# Patient Record
Sex: FEMALE | URBAN - METROPOLITAN AREA
[De-identification: names, ages, dates, MRNs, and addresses within clinical notes are randomized per-mention and may not be internally consistent; named-entity substitution may affect disease eponyms.]

---

## 2022-04-08 ENCOUNTER — APPOINTMENT (OUTPATIENT)
Dept: URBAN - METROPOLITAN AREA SURGERY 9 | Age: 32
Setting detail: DERMATOLOGY
End: 2022-04-08

## 2022-04-08 DIAGNOSIS — D22 MELANOCYTIC NEVI: ICD-10-CM

## 2022-04-08 PROBLEM — D22.5 MELANOCYTIC NEVI OF TRUNK: Status: ACTIVE | Noted: 2022-04-08

## 2022-04-08 PROCEDURE — 99203 OFFICE O/P NEW LOW 30 MIN: CPT

## 2022-04-08 PROCEDURE — OTHER OTHER: OTHER

## 2022-04-08 PROCEDURE — OTHER COUNSELING: OTHER

## 2022-04-08 PROCEDURE — OTHER SUNSCREEN RECOMMENDATIONS: OTHER

## 2022-04-08 ASSESSMENT — LOCATION SIMPLE DESCRIPTION DERM
LOCATION SIMPLE: LOWER BACK
LOCATION SIMPLE: LEFT UPPER BACK
LOCATION SIMPLE: GROIN

## 2022-04-08 ASSESSMENT — LOCATION DETAILED DESCRIPTION DERM
LOCATION DETAILED: INFERIOR LUMBAR SPINE
LOCATION DETAILED: LEFT INFERIOR UPPER BACK
LOCATION DETAILED: LEFT SUPRAPUBIC SKIN

## 2022-04-08 ASSESSMENT — LOCATION ZONE DERM: LOCATION ZONE: TRUNK

## 2022-04-08 NOTE — PROCEDURE: OTHER
Render Risk Assessment In Note?: no
Note Text (......Xxx Chief Complaint.): This diagnosis correlates with the
Detail Level: Simple
Other (Free Text): Bx shows benign nevus extending to margins - path to be scanned into her chart.\\nExam shows an even brown reticulated macule recurring within the scar.  \\nIt sounds like this was removed more from a location and irritation standpoint.\\nCall if pigmentation grows outside of the scar.
Other (Free Text): Reviewed that this could be removed, but it would leave a scar and would alter her tattoo.

## 2022-04-08 NOTE — HPI: SKIN LESION
Is This A New Presentation, Or A Follow-Up?: Skin Lesion
Additional History: Biopsy done at PCP, results: compound melanocytic [place], extending to the biopsy margins. PCP wanted her to follow up here for this.
Is This A New Presentation, Or A Follow-Up?: Skin Lesions

## 2023-12-11 ENCOUNTER — TELEPHONE (OUTPATIENT)
Dept: PRIMARY CARE | Facility: CLINIC | Age: 33
End: 2023-12-11

## 2023-12-11 NOTE — TELEPHONE ENCOUNTER
PATIENT CALLED IS 4 WKS PREG.   WANTS TO KNOW IF SHE SHOULD STOP O,OMEPRAZOLE ?  OR CHANGE  MEDICINE ?

## 2024-01-31 NOTE — PROGRESS NOTES
"Subjective   Patient ID: Shaila Hurtado is a 34 y.o. female who presents for Annual Exam (Wellness ).    Shaila comes to the office for medication refill.  GERD - PPI daily;  Discussed decreasing fat intake, weight loss,  avoiding recumbency for 3 hours postprandially, elevation of HOB and avoidance of chocolate, tomato based foods, alcohol, peppermint, caffeinated products, citrus fruits/drinks and onions. Tried stopping PPI once discovered she was pregnant but GERD sx returned. Continue PPI therapy (she indicates she ck'd w/ her OB as well & they are in agreement to continue)  Depression - managed by Rosa Hendricks, stable on zoloft  Cervical CA Screening: PAP 1Y ago. UTD thru her OB GYN in [place]. 12 W pregnant  Labs 1Y ago- scheduled for labs thru ob/gyn  No surgeries or hospitalizations within the last year  Fell on ice and then two weeks later tripped in basement              Review of Systems   Constitutional:  Positive for fatigue. Negative for chills and fever.   Gastrointestinal:  Negative for abdominal pain.        + GERD       Objective   /76   Pulse 96   Ht 1.651 m (5' 5\")   Wt 100 kg (221 lb)   SpO2 97%   BMI 36.78 kg/m²     Physical Exam  Vitals and nursing note reviewed.   Constitutional:       Appearance: Normal appearance.   HENT:      Head: Normocephalic.   Cardiovascular:      Rate and Rhythm: Normal rate and regular rhythm.      Heart sounds: Normal heart sounds.   Pulmonary:      Effort: Pulmonary effort is normal.      Breath sounds: Normal breath sounds.   Skin:     General: Skin is warm and dry.   Neurological:      General: No focal deficit present.      Mental Status: She is alert and oriented to person, place, and time.   Psychiatric:         Mood and Affect: Mood normal.         Thought Content: Thought content normal.         Assessment/Plan   Problem List Items Addressed This Visit             ICD-10-CM    Depressive disorder - Primary F32.A    Gastroesophageal reflux disease K21.9 "    Relevant Medications    omeprazole (PriLOSEC) 40 mg DR capsule    Other Relevant Orders    Follow Up In Primary Care - Established     1. Depressive disorder      stable on zoloft; follows w/ [facility]      2. Gastroesophageal reflux disease, unspecified whether esophagitis present  omeprazole (PriLOSEC) 40 mg DR capsule    Follow Up In Primary Care - Established    cont. prilosec             Patient was identified as a fall risk. Risk prevention instructions provided.

## 2024-02-01 ENCOUNTER — OFFICE VISIT (OUTPATIENT)
Dept: PRIMARY CARE | Facility: CLINIC | Age: 34
End: 2024-02-01
Payer: COMMERCIAL

## 2024-02-01 VITALS
BODY MASS INDEX: 36.82 KG/M2 | DIASTOLIC BLOOD PRESSURE: 76 MMHG | OXYGEN SATURATION: 97 % | HEIGHT: 65 IN | SYSTOLIC BLOOD PRESSURE: 124 MMHG | HEART RATE: 96 BPM | WEIGHT: 221 LBS

## 2024-02-01 DIAGNOSIS — K21.9 GASTROESOPHAGEAL REFLUX DISEASE, UNSPECIFIED WHETHER ESOPHAGITIS PRESENT: ICD-10-CM

## 2024-02-01 DIAGNOSIS — F32.A DEPRESSIVE DISORDER: Primary | ICD-10-CM

## 2024-02-01 PROBLEM — L23.7 PLANT ALLERGIC CONTACT DERMATITIS: Status: ACTIVE | Noted: 2024-02-01

## 2024-02-01 PROCEDURE — 1036F TOBACCO NON-USER: CPT | Performed by: NURSE PRACTITIONER

## 2024-02-01 PROCEDURE — 99395 PREV VISIT EST AGE 18-39: CPT | Performed by: NURSE PRACTITIONER

## 2024-02-01 RX ORDER — OMEPRAZOLE 40 MG/1
40 CAPSULE, DELAYED RELEASE ORAL
COMMUNITY
End: 2024-02-01 | Stop reason: SDUPTHER

## 2024-02-01 RX ORDER — SERTRALINE HYDROCHLORIDE 50 MG/1
50 TABLET, FILM COATED ORAL DAILY
COMMUNITY
Start: 2023-12-11

## 2024-02-01 RX ORDER — OMEPRAZOLE 40 MG/1
40 CAPSULE, DELAYED RELEASE ORAL
Qty: 90 CAPSULE | Refills: 3 | Status: SHIPPED | OUTPATIENT
Start: 2024-02-01 | End: 2025-01-31

## 2024-02-01 ASSESSMENT — PATIENT HEALTH QUESTIONNAIRE - PHQ9
1. LITTLE INTEREST OR PLEASURE IN DOING THINGS: NOT AT ALL
2. FEELING DOWN, DEPRESSED OR HOPELESS: SEVERAL DAYS
SUM OF ALL RESPONSES TO PHQ9 QUESTIONS 1 AND 2: 1
10. IF YOU CHECKED OFF ANY PROBLEMS, HOW DIFFICULT HAVE THESE PROBLEMS MADE IT FOR YOU TO DO YOUR WORK, TAKE CARE OF THINGS AT HOME, OR GET ALONG WITH OTHER PEOPLE: NOT DIFFICULT AT ALL

## 2024-02-01 ASSESSMENT — ENCOUNTER SYMPTOMS
ABDOMINAL PAIN: 0
FATIGUE: 1
CHILLS: 0
FEVER: 0
ROS GI COMMENTS: + GERD

## 2024-02-01 NOTE — PATIENT INSTRUCTIONS
Refill [facility]  Office visit 1 year      Ways to Help Prevent Falls at Home    Quick Tips   ? Ask for help if you need it. Most people want to help!   ? Get up slowly after sitting or laying down   ? Wear a medical alert device or keep cell phone in your pocket   ? Use night lights, especially areas near a bathroom   ? Keep the items you use often within reach on a small stool or end table   ? Use an assistive device such as walker or cane, as directed by provider/physical therapy   ? Use a non-slip mat and grab bars in your bathroom. Look for home health sections for best options     Other Areas to Focus On   ? Exercise and nutrition: Regular exercise or taking a falls prevention class are great ways improve strength and balance. Don’t forget to stay hydrated and bring a snack!   ? Medicine side effects: Some medicines can make you sleepy or dizzy, which could cause a fall. Ask your healthcare provider about the side effects your medicines could cause. Be sure to let them know if you take any vitamins or supplements as well.   ? Tripping hazards: Remove items you could trip on, such as loose mats, rugs, cords, and clutter. Wear closed toe shoes with rubber soles.   ? Health and wellness: Get regular checkups with your healthcare provider, plus routine vision and hearing screenings. Talk with your healthcare provider about:   o Your medicines and the possible side effects - bring them in a bag if that is easier!   o Problems with balance or feeling dizzy   o Ways to promote bone health, such as Vitamin D and calcium supplements   o Questions or concerns about falling     *Ask your healthcare team if you have questions     [facility], 2022

## 2024-07-29 ENCOUNTER — HOSPITAL ENCOUNTER (INPATIENT)
Age: 34
LOS: 3 days | Discharge: HOME | End: 2024-08-01
Payer: COMMERCIAL

## 2024-07-29 VITALS — TEMPERATURE: 97.7 F | RESPIRATION RATE: 16 BRPM | OXYGEN SATURATION: 98 % | HEART RATE: 79 BPM

## 2024-07-29 VITALS — SYSTOLIC BLOOD PRESSURE: 151 MMHG | DIASTOLIC BLOOD PRESSURE: 80 MMHG | RESPIRATION RATE: 16 BRPM | HEART RATE: 78 BPM

## 2024-07-29 VITALS — DIASTOLIC BLOOD PRESSURE: 63 MMHG | RESPIRATION RATE: 16 BRPM | HEART RATE: 78 BPM | SYSTOLIC BLOOD PRESSURE: 131 MMHG

## 2024-07-29 VITALS — HEART RATE: 85 BPM | SYSTOLIC BLOOD PRESSURE: 162 MMHG | DIASTOLIC BLOOD PRESSURE: 91 MMHG

## 2024-07-29 VITALS — RESPIRATION RATE: 16 BRPM | DIASTOLIC BLOOD PRESSURE: 77 MMHG | SYSTOLIC BLOOD PRESSURE: 128 MMHG | HEART RATE: 83 BPM

## 2024-07-29 VITALS
HEART RATE: 82 BPM | RESPIRATION RATE: 16 BRPM | TEMPERATURE: 98.42 F | SYSTOLIC BLOOD PRESSURE: 127 MMHG | DIASTOLIC BLOOD PRESSURE: 73 MMHG

## 2024-07-29 VITALS — SYSTOLIC BLOOD PRESSURE: 123 MMHG | HEART RATE: 71 BPM | DIASTOLIC BLOOD PRESSURE: 85 MMHG

## 2024-07-29 VITALS — HEART RATE: 80 BPM | OXYGEN SATURATION: 97 %

## 2024-07-29 VITALS — BODY MASS INDEX: 40.4 KG/M2

## 2024-07-29 DIAGNOSIS — F32.A: ICD-10-CM

## 2024-07-29 DIAGNOSIS — O40.3XX0: ICD-10-CM

## 2024-07-29 DIAGNOSIS — O26.03: ICD-10-CM

## 2024-07-29 DIAGNOSIS — Z3A.37: ICD-10-CM

## 2024-07-29 DIAGNOSIS — Z79.899: ICD-10-CM

## 2024-07-29 LAB
DEPRECATED RDW RBC: 53.2 FL (ref 35.1–43.9)
ERYTHROCYTE [DISTWIDTH] IN BLOOD: 15.8 % (ref 11.6–14.6)
HCT VFR BLD AUTO: 31.6 % (ref 37–47)
HEMOGLOBIN: 10.6 G/DL (ref 12–15)
HGB BLD-MCNC: 10.6 G/DL (ref 12–15)
IMMATURE GRANULOCYTES COUNT: 0.09 X10^3/UL (ref 0–0)
MCV RBC: 92.4 FL (ref 81–99)
MEAN CORP HGB CONC: 33.5 G/DL (ref 32–36)
MEAN PLATELET VOL.: 10.6 FL (ref 6.2–12)
NRBC FLAGGED BY ANALYZER: 0 % (ref 0–5)
PLATELET # BLD: 278 K/MM3 (ref 150–450)
PLATELET COUNT: 278 K/MM3 (ref 150–450)
RBC # BLD AUTO: 3.42 M/MM3 (ref 4.2–5.4)
RBC DISTRIBUTION WIDTH CV: 15.8 % (ref 11.6–14.6)
RBC DISTRIBUTION WIDTH SD: 53.2 FL (ref 35.1–43.9)
WBC # BLD AUTO: 11.7 K/MM3 (ref 4.4–11)
WHITE BLOOD COUNT: 11.7 K/MM3 (ref 4.4–11)

## 2024-07-29 PROCEDURE — 86850 RBC ANTIBODY SCREEN: CPT

## 2024-07-29 PROCEDURE — 82962 GLUCOSE BLOOD TEST: CPT

## 2024-07-29 PROCEDURE — 99221 1ST HOSP IP/OBS SF/LOW 40: CPT

## 2024-07-29 PROCEDURE — 86780 TREPONEMA PALLIDUM: CPT

## 2024-07-29 PROCEDURE — 59025 FETAL NON-STRESS TEST: CPT

## 2024-07-29 PROCEDURE — G0378 HOSPITAL OBSERVATION PER HR: HCPCS

## 2024-07-29 PROCEDURE — 86900 BLOOD TYPING SEROLOGIC ABO: CPT

## 2024-07-29 PROCEDURE — 85025 COMPLETE CBC W/AUTO DIFF WBC: CPT

## 2024-07-29 PROCEDURE — 86901 BLOOD TYPING SEROLOGIC RH(D): CPT

## 2024-07-29 PROCEDURE — 59050 FETAL MONITOR W/REPORT: CPT

## 2024-07-29 NOTE — HP.PCM.OB_ITS
HPI - General    
General    
Date of Admission: 07/29/24    
Date of Service: 07/29/24    
Chief Complaint: induction of labor    
HPI Narrative    
rickie GELLER a 34 YOF G 1 P0 W 37 2/7 weeks gestation w/ EDC 8/17/24 presents   
for induction of labor at [facility] recommendation due to GDMA2 w/ polyhydramnios.    
    
Pregnancy complicated to date by maternal obesity, GDMA2, anemia, depression,   
h/o abnormal paps    
NKDA    
medications- prilosec, NPH insulin, abilify and zooft    
Social history- she denies any tob/drug/etoh use during the pregnancy    
surgery history - tonsillectomy    
    
    
Maternal Data    
Information    
Final LAURYN: 08/17/24    
Final LAURYN Source: US <20 weeks    
Gestational age: 37 2/7 weeks    
    
[facility]    
                                Home Medications    
    
    
    
?Medication ?Instructions ?Recorded ?Last Taken ?Type    
     
fexofenadine-pseudoephedrine ER 1 tab.sr PO DAILY 11/05/13 Unknown History    
    
180 mg-240 mg tablet,ext.release        
    
24 hr (Allegra-D 24 Hour)        
     
norethindrone 1 mg-e. estradiol 20 1 ea PO DAILY 01/18/16 Unknown History    
    
mcg (24)-iron 75 mg (4) chew        
    
tablet (Minastrin 24 Fe)        
    
    
    
                                            
    
    
    
Allergy/AdvReac Type Severity Reaction Status Date / Time    
     
No Known Allergies Allergy   Verified 11/05/13 08:50    
    
    
    
Social History    
Smoking Status:  Never smoker     
    
    
    
ROS    
Constitutional    
Constitutional: Denies fatigue, fever(s) or malaise    
Eyes    
Eyes: Denies change in vision    
ENT    
HEENT: Denies dizziness or headache(s)    
Cardiovascular    
Cardiovascular: Denies chest pain, dyspnea or lightheadedness    
Respiratory/Chest    
Respiratory/Chest: Denies cough or dyspnea    
Gastrointestinal    
Gastrointestinal: Denies change in bowel habits    
Genitourinary    
Genitourinary: Denies burning urination or genital lesions    
Integumentary    
Integumentary: Denies rash    
Neurologic    
Neurologic: Denies confusion, dizziness, headache(s), numbness or weakness    
    
Vital Signs    
Vital Signs    
Vital Signs:     
                                            
    
    
    
 07/29/24    
19:39 07/29/24    
19:39 07/29/24    
19:40    
     
Temperature       
     
Temperature Source       
     
Pulse Rate  71 79    
     
Respiratory Rate       
     
Blood Pressure 123/85 H      
     
BP Systolic 123      
     
BP Diastolic 85      
     
Pulse Ox       
    
    
    
    
 07/29/24    
19:40 07/29/24    
19:40 07/29/24    
19:40    
     
Temperature       
     
Temperature Source  Temporal     
     
Pulse Rate       
     
Respiratory Rate   16    
     
Blood Pressure       
     
BP Systolic       
     
BP Diastolic       
     
Pulse Ox 98      
    
    
    
    
 07/29/24    
19:40    
     
Temperature 97.7 F L    
     
Temperature Source     
     
Pulse Rate     
     
Respiratory Rate     
     
Blood Pressure     
     
BP Systolic     
     
BP Diastolic     
     
Pulse Ox     
    
    
                                     Weight    
    
    
    
Weight:                        113.8 kg                                           
    
     
Body Mass Index (BMI)          40.4                                               
    
    
    
    
    
    
Physical Exam    
Const    
alert and no apparent distress    
General Appearance: cooperative    
HEENT    
normocephalic    
Resp    
normal respiratory effort    
Cardio    
regular rate    
GI    
soft to palpation    
GI Narrative:     
gravid, nontender, appropriate for gestational age    
Extremity    
no calf tenderness    
General Extremity: edema    
Skin    
no wounds    
Rashes: No rashes noted    
Psych    
activity/motor behavior normal    
    
Prenatal Labs    
Prenatal Labs    
Prenatal Labs:     
    
    
                          Antibody Screen           Pending     
     
                          Hct                       31.6 % (37-47)  L    
     
                          Hgb                       10.6 g/dL (12.0-15.0)  L    
     
                          Syphilis Total Ab         Pending     
     
                          VZV IgG Antibody          < 0.91 index (.-) L    
    
    
    
Assessment & Plan    
(1) 37 weeks gestation of pregnancy:     
PLAN: Risk benefits and alternatives to induction of labor, discussed with   
patient, her questions were answered to her satisfaction she desires to proceed.  
 Will proceed with Woods and nasal Prestel induction of labor followed by   
Pitocin artificial rupture membranes if needed.  Gestational diabetes class A2   
will give 8 units of insulin tonight and monitor blood sugars appropriately.    
Estimated fetal weight is approximately 4000 g and pelvis clinically adequate to  
expect vaginal delivery.    
(2) High-risk pregnancy in third trimester:     
(3) Gestational diabetes mellitus, class A2:     
(4) Maternal obesity syndrome in third trimester:

## 2024-07-29 NOTE — PCM.HP.OB
HPI - General
General
Date of Admission: 07/29/24
Date of Service: 07/29/24
Chief Complaint: induction of labor
HPI Narrative
rickie GELLER a 34 YOF G 1 P0 W 37 2/7 weeks gestation w/ EDC 8/17/24 presents for induction of labor at [facility] recommendation due to GDMA2 w/ polyhydramnios.

Pregnancy complicated to date by maternal obesity, GDMA2, anemia, depression, h/o abnormal paps
NKDA
medications- prilosec, NPH insulin, abilify and zooft
Social history- she denies any tob/drug/etoh use during the pregnancy
surgery history - tonsillectomy


Maternal Data
Information
Final LAURYN: 08/17/24
Final LAURYN Source: US <20 weeks
Gestational age: 37 2/7 weeks

[facility]
Home Medications

?Medication ?Instructions ?Recorded ?Last Taken ?Type
fexofenadine-pseudoephedrine ER 1 tab.sr PO DAILY 11/05/13 Unknown History
180 mg-240 mg tablet,ext.release    
24 hr (Allegra-D 24 Hour)    
norethindrone 1 mg-e. estradiol 20 1 ea PO DAILY 01/18/16 Unknown History
mcg (24)-iron 75 mg (4) chew    
tablet (Minastrin 24 Fe)    




Allergy/AdvReac Type Severity Reaction Status Date / Time
No Known Allergies Allergy   Verified 11/05/13 08:50


Social History
Smoking Status:  Never smoker 



ROS
Constitutional
Constitutional: Denies fatigue, fever(s) or malaise
Eyes
Eyes: Denies change in vision
ENT
HEENT: Denies dizziness or headache(s)
Cardiovascular
Cardiovascular: Denies chest pain, dyspnea or lightheadedness
Respiratory/Chest
Respiratory/Chest: Denies cough or dyspnea
Gastrointestinal
Gastrointestinal: Denies change in bowel habits
Genitourinary
Genitourinary: Denies burning urination or genital lesions
Integumentary
Integumentary: Denies rash
Neurologic
Neurologic: Denies confusion, dizziness, headache(s), numbness or weakness

Vital Signs
Vital Signs
Vital Signs: 


 07/29/24
19:39 07/29/24
19:39 07/29/24
19:40
Temperature   
Temperature Source   
Pulse Rate  71 79
Respiratory Rate   
Blood Pressure 123/85 H  
BP Systolic 123  
BP Diastolic 85  
Pulse Ox   

 07/29/24
19:40 07/29/24
19:40 07/29/24
19:40
Temperature   
Temperature Source  Temporal 
Pulse Rate   
Respiratory Rate   16
Blood Pressure   
BP Systolic   
BP Diastolic   
Pulse Ox 98  

 07/29/24
19:40
Temperature 97.7 F L
Temperature Source 
Pulse Rate 
Respiratory Rate 
Blood Pressure 
BP Systolic 
BP Diastolic 
Pulse Ox 

Weight

Weight:                        113.8 kg                                          
Body Mass Index (BMI)          40.4                                              




Physical Exam
Const
alert and no apparent distress
General Appearance: cooperative
HEENT
normocephalic
Resp
normal respiratory effort
Cardio
regular rate
GI
soft to palpation
GI Narrative: 
gravid, nontender, appropriate for gestational age
Extremity
no calf tenderness
General Extremity: edema
Skin
no wounds
Rashes: No rashes noted
Psych
activity/motor behavior normal

Prenatal Labs
Prenatal Labs
Prenatal Labs: 
      Antibody Screen Pending 
      Hct 31.6 % (37-47)  L
      Hgb 10.6 g/dL (12.0-15.0)  L
      Syphilis Total Ab Pending 
      VZV IgG Antibody < 0.91 index (.-) L


Assessment & Plan
(1) 37 weeks gestation of pregnancy: 
PLAN: Risk benefits and alternatives to induction of labor, discussed with patient, her questions were answered to her satisfaction she desires to proceed.  Will proceed with Woods and nasal Prestel induction of labor followed by Pitocin artificial 
rupture membranes if needed.  Gestational diabetes class A2 will give 8 units of insulin tonight and monitor blood sugars appropriately.  Estimated fetal weight is approximately 4000 g and pelvis clinically adequate to expect vaginal delivery.
(2) High-risk pregnancy in third trimester: 
(3) Gestational diabetes mellitus, class A2: 
(4) Maternal obesity syndrome in third trimester:

## 2024-07-30 VITALS — DIASTOLIC BLOOD PRESSURE: 74 MMHG | SYSTOLIC BLOOD PRESSURE: 135 MMHG | HEART RATE: 88 BPM

## 2024-07-30 VITALS — HEART RATE: 82 BPM | OXYGEN SATURATION: 94 %

## 2024-07-30 VITALS — RESPIRATION RATE: 16 BRPM | SYSTOLIC BLOOD PRESSURE: 125 MMHG | HEART RATE: 64 BPM | DIASTOLIC BLOOD PRESSURE: 74 MMHG

## 2024-07-30 VITALS — DIASTOLIC BLOOD PRESSURE: 71 MMHG | SYSTOLIC BLOOD PRESSURE: 130 MMHG | HEART RATE: 70 BPM

## 2024-07-30 VITALS — DIASTOLIC BLOOD PRESSURE: 75 MMHG | RESPIRATION RATE: 16 BRPM | HEART RATE: 72 BPM | SYSTOLIC BLOOD PRESSURE: 131 MMHG

## 2024-07-30 VITALS
DIASTOLIC BLOOD PRESSURE: 84 MMHG | HEART RATE: 75 BPM | SYSTOLIC BLOOD PRESSURE: 140 MMHG | RESPIRATION RATE: 16 BRPM | TEMPERATURE: 97.52 F | OXYGEN SATURATION: 96 %

## 2024-07-30 VITALS — HEART RATE: 95 BPM | DIASTOLIC BLOOD PRESSURE: 61 MMHG | SYSTOLIC BLOOD PRESSURE: 118 MMHG

## 2024-07-30 VITALS — SYSTOLIC BLOOD PRESSURE: 135 MMHG | DIASTOLIC BLOOD PRESSURE: 75 MMHG | HEART RATE: 79 BPM

## 2024-07-30 VITALS — HEART RATE: 91 BPM | OXYGEN SATURATION: 96 %

## 2024-07-30 VITALS
SYSTOLIC BLOOD PRESSURE: 136 MMHG | HEART RATE: 88 BPM | RESPIRATION RATE: 16 BRPM | TEMPERATURE: 97.16 F | DIASTOLIC BLOOD PRESSURE: 84 MMHG

## 2024-07-30 VITALS — HEART RATE: 82 BPM | OXYGEN SATURATION: 97 %

## 2024-07-30 VITALS — DIASTOLIC BLOOD PRESSURE: 75 MMHG | SYSTOLIC BLOOD PRESSURE: 118 MMHG | HEART RATE: 72 BPM

## 2024-07-30 VITALS — OXYGEN SATURATION: 94 % | HEART RATE: 96 BPM | SYSTOLIC BLOOD PRESSURE: 108 MMHG | DIASTOLIC BLOOD PRESSURE: 56 MMHG

## 2024-07-30 VITALS — HEART RATE: 83 BPM | DIASTOLIC BLOOD PRESSURE: 54 MMHG | SYSTOLIC BLOOD PRESSURE: 112 MMHG

## 2024-07-30 VITALS — OXYGEN SATURATION: 96 % | HEART RATE: 93 BPM

## 2024-07-30 VITALS — OXYGEN SATURATION: 96 % | HEART RATE: 98 BPM

## 2024-07-30 VITALS — RESPIRATION RATE: 16 BRPM | TEMPERATURE: 97.88 F

## 2024-07-30 VITALS — RESPIRATION RATE: 16 BRPM

## 2024-07-30 VITALS — OXYGEN SATURATION: 96 % | HEART RATE: 89 BPM

## 2024-07-30 VITALS — OXYGEN SATURATION: 94 % | HEART RATE: 77 BPM

## 2024-07-30 VITALS — OXYGEN SATURATION: 94 % | HEART RATE: 87 BPM

## 2024-07-30 VITALS — RESPIRATION RATE: 16 BRPM | TEMPERATURE: 98.2 F

## 2024-07-30 VITALS
HEART RATE: 75 BPM | DIASTOLIC BLOOD PRESSURE: 62 MMHG | SYSTOLIC BLOOD PRESSURE: 121 MMHG | TEMPERATURE: 98.42 F | RESPIRATION RATE: 16 BRPM

## 2024-07-30 VITALS — RESPIRATION RATE: 16 BRPM | HEART RATE: 75 BPM | DIASTOLIC BLOOD PRESSURE: 76 MMHG | SYSTOLIC BLOOD PRESSURE: 134 MMHG

## 2024-07-30 VITALS — HEART RATE: 108 BPM | OXYGEN SATURATION: 97 %

## 2024-07-30 VITALS
TEMPERATURE: 98 F | DIASTOLIC BLOOD PRESSURE: 82 MMHG | RESPIRATION RATE: 16 BRPM | SYSTOLIC BLOOD PRESSURE: 138 MMHG | HEART RATE: 79 BPM

## 2024-07-30 VITALS — OXYGEN SATURATION: 97 % | HEART RATE: 113 BPM

## 2024-07-30 VITALS — HEART RATE: 116 BPM | OXYGEN SATURATION: 97 %

## 2024-07-30 VITALS — DIASTOLIC BLOOD PRESSURE: 58 MMHG | HEART RATE: 87 BPM | SYSTOLIC BLOOD PRESSURE: 117 MMHG

## 2024-07-30 VITALS — HEART RATE: 97 BPM | DIASTOLIC BLOOD PRESSURE: 59 MMHG | SYSTOLIC BLOOD PRESSURE: 118 MMHG

## 2024-07-30 VITALS — TEMPERATURE: 97.4 F | RESPIRATION RATE: 16 BRPM

## 2024-07-30 VITALS — DIASTOLIC BLOOD PRESSURE: 50 MMHG | SYSTOLIC BLOOD PRESSURE: 106 MMHG | HEART RATE: 87 BPM

## 2024-07-30 VITALS — OXYGEN SATURATION: 96 % | HEART RATE: 88 BPM

## 2024-07-30 VITALS
HEART RATE: 89 BPM | TEMPERATURE: 97.4 F | SYSTOLIC BLOOD PRESSURE: 108 MMHG | DIASTOLIC BLOOD PRESSURE: 59 MMHG | OXYGEN SATURATION: 96 % | RESPIRATION RATE: 16 BRPM

## 2024-07-30 VITALS — HEART RATE: 110 BPM | DIASTOLIC BLOOD PRESSURE: 69 MMHG | SYSTOLIC BLOOD PRESSURE: 119 MMHG

## 2024-07-30 VITALS
HEART RATE: 89 BPM | SYSTOLIC BLOOD PRESSURE: 145 MMHG | RESPIRATION RATE: 16 BRPM | OXYGEN SATURATION: 99 % | DIASTOLIC BLOOD PRESSURE: 88 MMHG

## 2024-07-30 VITALS — SYSTOLIC BLOOD PRESSURE: 126 MMHG | HEART RATE: 80 BPM | DIASTOLIC BLOOD PRESSURE: 76 MMHG

## 2024-07-30 VITALS — OXYGEN SATURATION: 96 % | HEART RATE: 109 BPM

## 2024-07-30 VITALS — DIASTOLIC BLOOD PRESSURE: 77 MMHG | RESPIRATION RATE: 16 BRPM | HEART RATE: 81 BPM | SYSTOLIC BLOOD PRESSURE: 139 MMHG

## 2024-07-30 VITALS — HEART RATE: 84 BPM | OXYGEN SATURATION: 91 %

## 2024-07-30 VITALS — RESPIRATION RATE: 16 BRPM | HEART RATE: 122 BPM | OXYGEN SATURATION: 89 %

## 2024-07-30 VITALS — SYSTOLIC BLOOD PRESSURE: 147 MMHG | HEART RATE: 93 BPM | DIASTOLIC BLOOD PRESSURE: 93 MMHG

## 2024-07-30 VITALS — HEART RATE: 88 BPM | DIASTOLIC BLOOD PRESSURE: 63 MMHG | SYSTOLIC BLOOD PRESSURE: 126 MMHG

## 2024-07-30 VITALS — SYSTOLIC BLOOD PRESSURE: 119 MMHG | HEART RATE: 100 BPM | DIASTOLIC BLOOD PRESSURE: 67 MMHG

## 2024-07-30 VITALS — OXYGEN SATURATION: 97 % | HEART RATE: 86 BPM

## 2024-07-30 VITALS — OXYGEN SATURATION: 96 % | HEART RATE: 90 BPM

## 2024-07-30 VITALS — HEART RATE: 111 BPM | OXYGEN SATURATION: 97 %

## 2024-07-30 VITALS — OXYGEN SATURATION: 97 % | HEART RATE: 87 BPM

## 2024-07-30 VITALS
SYSTOLIC BLOOD PRESSURE: 118 MMHG | TEMPERATURE: 97.6 F | RESPIRATION RATE: 16 BRPM | HEART RATE: 82 BPM | DIASTOLIC BLOOD PRESSURE: 66 MMHG

## 2024-07-30 VITALS — DIASTOLIC BLOOD PRESSURE: 59 MMHG | HEART RATE: 76 BPM | SYSTOLIC BLOOD PRESSURE: 114 MMHG

## 2024-07-30 VITALS — HEART RATE: 77 BPM | DIASTOLIC BLOOD PRESSURE: 71 MMHG | SYSTOLIC BLOOD PRESSURE: 129 MMHG

## 2024-07-30 VITALS — HEART RATE: 102 BPM | OXYGEN SATURATION: 96 %

## 2024-07-30 VITALS — HEART RATE: 89 BPM | OXYGEN SATURATION: 97 %

## 2024-07-30 VITALS — OXYGEN SATURATION: 97 % | HEART RATE: 89 BPM

## 2024-07-30 VITALS — HEART RATE: 90 BPM | OXYGEN SATURATION: 97 %

## 2024-07-30 VITALS — HEART RATE: 101 BPM | OXYGEN SATURATION: 97 %

## 2024-07-30 VITALS — OXYGEN SATURATION: 96 % | HEART RATE: 85 BPM

## 2024-07-30 VITALS — HEART RATE: 99 BPM | OXYGEN SATURATION: 97 %

## 2024-07-30 VITALS — DIASTOLIC BLOOD PRESSURE: 92 MMHG | SYSTOLIC BLOOD PRESSURE: 138 MMHG | HEART RATE: 82 BPM | OXYGEN SATURATION: 98 %

## 2024-07-30 VITALS — OXYGEN SATURATION: 93 % | HEART RATE: 88 BPM

## 2024-07-30 VITALS — RESPIRATION RATE: 16 BRPM | TEMPERATURE: 98 F

## 2024-07-30 VITALS — OXYGEN SATURATION: 95 % | HEART RATE: 88 BPM

## 2024-07-30 VITALS — HEART RATE: 81 BPM | OXYGEN SATURATION: 98 %

## 2024-07-30 VITALS — HEART RATE: 93 BPM | OXYGEN SATURATION: 96 %

## 2024-07-30 VITALS — HEART RATE: 86 BPM | SYSTOLIC BLOOD PRESSURE: 147 MMHG | DIASTOLIC BLOOD PRESSURE: 88 MMHG

## 2024-07-30 VITALS — OXYGEN SATURATION: 99 % | HEART RATE: 67 BPM

## 2024-07-30 VITALS — OXYGEN SATURATION: 97 % | HEART RATE: 91 BPM

## 2024-07-30 VITALS — RESPIRATION RATE: 16 BRPM | DIASTOLIC BLOOD PRESSURE: 76 MMHG | SYSTOLIC BLOOD PRESSURE: 139 MMHG | HEART RATE: 72 BPM

## 2024-07-30 VITALS — HEART RATE: 97 BPM | OXYGEN SATURATION: 97 %

## 2024-07-30 VITALS — SYSTOLIC BLOOD PRESSURE: 123 MMHG | DIASTOLIC BLOOD PRESSURE: 73 MMHG | HEART RATE: 74 BPM

## 2024-07-30 VITALS — OXYGEN SATURATION: 99 % | HEART RATE: 76 BPM

## 2024-07-30 VITALS — HEART RATE: 81 BPM | DIASTOLIC BLOOD PRESSURE: 79 MMHG | SYSTOLIC BLOOD PRESSURE: 126 MMHG

## 2024-07-30 VITALS — OXYGEN SATURATION: 85 % | HEART RATE: 72 BPM

## 2024-07-30 VITALS — RESPIRATION RATE: 16 BRPM | TEMPERATURE: 98.06 F

## 2024-07-30 VITALS — HEART RATE: 99 BPM | DIASTOLIC BLOOD PRESSURE: 70 MMHG | SYSTOLIC BLOOD PRESSURE: 121 MMHG

## 2024-07-30 VITALS — HEART RATE: 91 BPM | OXYGEN SATURATION: 93 %

## 2024-07-30 NOTE — OP.PCM_ITS
Vaginal Delivery    
Maternal Presentation    
Maternal Presentation: Medically Indicated Induction    
Type of Induction: Pitocin, Woods Bulb and Amniotomy    
Operative Information    
Date of Procedure: 24    
Pre-Operative Diagnosis: GDMA2, polyhydramnios, Obesity in pregnancy , meconium    
Post-Operative Diagnosis: Same, live female infant    
Surgery / Procedure Performed: Spontaneous Vaginal Delivery    
Type of Anesthesia: Epidural    
Drain: Woods to straight drain    
Estimated Blood Loss: 150    
Time of Delivery: 19:23    
Findings    
Description of Procedure:     
Patient progressed to fully dilated.  Good maternal pushing efforts delivered   
the infant's head.  Loose nuchal cord was appreciated this was reduced prior to   
delivery.  Anterior shoulder delivered without complication followed by the   
posterior shoulder and the rest the infant's body.  The infant was placed on the  
mother's chest and the cord was clamped and cut immediately.  The infant was   
handed to the waiting nursery team and brought to the [facility] for resuscitation  
measures and further evaluation.  At this time IV Pitocin was started.  Cord   
gases and cord blood were obtained.  First-degree vaginal laceration was   
appreciated with some brisk bleeding.  3-0 Rapide suture in a figure-of-eight   
fashion were placed in multiple areas.  Laceration was minimal however the   
bleeding was brisk for how small the laceration was.  Pressure was held for 2   
minutes.  Good hemostasis was then appreciated.    
Fetal Presentation: Vertex    
Amniotic Membrane Rupture Type: Artificial    
Amniotic Fluid Description: Moderate meconium    
Placental Delivery Description: Expressed    
Placenta Disposition: [facility]    
Specimen(s) Removed: Placenta    
Fetal Cord Vessel Description: 3 Vessels    
Cord Entanglement: Around neck x 1, loose    
Nuchal Cord Compression: Without compression    
Cord Gases: ABG and VBG    
Infant A Gender: Female    
Apgar (1 minute): 3    
Apgar (5 minute): 9    
Delayed Cord Clamping: No    
Post Vaginal Delivery    
Medications Given After Delivery: IV Pitocin    
Episiotomy Description: None    
Laceration: Vaginal Extension/lac and 1st degree    
Complication    
Complications: None

## 2024-07-30 NOTE — PN_ITS
Progress Note    
pt seen at bedside, AROM performed- Meconium noted. Continue Pitocin. FHR   
reviewed, cat 1.

## 2024-07-30 NOTE — XMS RPT_ITS
Comprehensive CCD (C-CDA v2.1)  
  
                            Created on: 2024  
  
  
May, Ms. Collins  
External Reference #: CDR,PersonID:[id number]  
: 1990  
Sex: Female  
  
Demographics  
  
  
                                        Address             [address]  
[place]  [address]  
   
                                        Home Phone          [phone]  
   
                                        Mobile Phone        [phone]  
   
                                        Preferred Language  en  
   
                                        Marital Status      Single  
   
                                        Jew Affiliation Unknown  
   
                                        Race                White  
   
                                        Ethnic Group        Not  or Lati  
no  
  
  
Author  
  
  
                                        Organization        [facility]  
  
  
Care Team Providers  
  
  
                                Care Team Member Name Role            Phone  
   
                                Furness, Tim T Unavailable     Unavailable  
   
                                Furness, Tim T Unavailable     Unavailable  
   
                                Furness, Tim T Unavailable     Unavailable  
   
                                Furness, Tim T Unavailable     Unavailable  
   
                                Furness, Tim T Unavailable     Unavailable  
   
                                Furness, Tim T Unavailable     Unavailable  
   
                                Furness, Tim T Unavailable     Unavailable  
   
                                Furness, Tim Primary Care Provider [phone]  
   
                                ARCADIO HEARD Attending       Unavailable  
   
                                TIM CARMICHAEL Primary Care    Unavai  
lable  
   
                                FurnTim beauchamp Primary Care Provider   
[phone]  
   
                                Tim Carmichael MD Primary Care Provider [phone]  
   
                                Furness, Itm T Unavailable     [phone]  
   
                                Unavailable     Unavailable     [phone]  
   
                                FurnDebbie beauchamprick SHRUTI Primary Care Provider Unavail  
able  
   
                                Furness, Tim Parrish Primary Care    Linda Angel, MsNandini Nguyen Referring       Unav  
ailable  
   
                                Wood, MsNandini Nguyen Attending       Unav  
ailable  
   
                                Wood, MsNandini Nguyen Attending       Unav  
ailable  
   
                                FurnTim beauchamp Primary Care    Unaskip Angel, MsNandini Farhad Nguyen Referring       Unav  
ailable  
   
                                FurnTim beauchamp MD Primary Care Provider [phone]  
[phone]  
   
                                Tim Carmichael MD Primary Care Provider [phone]  
[phone]  
   
                                Tim Carmichael MD Primary Care Provider [phone]  
[phone]  
   
                                Keely Tony RD Unavailable     [phone]  
   
                                FARHAD ANGEL Attending       Unavailable  
   
                                FURNESSTIM Primary Care    Unavailable  
   
                                FURNESSTIM Primary Care    ERIC Ferrari Attending       Unavail  
able  
   
                                FURNESS, TIM BAHENA Primary Care    Unavailable  
   
                                DILCIA WHITE  Referring       Unavailable  
   
                                FURNESS, TIM T Primary Care    Unavailable  
   
                                WISWELL, SHAYNA   Attending       Unavailable  
   
                                FURNESS, TIM T Primary Care    Unavailable  
   
                                LETTY BEAVER  Attending       Unavailable  
   
                                FURNESS, TIM T Primary Care    Unavailable  
   
                                FURNESS, TIM T Primary Care    Unavailable  
   
                                PLOTJANET TEJEDA Attending       Unavailable  
   
                                FURNESS, TIM T Primary Care    Unavailable  
   
                                WISWELL, SHAYNA   Referring       Unavailable  
   
                                FURNESS, TIM T Primary Care    Unavailable  
   
                                WISWELL, SHAYNA   Referring       Unavailable  
   
                                FURNESS, TIM T Primary Care    Unavailable  
   
                                WISWELL, SHAYNA   Referring       Unavailable  
   
                                FURNESS, TIM T Primary Care    Unavailable  
   
                                FURNESS, TIM T Primary Care    Unavailable  
   
                                NARDA PETERS Attending       Unavailable  
   
                                WISWELLSHAYNA   Referring       Unavailable  
   
                                FURNESS, TIM T Primary Care    Unavailable  
   
                                CINDY, DILCIA  Attending       Unavailable  
   
                                FURNESS, TIM T Primary Care    Unavailable  
   
                                JANET LILLY Attending       Unavailable  
   
                                FURNESS, TIM T Primary Care    Unavailable  
   
                                LETTY BEAVER  Attending       Unavailable  
   
                                FURNESS, TIM T Primary Care    Unavailable  
   
                                JANET LILLY Referring       Unavailable  
   
                                FURNESS, TIM T Primary Care    Unavailable  
   
                                FURNESS, TIM T Primary Care    Unavailable  
   
                                HABOBBI OROURKE    Referring       Unavailable  
   
                                FURNESS, TIM T Primary Care    Unavailable  
   
                                WISWELL, SHAYNA   Referring       Unavailable  
   
                                FURNESS, TIM T Primary Care    Unavailable  
   
                                FURNESS, TIM T Primary Care    Unavailable  
   
                                WISSHAYNA HYATT   Attending       Unavailable  
   
                                FURNESS, TIM T Primary Care    Unavailable  
   
                                BOBBI JALLOH    Attending       Unavailable  
   
                                FURNESS, TIM T Primary Care    Unavailable  
   
                                CINDYDILCIA  Referring       Unavailable  
   
                                CINDY, DILCIA  Attending       Unavailable  
   
                                FURNESS, TIM T Primary Care    Unavailable  
   
                                CINDYDILCIA SEE  Referring       Unavailable  
   
                                FURNESS, TIM T Primary Care    Unavailable  
   
                                WISSHAYNA HYATT   Attending       Unavailable  
   
                                FURNESS, TIM T Primary Care    Unavailable  
   
                                FURNESS, TIM T Primary Care    Unavailable  
   
                                HABOBBI OROURKE    Referring       Unavailable  
   
                                KEELY TONY Attending       Unavailable  
   
                                FURNESS, TIM T Primary Care    Unavailable  
   
                                LETTY BEAVER  Referring       Unavailable  
   
                                FURNESS, TIM T Primary Care    Unavailable  
   
                                FURNESS, TIM T Primary Care    Unavailable  
   
                                JANET LILLY Attending       Unavailable  
   
                                LETTY BEAVER  Attending       Unavailable  
   
                                FURNESS, TIM T Primary Care    Unavailable  
   
                                FURNESS, TIM T Primary Care    Unavailable  
   
                                JANET LILLY Attending       Unavailable  
   
                                FURNESS, TIM T Primary Care    Unavailable  
   
                                NARDA PETERS Attending       Unavailable  
   
                                LETTY BEAVER  Attending       Unavailable  
   
                                WISWELL, SHAYNA   Referring       Unavailable  
   
                                FURNESS, TIM T Primary Care    Unavailable  
   
                                FURNESS, TIM T Primary Care    Unavailable  
   
                                LETTY BEAVER  Referring       Unavailable  
   
                                HABOBBI OROURKE    Referring       Unavailable  
   
                                FURNESS, TIM T Primary Care    Unavailable  
   
                                SHAYNA ESCALERA   Referring       Unavailable  
   
                                FURNESS, TIM BAHENA Primary Care    Unavailable  
   
                                FURNESS, TIM T Primary Care    Unavailable  
   
                                JANET LILLY Attending       Unavailable  
   
                                FURNESS, TIM T Primary Care    Unavailable  
  
  
  
Allergies  
  
  
                                                    Allergy   
Classification                          Reported   
Allergen(s)               Allergy Type              Date of   
Onset                     Reaction(s)               Facility  
   
                                                      
(1 source)                              No Known   
Medication   
Allergies;   
Translations:   
[No Known   
Medication   
Allergies]                              Propensity to   
adverse   
reactions to   
drug (disorder)                                             [facility]   
Repository  
  
  
  
Medications  
Current Medications  
  
  
  
                      Medication Drug Class(es) Dates      Sig (Normalized) Sig   
(Original)  
   
                                                    ARIPiprazole 2 mg   
oral tablet  
(20 sources)                            Atypical   
Antipsychotic                           Start:   
2024                                          ARIPiprazole   
(ABILIFY) 2 mg   
tablet Take 2 mg   
by mouth once   
daily. 1mg for 7   
days then 2mg 0   
2024 Active  
   
                                        Comment on above:   Take 2 mg by mouth o  
nce daily. 1mg for 7 days then 2mg   
   
                                                    aspirin 81 mg oral   
tablet  
(20 sources)                            Platelet Aggregation   
Inhibitor,   
Nonsteroidal   
Anti-inflammatory   
Drug                                                take 81 mg by   
mouth once daily                        BABY ASPIRIN ORAL   
Take 81 mg by   
mouth once daily.   
0 Active  
   
                                        Comment on above:   Take 81 mg by mouth   
once daily.   
   
                                                    Blood-Glucose Meter   
(FREESTYLE FREEDOM)   
monitoring kit  
(20 sources)                                        Start:   
2024  
End:   
2024                                          Blood-Glucose   
Meter (FREESTYLE   
FREEDOM)   
monitoring kit 1   
Each as needed. 1   
Kit 0 2024 Active  
   
                                                    fexofenadine   
hydrochloride 60 mg   
oral tablet  
(9 sources)                             Histamine-1 Receptor   
Antagonist                                                  fexofenadine   
(ALLEGRA ALLERGY)   
60 MG Tab Take by   
mouth. 0 Active  
   
                                                    fluticasone   
propionate 0.05   
mg/actuat metered   
dose nasal spray  
(10 sources)              Corticosteroid            Start:   
2019                                          fluticasone 50   
MCG/ACT   
Suspension nasal   
spray INSTILL 2   
SPRAYS EACH NARES   
QD PRN FOR RELIEF   
OF ALLERGY   
SYMPTOMS . 0   
2019 Active  
  
  
  
                                Start: 2019                 fluticasone pr  
opionate (FLONASE) 50 mcg/actuation nasal spray  
  
Indications: ETD (Eustachian tube dysfunction), bilateral INSTILL   
2 SPRAYS EACH NARES QD PRN FOR RELIEF OF ALLERGY SYMPTOMS . 16 g   
0 2019 Active  
  
  
  
                                                    3 ml insulin isophane, human  
   
100 unt/ml pen injector  
(20 sources)                    Start: 2024                 insulin NPH (N  
OVOLIN N FLEXPEN)   
100 unit/mL (3 mL) injection pen   
Indications: Encounter for   
supervision of high risk pregnancy   
in third trimester, antepartum   
Inject 12 Units subcutaneously   
daily at bedtime. 3 mL 3   
2024 Active  
  
  
  
                                                    Start: 06-  
End: 2024                         inject 8 [IU] by subcutaneous   
injection once daily at bedtime         insulin NPH (NOVOLIN N FLEXPEN)   
100 unit/mL (3 mL) injection pen   
Indications: 30 weeks gestation of   
pregnancy , Diet controlled   
gestational diabetes mellitus   
(GDM) in third trimester ,   
Encounter for supervision of   
normal first pregnancy in third   
trimester Inject 8 Units   
subcutaneously daily at bedtime. 3   
mL 3 06/10/2024 2024   
Discontinued  
  
  
  
                                                    isopropyl alcohol   
0.7 ml/ml   
medicated pad  
(20 sources)                    Start: 2024                 alcohol swabs   
(ALCOHOL PREP PADS)   
Indications:   
Gestational   
diabetes mellitus   
(GDM) in third   
trimester,   
gestational   
diabetes method of   
control unspecified   
Use as directed to   
check glucose   
levels up to seven   
times daily. 200   
Each 8 2024   
Active  
   
                                                    omeprazole 20 mg   
delayed release   
oral capsule  
(20 sources)                            Proton Pump   
Inhibitor                               Start: 2024  
End: 2024                         take 1 capsule   
by mouth twice   
daily                                   omeprazole   
(PRILOSEC) 20 mg   
capsule Take 1   
capsule by mouth   
two times a day. 60   
capsule 2   
2024 Active  
  
  
  
                                        Start: 2023   take 20 mg by mouth   
once   
daily                                   Omeprazole 20 mg TbEC Take 20 mg by   
mouth once daily. 0 2023 Active  
   
                                                    Start: 2022  
End: 2025                         take 1 capsule by mouth once   
daily                                   omeprazole (PriLOSEC) 40 mg DR capsule   
Indications: Gastroesophageal reflux   
disease, unspecified whether   
esophagitis present Take 1 capsule (40   
mg) by mouth once daily. 90 capsule 3   
2024 Active  
   
                                        Start: 2022   take 1 capsule by mo  
uth once   
daily                                   Omeprazole 20 MG Oral Capsule Delayed   
Release TAKE 1 CAPSULE Daily Quantity:   
30 Refills: 11 Ordered: 29-Dec-2022   
Farhad Gusman Start :   
29-Dec-2022 Active  
  
  
  
                                        Comment on above:   Take 20 mg by mouth   
once daily.   
   
                                                            Take 40 mg by mouth   
once daily.   
   
                                                            Take 1 capsule by mo  
uth two times a day.   
   
                                                    prenatal 21/iron   
fu/folic acid   
(PRENATAL COMPLETE   
ORAL)  
(20 sources)                                                    prenatal 21/iron  
   
fu/folic acid   
(PRENATAL COMPLETE   
ORAL) Take by mouth.   
0 Active  
   
                                        Comment on above:   Take by mouth.   
   
                                                    sertraline 100 mg   
oral tablet  
(20 sources)                            Serotonin Reuptake   
Inhibitor                               Start:   
2024                              take 1 tablet by   
mouth once daily                        sertraline (ZOLOFT)   
100 mg tablet Take 1   
tablet by mouth once   
daily. 90 tablet 3   
2024 Active  
  
  
  
                                                    Start: 2023  
End: 2024                                     sertraline (ZOLOFT) 50 mg ta  
blet 75   
mg once daily. 0 2023 Discontinued  
   
                                        Start: 2023   take 1 tablet by ha  
th once   
daily                                   sertraline (Zoloft) 50 mg tablet   
Take 1 tablet (50 mg) by mouth once   
daily. 0 2023 Active  
   
                                                      
End: 2024           take 75 mg by mouth once daily sertraline HCl (ZOLOFT   
ORAL) Take  
 75   
mg by mouth once daily. 0 2024   
Discontinued (Clinical Decision)  
   
                                                take 75 mg by mouth once daily s  
ertraline HCl (ZOLOFT ORAL) Take 75   
mg by mouth once daily. 0 Active  
  
  
  
                                        Comment on above:   Take 75 mg by mouth   
once daily.   
   
                                                            75 mg once daily.   
   
                                                            Take 1 tablet by ha  
th once daily.   
  
  
  
Completed/Discontinued Medications  
  
  
  
                      Medication Drug Class(es) Dates      Sig (Normalized) Sig   
(Original)  
   
                                                    acetaminophen 325 mg /   
oxyCODONE   
hydrochloride 5 mg   
oral tablet  
(8 sources)               Opioid Agonist            Start:   
2020  
End:   
2020                                          oxyCODONE-acetamin  
ophen 5-325 MG Tab   
per tablet  
   
                                                    docosahexaenoic acid   
1000 mg / omega-3 acid   
ethyl esters (usp) 300   
mg delayed release   
oral capsule  
(8 sources)                                           
End:   
2020                                          Omega-3 Fatty   
Acids (FISH OIL)   
1000 MG Cap DR   
Take by mouth. 0   
2020   
Discontinued   
(Therapy   
completed)  
   
                                                    escitalopram 20 mg   
oral tablet  
(7 sources)                             Serotonin   
Reuptake   
Inhibitor                               Start:   
2022                              take 1 tablet by   
mouth once daily                        Escitalopram   
Oxalate 20 MG Oral   
Tablet Take 1   
tablet daily   
Quantity: 30   
Refills: 11   
Ordered:   
29-Dec-2022 Farhad Gusman   
Start :   
29-Dec-2022 Active  
  
  
  
                                        Start: 2022   take 1 tablet by ha  
 once   
daily                                   Escitalopram Oxalate 10 MG Oral   
Tablet Take 1 tablet daily Quantity:   
90 Refills: 3 Ordered: 29-Dec-2022   
Farhad Gusman Start :   
2022 Active  
   
                                                            take 2 tablets by mo  
uth once   
daily                                   escitalopram oxalate (LEXAPRO) 10 mg   
tablet Take 20 mg by mouth once   
daily. 0 Active  
   
                                                            take 1 tablet by ha  
 once   
daily                                   escitalopram 10 MG tablet Take 10 mg   
by mouth daily. 0 Active  
  
  
  
                                        Comment on above:   Take 20 mg by mouth   
once daily.   
   
                                                    famotidine 20 mg oral   
tablet  
(1 source)                              Histamine-2 Receptor   
Antagonist                              Start:   
  
End:   
                                     take 1 tablet   
by mouth   
twice daily                             famotidine (PEPCID)   
20 mg tablet Take 1   
tablet by mouth two   
times a day. 90   
tablet 2 2024   
Discontinued (Course   
of therapy completed)  
   
                                        Comment on above:   Take 1 tablet by [facility] two times a day.   
   
                                                    fexofenadine /   
Pseudoephedrine  
(1 source)                              alpha-Adrenergic   
Agonist, Histamine-1   
Receptor Antagonist                                         FEXOFENADINE/PSEUDOE  
P  
HEDRINE (ALLEGRA-D 24   
HOUR ORAL) Take by   
mouth as needed. 0   
Active  
   
                                        Comment on above:   Take by mouth as nee  
ded.   
   
                                                    folic acid 1 mg oral   
tablet  
(1 source)                                          Start:   
                                     take 1 tablet   
by mouth once   
daily                                   folic acid 1 mg   
tablet Take 1 tablet   
by mouth once daily.   
0 2022 Active  
   
                                        Comment on above:   Take 1 tablet by ha  
 once daily.   
   
                                                    hydrocortisone 0.025   
mg/mg topical ointment  
(1 source)                Corticosteroid            Start:   
                                                 hydrocortisone 2.5 %   
ointment Apply 1   
application to   
affected area twice   
daily. 30 g 3   
2017 Active  
   
                                        Comment on above:   Apply 1 application   
to affected area twice daily.   
   
                                                    iron sucrose iv   
piggyback 200 mg in   
NaCl 0.9% 100 mL   
(VENOFER)  
(3 sources)                                         Start:   
  
End:   
                                                 iron sucrose iv   
piggyback 200 mg in   
NaCl 0.9% 100 mL   
(VENOFER)  
  
  
  
                                                    Start: 2024  
End: 2024                                     iron sucrose iv piggyback 20  
0 mg in NaCl 0.9% 100 mL (VENOFER)  
   
                                                    Start: 2024  
End: 2024                                     iron sucrose iv piggyback 20  
0 mg in NaCl 0.9% 100 mL (VENOFER)  
  
  
  
Problems  
Active Problems  
  
  
                      Problem Classification Problem    Date       Documented Da  
te Episodic/Chronic  
   
                                                    Abdominal pain  
(3 sources)                             Indigestion;   
Translations:   
[Dyspepsia and other   
specified disorders of   
function of stomach]                                         Episodic  
   
                                                    Allergic reactions  
(4 sources)                             Allergic contact   
dermatitis caused by   
plant material;   
Translations: [Contact   
dermatitis and other   
eczema due to plants   
[except food]]                          Onset:   
  
4                         2024                Episodic  
   
                                                    Deficiency and other   
anemia  
(1 source)                              Iron deficiency   
anemia, unspecified;   
Translations:   
[Maternal iron   
deficiency anemia   
complicating   
pregnancy, third   
trimester]                              Onset:   
  
4                                                   Episodic  
   
                                                    Diabetes mellitus   
without complication  
(3 sources)                             Abnormal glucose   
tolerance test;   
Translations: [Other   
abnormal glucose]                       Onset:   
  
4                         2024                Episodic  
   
                                                    Diabetes or abnormal   
glucose tolerance   
complicating   
pregnancy; childbirth;   
or the puerperium  
(20 sources)                            Gestational diabetes   
mellitus;   
Translations:   
[Gestational diabetes   
mellitus in pregnancy,   
unspecified control]                    Onset:   
  
4                         2024                Episodic  
   
                                                    Esophageal disorders  
(4 sources)                             Gastroesophageal   
reflux disease;   
Translations:   
[Gastro-esophageal   
reflux disease without   
esophagitis]                            Onset:   
  
4                         2024                Chronic  
   
                                                    Fracture of lower limb  
(5 sources)                             Closed fracture of   
ankle; Translations:   
[Closed fracture of   
right ankle]                            Onset:   
  
0                         2020                Episodic  
   
                                                    Immunizations and   
screening for   
infectious disease  
(4 sources)                             Patient encounter   
status; Translations:   
[Encounter for   
screening for human   
papillomavirus (HPV)]                                         Episodic  
   
                                                    Inflammatory diseases   
of female pelvic   
organs  
(1 source)                              Cyst of Bartholin's   
gland duct;   
Translations: [Cyst of   
Bartholin's gland]                                          Episodic  
   
                                                    Malaise and fatigue  
(3 sources)                             Fatigue; Translations:   
[Other malaise and   
fatigue]                                                    Episodic  
   
                                                    Mood disorders  
(20 sources)                            Depressive disorder;   
Translations:   
[Depressive disorder,   
not elsewhere   
classified]                             Onset:   
  
4                         2024                Chronic  
   
                                                    Mood disorders  
(2 sources)                             Mood disorders;   
Translations:   
[Depression,   
unspecified]                            Onset:   
  
4                                                     
   
                                                    Other circulatory   
disease  
(2 sources)                             Elevated   
blood-pressure reading   
without diagnosis of   
hypertension;   
Translations:   
[Elevated   
blood-pressure   
reading, without   
diagnosis of   
hypertension]                           2024          Episodic  
   
                                                    Other circulatory   
disease  
(1 source)                              Elevated   
blood-pressure   
reading, without   
diagnosis of   
hypertension;   
Translations:   
[Elevated blood   
pressure reading   
without diagnosis of   
hypertension]                           Onset:   
  
4                                                   Episodic  
   
                                                    Other complications of   
pregnancy  
(20 sources)                            Maternal obesity   
complicating   
pregnancy, childbirth   
and the puerperium,   
antepartum;   
Translations: [Obesity   
complicating   
pregnancy, second   
trimester]                              Onset:   
  
4                         2024                Chronic  
   
                                                    Other complications of   
pregnancy  
(20 sources)                            Anemia of pregnancy;   
Translations: [Anemia   
complicating   
pregnancy, third   
trimester]                              Onset:   
  
4                         2024                Chronic  
   
                                                    Other complications of   
pregnancy  
(20 sources)                            Anemia in mother   
complicating   
pregnancy, childbirth   
AND/OR puerperium;   
Translations: [Anemia   
complicating   
pregnancy, third   
trimester]                              Onset:   
  
4                         2024                Chronic  
   
                                                    Other complications of   
pregnancy  
(2 sources)                             Anemia complicating   
pregnancy, third   
trimester;   
Translations:   
[Maternal iron   
deficiency anemia   
complicating   
pregnancy, third   
trimester]                              Onset:   
  
4                                                   Chronic  
   
                                                    Other complications of   
pregnancy  
(1 source)                              Obesity complicating   
pregnancy, second   
trimester;   
Translations: [Obesity   
affecting pregnancy in   
second trimester,   
unspecified obesity   
type]                                   Onset:   
  
4                                                   Chronic  
   
                                                    Other complications of   
pregnancy  
(2 sources)                             Depressive disorder;   
Translations: [Other   
mental disorders   
complicating   
pregnancy, second   
trimester]                              2024          Episodic  
   
                                                    Other complications of   
pregnancy  
(20 sources)                            High risk pregnancy;   
Translations:   
[Supervision of high   
risk pregnancy,   
unspecified, third   
trimester]                              Onset:   
  
4                         2024                Episodic  
   
                                                    Other complications of   
pregnancy  
(3 sources)                             Excessive fetal growth   
affecting management   
of mother;   
Translations:   
[Maternal care for   
excessive fetal   
growth, unspecified   
trimester, not   
applicable or   
unspecified]                            Onset:   
  
4                         2024                Episodic  
   
                                                    Other connective   
tissue disease  
(3 sources)                             Pain in lower limb;   
Translations: [Pain in   
limb]                                                       Episodic  
   
                                                    Other gastrointestinal   
disorders  
(20 sources)                            Abnormal intestinal   
absorption;   
Translations:   
[Intestinal   
malabsorption,   
unspecified]                            Onset:   
  
4                         2024                Chronic  
   
                                                    Other gastrointestinal   
disorders  
(1 source)                              Intestinal   
malabsorption,   
unspecified;   
Translations:   
[Impaired intestinal   
absorption]                             Onset:   
  
4                                                   Chronic  
   
                                                    Other nutritional;   
endocrine; and   
metabolic disorders  
(4 sources)                             Obesity; Translations:   
[Obesity, unspecified]                                         Chronic  
   
                                                    Other nutritional;   
endocrine; and   
metabolic disorders  
(20 sources)                            Body mass index 30+ -   
obesity; Translations:   
[Body mass index (BMI)   
35.0-35.9, adult]                       Onset:   
  
4                         2024                Chronic  
   
                                                    Other nutritional;   
endocrine; and   
metabolic disorders  
(1 source)                              Body mass index (BMI)   
35.0-35.9, adult;   
Translations: [BMI   
35.0-35.9,adult]                        Onset:   
  
4                                                   Chronic  
   
                                                    Polyhydramnios and   
other problems of   
amniotic cavity  
(4 sources)                             Polyhydramnios;   
Translations:   
[Polyhydramnios, third   
trimester, not   
applicable or   
unspecified]                            Onset:   
  
4                         2024                Episodic  
   
                                                    Residual codes;   
unclassified  
(2 sources)                             Gestation period, 13   
weeks; Translations:   
[13 weeks gestation of   
pregnancy]                              2024          Episodic  
   
                                                    Residual codes;   
unclassified  
(1 source)                              Gestation period, 17   
weeks; Translations:   
[17 weeks gestation of   
pregnancy]                              2024          Episodic  
   
                                                    Residual codes;   
unclassified  
(2 sources)                             Gestation period, 21   
weeks; Translations:   
[21 weeks gestation of   
pregnancy]                              2024          Episodic  
   
                                                    Residual codes;   
unclassified  
(1 source)                              Gestation period, 25   
weeks; Translations:   
[25 weeks gestation of   
pregnancy]                              2024          Episodic  
   
                                                    Residual codes;   
unclassified  
(1 source)                              Gestation period, 28   
weeks; Translations:   
[28 weeks gestation of   
pregnancy]                              2024          Episodic  
   
                                                    Residual codes;   
unclassified  
(1 source)                              Gestation period, 30   
weeks; Translations:   
[30 weeks gestation of   
pregnancy]                              06-          Episodic  
   
                                                    Residual codes;   
unclassified  
(2 sources)                             Gestation period, 33   
weeks; Translations:   
[33 weeks gestation of   
pregnancy]                              2024          Episodic  
   
                                                    Residual codes;   
unclassified  
(2 sources)                             Gestation period, 34   
weeks; Translations:   
[34 weeks gestation of   
pregnancy]                              2024          Episodic  
   
                                                    Residual codes;   
unclassified  
(2 sources)                             Gestation period, 35   
weeks; Translations:   
[35 weeks gestation of   
pregnancy]                              07-          Episodic  
   
                                                    Residual codes;   
unclassified  
(3 sources)                             Gestation period, 36   
weeks; Translations:   
[36 weeks gestation of   
pregnancy]                              2024          Episodic  
   
                                                    Residual codes;   
unclassified  
(1 source)                              34 weeks gestation of   
pregnancy;   
Translations: [34   
weeks gestation of   
pregnancy]                              Onset:   
  
4                                                   Episodic  
   
                                                    Residual codes;   
unclassified  
(1 source)                              30 weeks gestation of   
pregnancy;   
Translations: [30   
weeks gestation of   
pregnancy]                              Onset:   
  
4                                                   Episodic  
   
                                                    Residual codes;   
unclassified  
(1 source)                              28 weeks gestation of   
pregnancy;   
Translations: [28   
weeks gestation of   
pregnancy]                              Onset:   
  
4                                                   Episodic  
   
                                                    Residual codes;   
unclassified  
(1 source)                              25 weeks gestation of   
pregnancy;   
Translations: [25   
weeks gestation of   
pregnancy]                              Onset:   
  
4                                                   Episodic  
   
                                                    Unclassified  
(8 sources)                             Closed fracture of   
right ankle;   
Translations: [Closed   
right ankle fracture,   
initial encounter]                      Onset:   
  
0                         2020                  
   
                                                    Unclassified  
(20 sources)                            [facility] Pregnancy   
Education - [facility]                      Onset:   
  
4                         2024                  
   
                                                    Unclassified  
(20 sources)                            Pregnancy Education -   
[place]                                    Onset:   
  
4                         2024                  
  
  
Past or Other Problems  
  
  
                      Problem Classification Problem    Date       Documented Da  
te Episodic/Chronic  
   
                                                    Acquired foot   
deformities  
(1 source)                              Talipes varus;   
Translations:   
[Acquired clubfoot,   
right foot]                                                 Episodic  
   
                                                    Other complications of   
pregnancy  
(20 sources)                            RhD negative;   
Translations:   
[Other specified   
pregnancy related   
conditions,   
unspecified   
trimester]                              Onset:   
02-                02-                Episodic  
   
                                                    Other complications of   
pregnancy  
(20 sources)                            Heartburn;   
Translations:   
[Other specified   
pregnancy related   
conditions, second   
trimester]                              Onset:   
2024                Episodic  
   
                                                    Other non-traumatic   
joint disorders  
(2 sources)                             Ankle pain;   
Translations: [Pain   
in right ankle and   
joints of right   
foot]                                                       Episodic  
   
                                                    Other non-traumatic   
joint disorders  
(1 source)                              Instability of   
joint of right   
ankle;   
Translations:   
[Other instability,   
right ankle]                                                Episodic  
   
                                                    Other pregnancy and   
delivery including   
normal  
(20 sources)                            Normal pregnancy;   
Translations:   
[Encounter for   
supervision of   
normal first   
pregnancy, second   
trimester]                              Onset:   
2024                Episodic  
   
                                                    Other screening for   
suspected conditions   
(not mental disorders   
or infectious disease)  
(3 sources)                             Cancer cervix   
screening status;   
Translations:   
[Encounter for   
screening for   
malignant neoplasm   
of cervix]                              Onset:   
2024                                          Episodic  
   
                                                    Residual codes;   
unclassified  
(20 sources)                            Gestation period,   
32 weeks;   
Translations: [32   
weeks gestation of   
pregnancy]                              Onset:   
2024  
Resolved:   
2024                Episodic  
   
                                                    Residual codes;   
unclassified  
(1 source)                              21 weeks gestation   
of pregnancy;   
Translations: [21   
weeks gestation of   
pregnancy]                              Onset:   
2024                                          Episodic  
   
                                                    Residual codes;   
unclassified  
(1 source)                              17 weeks gestation   
of pregnancy;   
Translations: [17   
weeks gestation of   
pregnancy]                              Onset:   
2024                                          Episodic  
   
                                                    Residual codes;   
unclassified  
(1 source)                              Less than 8 weeks   
gestation of   
pregnancy;   
Translations: [7   
weeks gestation of   
pregnancy]                              Onset:   
2024                                          Episodic  
   
                                                    Unclassified  
(1 source)                                          Onset:   
2024                  
  
  
  
Results  
  
  
                      Test Name  Value      Interpretation Reference Range Facil  
ity  
   
                                                    Examination level ultrasound  
on 2024   
   
                                                                  [facility]  
   
                                                    Radiology Study   
observation   
(narrative)                                                     [facility]  
   
                                                    URINE OB DIP B/Oon   
4   
   
                      Glucose Ql (U) Negative              Neg mg/dL  [facility]  
   
                                                    Interpretation and   
review of   
laboratory results Normal                                          [facility]  
   
                                                    Protein.monoclonal   
(U) [Mass/Vol]  Negative                        Neg mg/dL       [facility]  
   
                                                    URINE OB DIP B/Oon   
4   
   
                      Glucose Ql (U) Negative              Neg mg/dL  [facility]  
   
                                                    Interpretation and   
review of   
laboratory results Normal                                          [facility]  
   
                                                    Protein.monoclonal   
(U) [Mass/Vol]  Negative                        Neg mg/dL       [facility]  
   
                                                    CNOVSPon 2024   
   
                                        CNOVSP              Visit (SP) Office   
(HEMANN)  
-------------------------  
-----  
MAY,KARINA ([id number])   
1990 F  
Date Time Provider   
Department  
24 8:30 AM TREATMENT   
 [facility]  
During your visit today,   
we recorded the following   
information about you:  
Temperature Pulse Blood   
pressure  
98.4 degrees 88/minute   
119/80  
Referring Provider:   
SHAYNA ESCALERA [[id number]]  
Allergies As of Date:   
2024  
(No Known Allergies)  
Date Reviewed: 07/15/2024  
Reviewed by: Narda Peters MD - Fully   
Assessed  
Reason for Visit:  
Non-Chemotherapy   
Treatment [795]  
Primary Visit   
Diagnosis:Impaired   
intestinal absorption   
[K90.9]  
Other Visit   
Diagnosis:Maternal iron   
deficiency anemia   
complicating pregnancy,  
third trimester [O99.013,   
D50.9]  
Order(s):[facility] NURSING   
COMMUNICATION [[id number]]   
Order #: [id number]: 1   
STANDING  
[] iron sucrose iv   
piggyback 200 mg in NaCl   
0.9% 100 mL  
(VENOFER)Disp: Rfl:  
NaCl 0.9% iv   
infusionDisp: Rfl:  
diphenhydrAMINE 50 mg   
injection (BENADRYL)Disp:   
Rfl:  
hydrocortisone sodium   
succinate (PF) 100 mg   
injection  
(Solu-CORTEF)Disp: Rfl:  
EPINEPHrine HCl (PF) 1   
mg/mL (1 mL) 0.3 mg   
injectionDisp: Rfl:  
[facility] NURSING COMMUNICATION   
[[id number]] Order #:   
[id number]: 1 STANDING  
Prescriptions as of   
2024  
- insulin NPH (NOVOLIN N   
FLEXPEN) 100 unit/mL (3   
mL) injection pen  
Inject 12 Units   
subcutaneously daily at   
bedtime.  
- omeprazole (PRILOSEC)   
20 mg capsule  
Take 1 capsule by mouth   
two times a day.  
- insulin needles,   
DISPOSABLE, (PEN NEEDLE)   
31 gauge x 5/16   
1 Each once daily.  
- Lancets  
Use as directed to check   
glucose levels up to   
seven times daily.  
- alcohol swabs (ALCOHOL   
PREP PADS)  
Use as directed to check   
glucose levels up to   
seven times daily.  
- Blood-Glucose Meter   
(FREESTYLE FREEDOM)   
monitoring kit  
1 Each as needed.  
- blood sugar diagnostic   
(FREESTYLE TEST) test   
strip  
Use with blood glucose   
test four times a day.  
- ARIPiprazole (ABILIFY)   
2 mg tablet  
Take 2 mg by mouth once   
daily. 1mg for 7 days   
then 2mg  
- sertraline (ZOLOFT) 100   
mg tablet  
Take 1 tablet by mouth   
once daily.  
- BABY ASPIRIN ORAL  
Take 81 mg by mouth once   
daily.  
- prenatal 21/iron   
fu/folic acid (PRENATAL   
COMPLETE ORAL)  
Take by mouth.  
Facility-Administered   
Medications as of   
2024  
- NaCl 0.9% iv infusion  
- diphenhydrAMINE 50 mg   
injection (BENADRYL)  
- hydrocortisone sodium   
succinate (PF) 100 mg   
injection (Solu-CORTEF)  
- EPINEPHrine HCl (PF) 1   
mg/mL (1 mL) 0.3 mg   
injection  
Problem List As Of Date   
2024 Noted Resolved  
Depressive disorder   
[F32.A] 2024  
BMI 35.0-35.9,adult   
[Z68.35] 2024  
Rh negative state in   
antepartum period   
[O26.899*02/15/2024  
Obesity affecting   
pregnancy in second   
trimester*2024  
Encounter for supervision   
of normal first   
pregn*2024  
Heartburn during   
pregnancy in second   
trimester *2024  
GDM, class A2 [O24.419]   
2024  
Anemia during pregnancy   
in third trimester   
[O99*2024  
32 weeks gestation of   
pregnancy [Z3A.32]   
2024  
Encounter for supervision   
of high risk   
pregnanc*2024  
Maternal iron deficiency   
anemia complicating   
pr*2024  
Impaired intestinal   
absorption [K90.9]   
2024  
Encounter Status:Closed   
by RENETTA ESTRADA on   
24             Normal                                  [facility]  
   
                                                    URINE OB DIP B/Oon   
4   
   
                      Glucose Ql (U) Negative              Neg mg/dL  [facility]  
   
                                                    Interpretation and   
review of   
laboratory results Normal                                          [facility]  
   
                                                    Protein.monoclonal   
(U) [Mass/Vol]  trace                           Neg mg/dL       [facility]  
   
                                                    CNPNon 07-   
   
                                        CNPN                Telephone (BRAD)  
-------------------------  
-----  
KARINA LINDSAY ([id number])   
1990 F  
Date Time Provider   
Department  
7/15/24 TOAN DOSS  
During your visit today,   
we recorded the following   
information about you:  
Annette Francis   
7/15/2024 4:06 PM Signed  
Left message for patient   
to return call. When she   
calls, please ask if we   
can  
 her Friday Iron   
infusion () to   
Wednesday () at 9 AM.  
Annette MartinezAlondra Bob   
7/15/2024 4:26 PM Signed  
Patient called back and   
stated she is unable to   
come in 24, she   
works  
the next 3 days and that   
is why she schedule for   
24  
Yana Hendrix LPN   
7/15/2024 4:34 PM Signed  
Ok, that is fine she can   
stay on Friday . Pt. Made   
aware.  
Yana Strange LPN  
Allergies As of Date:   
07/15/2024  
(No Known Allergies)  
Date Reviewed: 07/15/2024  
Reviewed by: Narda Peters MD - Fully   
Assessed  
Reason for Visit:  
Future Appointment [[id number]]  
Prescriptions as of   
07/15/2024  
- insulin NPH (NOVOLIN N   
FLEXPEN) 100 unit/mL (3   
mL) injection pen  
Inject 12 Units   
subcutaneously daily at   
bedtime.  
- omeprazole (PRILOSEC)   
20 mg capsule  
Take 1 capsule by mouth   
two times a day.  
- insulin needles,   
DISPOSABLE, (PEN NEEDLE)   
31 gauge x 5/16   
1 Each once daily.  
- Lancets  
Use as directed to check   
glucose levels up to   
seven times daily.  
- alcohol swabs (ALCOHOL   
PREP PADS)  
Use as directed to check   
glucose levels up to   
seven times daily.  
- Blood-Glucose Meter   
(FREESTYLE FREEDOM)   
monitoring kit  
1 Each as needed.  
- blood sugar diagnostic   
(FREESTYLE TEST) test   
strip  
Use with blood glucose   
test four times a day.  
- ARIPiprazole (ABILIFY)   
2 mg tablet  
Take 2 mg by mouth once   
daily. 1mg for 7 days   
then 2mg  
- sertraline (ZOLOFT) 100   
mg tablet  
Take 1 tablet by mouth   
once daily.  
- BABY ASPIRIN ORAL  
Take 81 mg by mouth once   
daily.  
- prenatal 21/iron   
fu/folic acid (PRENATAL   
COMPLETE ORAL)  
Take by mouth.  
Problem List As Of Date   
07/15/2024 Noted Resolved  
Depressive disorder   
[F32.A] 2024  
BMI 35.0-35.9,adult   
[Z68.35] 2024  
Rh negative state in   
antepartum period   
[O26.899*02/15/2024  
Obesity affecting   
pregnancy in second   
trimester*2024  
Encounter for supervision   
of normal first   
pregn*2024  
Heartburn during   
pregnancy in second   
trimester *2024  
GDM, class A2 [O24.419]   
2024  
Anemia during pregnancy   
in third trimester   
[O99*2024  
32 weeks gestation of   
pregnancy [Z3A.32]   
2024  
Encounter for supervision   
of high risk   
pregnanc*2024  
Maternal iron deficiency   
anemia complicating   
pr*2024  
Impaired intestinal   
absorption [K90.9]   
2024  
Encounter Number:   
[id number]  
Encounter Status:Closed   
by YANA STRANGE on   
7/15/24             Normal                                  [facility]  
   
                                                    ROUTINE, PRENATAL GROUP B ST  
REP PCRon 07-   
   
                                                    ROUTINE, PRENATAL   
GROUP B STREP PCR                       GROUP B STREP PCR:  
Negative for Group B   
Streptococcus by PCR. Normal                                  [facility]  
   
                                        Comment on above:   Performed By: #### G  
BPCR ####[facility]   
LABCLIA   
[id number] [place] [address] [place]   
   
                                                    URINE OB DIP B/Oon 07-  
4   
   
                      Glucose Ql (U) Negative              Neg mg/dL  [facility]  
   
                                                    Interpretation and   
review of   
laboratory results Normal                                          [facility]  
   
                                                    Protein.monoclonal   
(U) [Mass/Vol]  Negative                        Neg mg/dL       [facility]  
   
                                                    URINE OB DIP B/Oon   
4   
   
                      Glucose Ql (U) Negative              Neg mg/dL  [facility]  
   
                                                    Interpretation and   
review of   
laboratory results Normal                                          [facility]  
   
                                                    Protein.monoclonal   
(U) [Mass/Vol]  Negative                        Neg mg/dL       [facility]  
   
                                                    CBC panel Auto (Bld)on    
   
                                                    Erythrocyte   
distribution width   
(RBC) [Ratio]   15.4 %          High            11.5 - 15.0 %   [facility]  
   
                                                    Hematocrit (Bld)   
[Volume fraction] 30.2 %          Low             36.0 - 46.0 %   [facility]  
   
                                                    Hemoglobin (Bld)   
[Mass/Vol]          10.0 g/dL           Low                 11.5 - 15.5   
g/dL                                    [facility]  
   
                                                    Interpretation and   
review of   
laboratory results Abnormal                                        [facility]  
   
                                                    MCH (RBC) [Entitic   
mass]           30.9 pg                         26.0 - 34.0 pg  [facility]  
   
                                                    MCHC (RBC)   
[Mass/Vol]          33.1 g/dL                               30.5 - 36.0   
g/dL                                    [facility]  
   
                                                    MCV (RBC) [Entitic   
vol]            93.2 fL                         80.0 - 100.0 fL [facility]  
   
                                                    Nucleated RBC   
(Bld) [#/Vol]                                   NINF            [facility]  
   
                                                    Platelet mean   
volume (Bld)   
[Entitic vol]   10.0 fL                         9.0 - 12.7 fL   [facility]  
   
                                                    Platelets (Bld)   
[#/Vol]         258 10*3/uL                                     [facility]  
   
                          RBC (Bld) [#/Vol] 3.24 10*6/uL Low          3.90 - 5.2  
0   
m/uL                                    [facility]  
   
                      WBC (Bld) [#/Vol] 11.49 10*3/uL High                  [facility]  
[facility]  
   
                                                    Erythrocyte   
distribution width   
(RBC) [Ratio]   15.4 %          High            11.5-15.0       [facility]  
   
                                        Comment on above:   Order Comment: Speci  
men Type: BLOOD SPECIMEN  
Ordering Facility: [facility]  
Address: [address] [place] [address]   
   
                                                            Performed By: #### 5  
8410-2 ####  
[id number]  
[address] [place] [address] [place]   
   
                                                    Hematocrit (Bld)   
[Volume fraction] 30.2 %          Low             36.0-46.0       [facility]  
   
                                        Comment on above:   Order Comment: Speci  
men Type: BLOOD SPECIMEN  
Ordering Facility: [facility]  
Address: [address] [place] [address]   
   
                                                            Performed By: #### 5  
8410-2 ####  
[facility] [id number]  
[address] [place] [address] [place]   
   
                                                    Hemoglobin (Bld)   
[Mass/Vol]      10.0 g/dL       Low             11.5-15.5       [facility]  
   
                                        Comment on above:   Order Comment: Speci  
men Type: BLOOD SPECIMEN  
Ordering Facility: [facility]  
Address: [address] [place] [address]   
   
                                                            Performed By: #### 5  
8410-2 ####  
[facility]  
CLIA [id number]  
[address] [place] [address] [place]   
   
                                                    MCH (RBC) [Entitic   
mass]           30.9 pg         Normal          26.0-34.0       [facility]  
   
                                        Comment on above:   Order Comment: Speci  
men Type: BLOOD SPECIMEN  
Ordering Facility: [facility]  
Address: [address] [place] [address]   
   
                                                            Performed By: #### 5  
8410-2 ####  
[facility]  
CLIA [id number]  
[address] [place] [address] [place]   
   
                                                    MCHC (RBC)   
[Mass/Vol]      33.1 g/dL       Normal          30.5-36.0       [facility]  
   
                                        Comment on above:   Order Comment: Speci  
men Type: BLOOD SPECIMEN  
Ordering Facility: [facility]  
Address: [address] [place] [address]   
   
                                                            Performed By: #### 5  
8410-2 ####  
[facility] [id number]  
[address] [place] [address] [place]   
   
                                                    MCV (RBC) [Entitic   
vol]            93.2 fL         Normal          80.0-100.0      [facility]  
   
                                        Comment on above:   Order Comment: Speci  
men Type: BLOOD SPECIMEN  
Ordering Facility: [facility]  
Address: [address] [place] [address]   
   
                                                            Performed By: #### 5  
8410-2 ####  
[facility] [id number]  
[address] [place] [address] [place]   
   
                                                    Nucleated RBC   
(Bld) [#/Vol]   10*3/uL         Normal          <0.01           [facility]  
   
                                        Comment on above:   Order Comment: Speci  
men Type: BLOOD SPECIMEN  
Ordering Facility: [facility]  
Address: [address] [place] [address]   
   
                                                            Performed By: #### 5  
8410-2 ####  
[facility]  
CLIA [id number]  
[address] [place] [address] [place]   
   
                                                    Platelet mean   
volume (Bld)   
[Entitic vol]   10.0 fL         Normal          9.0-12.7        [facility]  
   
                                        Comment on above:   Order Comment: Speci  
men Type: BLOOD SPECIMEN  
Ordering Facility: [facility]  
Address: [address] [place] [address]   
   
                                                            Performed By: #### 5  
8410-2 ####  
[facility]  
CLIA [id number]  
[address] [place] [address] [place]   
   
                                                    Platelets (Bld)   
[#/Vol]         258 10*3/uL     Normal          150-400         [facility]  
   
                                        Comment on above:   Order Comment: Speci  
men Type: BLOOD SPECIMEN  
Ordering Facility: [facility]  
Address: [address] [place] [address]   
   
                                                            Performed By: #### 5  
8410-2 ####  
[facility]  
CLIA [id number]  
[address] [place] [address] [place]   
   
                      RBC (Bld) [#/Vol] 3.24 10*6/uL Low        3.90-5.20  [facility]  
   
                                        Comment on above:   Order Comment: Speci  
men Type: BLOOD SPECIMEN  
Ordering Facility: [facility]  
Address: [address] [place] [address]   
   
                                                            Performed By: #### 5  
8410-2 ####  
[facility]  
CLIA [id number]  
[address] [place] [address] [place]   
   
                      WBC (Bld) [#/Vol] 11.49 10*3/uL High       3.70-11.00 [facility]  
   
                                        Comment on above:   Order Comment: Speci  
men Type: BLOOD SPECIMEN  
Ordering Facility: [facility]  
Address: [address] [place] [address]   
   
                                                            Performed By: #### 5  
8410-2 ####  
[facility]  
CLIA [id number]  
[address] [place] [address] [place]   
   
                                                    Sravan 2024   
   
                                        CNPN                Telephone (PFS)  
-------------------------  
-----  
KARINA LINDSAY ([id number])   
1990 F  
Date Time Provider   
Department  
24 FINANCIAL   
NAVIGATOR HEMA PFS  
During your visit today,   
we recorded the following   
information about you:  
Deyvi Renee 2024   
11:23 AM Signed  
I reviewed the patient on   
the 1st-time treatment   
report. The patient does   
not  
have a cancer diagnosis   
or a chemo/radiation   
regimen. No further   
Financial  
Navigator intervention is   
needed at this time.  
Allergies As of Date:   
2024  
(No Known Allergies)  
Date Reviewed: 2024  
Reviewed by: Kelly Cervantes MA - Fully   
Assessed  
Reason for Visit:  
Benefits Investigation   
[4098]  
Prescriptions as of   
2024  
- insulin NPH (NOVOLIN N   
FLEXPEN) 100 unit/mL (3   
mL) injection pen  
Inject 12 Units   
subcutaneously daily at   
bedtime.  
- omeprazole (PRILOSEC)   
20 mg capsule  
Take 1 capsule by mouth   
two times a day.  
- insulin needles,   
DISPOSABLE, (PEN NEEDLE)   
31 gauge x 5/16   
1 Each once daily.  
- Lancets  
Use as directed to check   
glucose levels up to   
seven times daily.  
- alcohol swabs (ALCOHOL   
PREP PADS)  
Use as directed to check   
glucose levels up to   
seven times daily.  
- Blood-Glucose Meter   
(FREESTYLE FREEDOM)   
monitoring kit  
1 Each as needed.  
- blood sugar diagnostic   
(FREESTYLE TEST) test   
strip  
Use with blood glucose   
test four times a day.  
- ARIPiprazole (ABILIFY)   
2 mg tablet  
Take 2 mg by mouth once   
daily. 1mg for 7 days   
then 2mg  
- sertraline (ZOLOFT) 100   
mg tablet  
Take 1 tablet by mouth   
once daily.  
- BABY ASPIRIN ORAL  
Take 81 mg by mouth once   
daily.  
- prenatal 21/iron   
fu/folic acid (PRENATAL   
COMPLETE ORAL)  
Take by mouth.  
Problem List As Of Date   
2024 Noted Resolved  
Depressive disorder   
[F32.A] 2024  
BMI 35.0-35.9,adult   
[Z68.35] 2024  
Rh negative state in   
antepartum period   
[O26.899*02/15/2024  
Obesity affecting   
pregnancy in second   
trimester*2024  
Encounter for supervision   
of normal first   
pregn*2024  
Heartburn during   
pregnancy in second   
trimester *2024  
GDM, class A2 [O24.419]   
2024  
Anemia during pregnancy   
in third trimester   
[O99*2024  
32 weeks gestation of   
pregnancy [Z3A.32]   
2024  
Encounter for supervision   
of high risk   
pregnanc*2024  
Maternal iron deficiency   
anemia complicating   
pr*2024  
Impaired intestinal   
absorption [K90.9]   
2024  
Encounter Number:   
[id number]  
Encounter Status:Closed   
by DEYVI RENEE on   
24              Normal                                  [facility]  
   
                                                    CREATININE BLDOrdered By: Bert Mckee on 2024   
   
                                                    Creatinine   
[Mass/Vol]          0.57 mg/dL          Low                 0.58 - 0.96   
mg/dL                                   [facility]  
   
                                                    GFR/1.73 sq   
M.predicted among   
non-blacks MDRD   
(S/P/Bld) [Vol   
rate/Area]      122 mL/min/{1.73_m2}                 - PINF          [facility]  
   
                                        Comment on above:   Estimated Glomerular  
 Filtration Rate (eGFR) is calculated   
using the   
 CKD-EPI creatinine equation. This equation utilizes serum   
creatinine, sex, and age as parameters. The creatinine assay has   
traceable calibration to isotope dilution-mass spectrometry. Refer   
to KDIGO guidelines for clinical interpretation. In patients with   
unstable renal function, e.g. those with acute kidney injury, the   
eGFR may not accurately reflect actual GFR.   
   
                                                    Interpretation and   
review of   
laboratory results Abnormal                                        [facility]  
   
                                                    CREATININE BLDon 2024   
   
                                                    Creatinine   
[Mass/Vol]      0.57 mg/dL      Low             0.58-0.96       [facility]  
   
                                        Comment on above:   Order Comment: Speci  
men Type: BLOOD SPECIMENOrdering Facility:  
   
[facility] Address: [address] [place] [address]   
   
                                                            Performed By: #### C  
RET1, 3084-1 ####[facility] PABLOMary [facility]algateNANCY [id number] [address] [place]#### [address] ####[facility]   
MILLTOWNCLIA [id number] [address] LABCLIA   
[id number] [place] [address] [place]   
   
                                                    Creatinine and   
Glomerular   
filtration   
rate.predicted   
panel (S/P/Bld) 122 mL/min/1.73m??? Normal          >=60            [facility]  
   
                                        Comment on above:   Order Comment: Speci  
men Type: BLOOD SPECIMENOrdering Facility:  
   
[facility] Address: [address] [place] [address]   
   
                                                            Result Comment: Harry  
mated Glomerular Filtration Rate (eGFR) is   
calculated using the  CKD-EPI creatinine equation. This   
equation utilizes serum creatinine, sex, and age as parameters. The   
creatinine assay has traceable calibration to isotope dilution-mass   
spectrometry. Refer to KDIGO guidelines for clinical   
interpretation. In patients with unstable renal function, e.g.   
those with acute kidney injury, the eGFR may not accurately reflect   
actual GFR.   
   
                                                            Performed By: #### C  
RET1, -1 ####[facility]   
MILLTOWNCLIA [id number] [place] [address]   
[place]#### [address]-3 ####[facility]   
MILLTOWNCLIA [id number] [address] STATES [facility] LABCLIA   
[id number] [place] [address] [place]   
   
                                                    Hepatic function [address]  
n 2024   
   
                      Albumin [Mass/Vol] 3.4 g/dL   Low        3.9-4.9    [facility]  
   
                                        Comment on above:   Order Comment: Speci  
men Type: BLOOD SPECIMENOrdering Facility:  
   
[facility] Address: [address] [place] [address]   
   
                                                            Performed By: #### C  
RET1, 3084-1 ####[facility]   
MILLTOWNCLIA [id number] [place] [address]   
[place]#### [address]-3 ####[facility]   
MILLTOWNCLIA [id number] [place] [address]   
[place] [facility] LABCLIA   
[id number] [place] [address] [place]   
   
                                                    ALP [Catalytic   
activity/Vol]   108 U/L         Normal                    [facility]  
   
                                        Comment on above:   Order Comment: Speci  
men Type: BLOOD SPECIMENOrdering Facility:  
  
[facility] Address: [address] [place] [address]   
   
                                                            Performed By: #### C  
RET1, 3084-1 ####[facility]   
MILLTOWNCLIA [id number] [place] [address]   
[place]#### [address]-3 ####[facility]   
MILLTOWNCLIA [id number] [place] [address]   
[place] [facility] LABCLIA   
[id number] [place] [address] [place]   
   
                                                    ALT [Catalytic   
activity/Vol]   15 U/L          Normal          7-38            [facility]  
   
                                        Comment on above:   Order Comment: Speci  
men Type: BLOOD SPECIMENOrdering Facility:  
  
[facility] Address: [address] [place] [address]   
   
                                                            Performed By: #### C  
RET1, 4-1 ####[facility]   
MILLTOWNCLIA [id number] [place] [address]   
[place]#### [address]-3 ####[facility]   
MILLTOWNCLIA [id number] [place] [address]   
[place] [facility] LABCLIA   
[id number] [place] [address] [place]   
   
                                                    AST [Catalytic   
activity/Vol]   17 U/L          Normal          13-35           [facility]  
   
                                        Comment on above:   Order Comment: Speci  
men Type: BLOOD SPECIMENOrdering Facility:  
  
[facility] Address: [address] [place] [address]   
   
                                                            Performed By: #### C  
RET1, -1 ####[facility] PABLO   
MILLTOWNCLIA [id number] [place] [address]   
[place]#### [address]-3 ####[facility]   
MILLTOWNCLIA [id number] [place] [address]   
[place] [facility] LABCLIA   
[id number] [place] [address] [place]   
   
                                                    Bilirubin   
[Mass/Vol]      mg/dL           Low             0.2-1.3         [facility]  
   
                                        Comment on above:   Order Comment: Speci  
men Type: BLOOD SPECIMENOrdering Facility:  
   
[facility] Address: [address] [place] [address]   
   
                                                            Performed By: #### C  
RET1, 3084 ####[facility]   
MILLTOWNCLIA [id number] [place] [address]   
[place]#### [address]-3 ####[facility]   
MILLTOWNCLIA [id number] [place] [address]   
[place] [facility] LABCLIA   
[id number] [place] [address] [place]   
   
                                                    Bilirubin.conjugat  
ed [Mass/Vol]   mg/dL           Normal          <0.2            [facility]  
   
                                        Comment on above:   Order Comment: Speci  
men Type: BLOOD SPECIMENOrdering Facility:  
   
[facility] Address: [address] [place] [address]   
   
                                                            Performed By: #### C  
RET1, -1 ####[facility]   
MILLTOWNCLIA [id number] [place] [address]   
[place]#### [address]-3 ####[facility]   
MILLTOWNCLIA [id number] [address] LABCLIA   
[id number] [address]   
STATES [facility]   
   
                      Protein [Mass/Vol] 5.9 g/dL   Low        6.3-8.0    [facility]  
   
                                        Comment on above:   Order Comment: Speci  
men Type: BLOOD SPECIMENOrdering Facility:  
   
[facility] Address: [address] [place] [address]   
   
                                                            Performed By: #### C  
RET1, 3084-1 ####[facility]aWNCLIA [id number] [address] OF [facility]#### [address]-3 ####[facility]A [id number] [address] LABIA   
[id number] [address]   
[place]   
   
                                                    Prot/Creat Uron 2024   
   
                                                    Protein/Creatinine   
(U) [Mass ratio] 0.14 mg/mg      Normal          <0.15           [facility]  
   
                                        Comment on above:   Order Comment: Speci  
men Type: URINE SPECIMENOrdering Facility:  
  
[facility] Address: [address] [place] [address]   
   
                                                            Result Comment: Adul  
t Proteinuria Categories:  
<0.15 mg/mg is considered normal to mildly increased  
0.15 - 0.50 mg/mg is considered moderately increased  
>0.50 mg/mg is considered severely increased  
KDIGO. (2013). KDIGO 2012 Clinical Practice Guideline for the   
Evaluation and Management of Chronic Kidney Disease. Official   
Journal of the International Society of Nephrology, 3(1), 1-150.   
   
                                                            Performed By: #### 2  
890-2 ####[facility] LABCLIA   
[id number] [address]   
[place]   
   
                                                    Protein/Creatinine (U) [Mass  
 ratio]on 2024   
   
                                                    Creatinine (U)   
[Mass/Vol]      71.7 mg/dL      Normal          20.0-300.0      [facility]  
   
                                        Comment on above:   Order Comment: Speci  
men Type: URINE SPECIMENOrdering Facility:  
   
[facility] Address: [address] [place] [address]   
   
                                                            Performed By: #### 2  
890-2 ####[facility] LABCLIA   
[id number] [place] [address] [place]   
[place]   
   
                                                    Protein (U)   
[Mass/Vol]      10 mg/dL        Normal          0-20            [facility]  
   
                                        Comment on above:   Order Comment: Speci  
men Type: URINE SPECIMENOrdering Facility:  
   
[facility] Address: [address] [place] [address]   
   
                                                            Performed By: #### 2  
890-2 ####[facility] LABCLIA   
[id number] [place] [address] [place]   
[place]   
   
                                                    URIC ACIDon 2024   
   
                      Urate [Mass/Vol] 4.6 mg/dL             2.5 - 6.6 mg/dL [facility]  
   
                                                    URINE OB DIP B/Oon   
4   
   
                      Glucose Ql (U) Negative              Neg mg/dL  [facility]  
   
                                                    Interpretation and   
review of   
laboratory results Normal                                          [facility]  
   
                                                    Protein.monoclonal   
(U) [Mass/Vol]  Negative                        Neg mg/dL       [facility]  
   
                                                    Urate SerPl-mCncon   
4   
   
                      Urate [Mass/Vol] 4.6 mg/dL  Normal     2.5-6.6    [facility]  
   
                                        Comment on above:   Order Comment: Speci  
men Type: BLOOD SPECIMENOrdering Facility:  
   
[facility] Address: [address] [place] [address]   
   
                                                            Performed By: #### C  
RET1, 3084-1 ####[facility]aWNCLIA [id number] [place] [address]   
[place]#### 24325-3 ####[facility]aWNCLIA [id number] [place] [address]   
[place] [facility] LABCLIA   
[id number] [facility] [id number] [place] [id number] [place]   
[place]   
   
                                                    Urate [Mass/Vol]on   
4   
   
                                                    Interpretation and   
review of   
laboratory results Normal                                          [facility]  
   
                                                    URINE OB DIP B/Oon   
4   
   
                      Glucose Ql (U) Negative              Neg mg/dL  [facility]  
   
                                                    Protein.monoclonal   
(U) [Mass/Vol]  trace                           Neg mg/dL       [facility]  
   
                                                    URINE OB DIP B/Oon   
4   
   
                      Glucose Ql (U) Negative              Neg mg/dL  [facility]  
   
                                                    Interpretation and   
review of   
laboratory results Normal                                          [facility]  
   
                                                    Protein.monoclonal   
(U) [Mass/Vol]  Negative                        Neg mg/dL       [facility]  
   
                                                    URINE OB DIP B/Oon   
4   
   
                      Glucose Ql (U) Negative              Neg mg/dL  [facility]  
   
                                                    Interpretation and   
review of   
laboratory results Normal                                          [facility]  
   
                                                    Protein.monoclonal   
(U) [Mass/Vol]  Negative                        Neg mg/dL       [facility]  
   
                                                    CBC panel Auto (Bld)on    
   
                                                    Erythrocyte   
distribution width   
(RBC) [Ratio]   15.6 %          High            11.5-15.0       [facility]  
   
                                        Comment on above:   Order Comment: Speci  
men Type: BLOOD SPECIMENOrdering Facility:  
   
[facility] Address: [address] [place] [address]   
   
                                                            Performed By: #### 5  
8410-2 ####[facility] [id number] [place] [address]   
[place]   
   
                                                    Hematocrit (Bld)   
[Volume fraction] 31.2 %          Low             36.0-46.0       [facility]  
   
                                        Comment on above:   Order Comment: Speci  
men Type: BLOOD SPECIMENOrdering Facility:  
  
[facility] Address: [address] [place] [address]   
   
                                                            Performed By: #### 5  
8410-2 ####[facility] [id number] [place] [address]   
[place]   
   
                                                    Hemoglobin (Bld)   
[Mass/Vol]      10.3 g/dL       Low             11.5-15.5       [facility]  
   
                                        Comment on above:   Order Comment: Speci  
men Type: BLOOD SPECIMENOrdering Facility:  
   
[facility] Address: [address] [place] [address]   
   
                                                            Performed By: #### 5  
8410-2 ####[facility]   
SPENSERGraceROSIE [id number] [address]   
   
                                                    MCH (RBC) [Entitic   
mass]           30.6 pg         Normal          26.0-34.0       [facility]  
   
                                        Comment on above:   Order Comment: Speci  
men Type: BLOOD SPECIMENOrdering Facility:  
  
[facility] Address: [address] [place] [address]   
   
                                                            Performed By: #### 5  
8410-2 ####[facility] [id number] [address] [place]   
   
                                                    MCHC (RBC)   
[Mass/Vol]      33.0 g/dL       Normal          30.5-36.0       [facility]  
   
                                        Comment on above:   Order Comment: Speci  
men Type: BLOOD SPECIMENOrdering Facility:  
   
[facility] Address: [address] [place] [address]   
   
                                                            Performed By: #### 5  
8410-2 ####[facility] [id number] [address] [place]   
   
                                                    MCV (RBC) [Entitic   
vol]            92.6 fL         Normal          80.0-100.0      [facility]  
   
                                        Comment on above:   Order Comment: Speci  
men Type: BLOOD SPECIMENOrdering Facility:  
  
[facility] Address: [address] [place] [address]   
   
                                                            Performed By: #### 5  
8410-2 ####[facility] HospitalNCLI [id number] [address] [place]   
   
                                                    Nucleated RBC   
(Bld) [#/Vol]   10*3/uL         Normal          <0.01           [facility]  
   
                                        Comment on above:   Order Comment: Speci  
men Type: BLOOD SPECIMENOrdering Facility:  
   
[facility] Address: [address] [place] [address]   
   
                                                            Performed By: #### 5  
8410-2 ####[facility]   
SPENSERDORANCLIA [id number] [place] [address]   
[place]   
   
                                                    Platelet mean   
volume (Bld)   
[Entitic vol]   9.8 fL          Normal          9.0-12.7        [facility]  
   
                                        Comment on above:   Order Comment: Speci  
men Type: BLOOD SPECIMENOrdering Facility:  
  
[facility] Address: [address] [place] [address]   
   
                                                            Performed By: #### 5  
8410-2 ####[facility] HospitalLIA [id number] [place] [address]   
[place]   
   
                                                    Platelets (Bld)   
[#/Vol]         284 10*3/uL     Normal          150-400         [facility]  
   
                                        Comment on above:   Order Comment: Speci  
men Type: BLOOD SPECIMENOrdering Facility:  
   
[facility] Address: [address] [place] [address]   
   
                                                            Performed By: #### 5  
8410-2 ####[facility] HospitalNCA [id number] [place] [address]   
[place]   
   
                      RBC (Bld) [#/Vol] 3.37 10*6/uL Low        3.90-5.20  [facility]  
   
                                        Comment on above:   Order Comment: Speci  
men Type: BLOOD SPECIMENOrdering Facility:  
   
[facility] Address: [address] [place] [address]   
   
                                                            Performed By: #### 5  
8410-2 ####[address] HospitalLIA [id number] [place] [address]   
[place]   
   
                      WBC (Bld) [#/Vol] 13.99 10*3/uL High       3.70-11.00 [facility]  
   
                                        Comment on above:   Order Comment: Speci  
men Type: BLOOD SPECIMENOrdering Facility:  
   
[facility] Address: [address] [place] [address]   
   
                                                            Performed By: #### 5  
8410-2 ####[address] HospitalNANCY [id number] [place] [id number]   
[facility]   
   
                                                    Sravan 2024   
   
                                        CNPN                Telephone (OBGYWM)  
-------------------------  
-----  
KARINA LINDSAY ([id number])   
1990 F  
Date Time Provider   
Department  
24 SHAYNA ESCALERA  
During your visit today,   
we recorded the following   
information about you:  
Narda Blanco RN   
2024 4:48 PM Signed  
----- Message from Shayna Escalera MD sent at   
2024 4:43 PM EDT   
-----  
Add to prenatal record  
No improvement in her   
anemia. Blood management   
referral placed  
Narda Blanco RN   
2024 4:49 PM Signed  
Left message for patient   
to call office.  
VALAREI Mota Kimberly, LPN   
2024 8:23 AM Signed  
Pt returned call and   
given below results and   
instructions. Pt voiced  
understanding. Chart   
forwarded to Blood   
management. Will monitor   
for pt to be  
scheduled. Kasey Osuna LPN'  
Allergies As of Date:   
2024  
(No Known Allergies)  
Date Reviewed: 2024  
Reviewed by: Janet Lilly APRN.ROXANNEM -   
Fully Assessed  
Reason for Visit:  
Results [95]  
Prescriptions as of   
2024  
- insulin NPH (NOVOLIN N   
FLEXPEN) 100 unit/mL (3   
mL) injection pen  
Inject 12 Units   
subcutaneously daily at   
bedtime.  
- omeprazole (PRILOSEC)   
20 mg capsule  
Take 1 capsule by mouth   
two times a day.  
- insulin needles,   
DISPOSABLE, (PEN NEEDLE)   
31 gauge x 5/16   
1 Each once daily.  
- Lancets  
Use as directed to check   
glucose levels up to   
seven times daily.  
- alcohol swabs (ALCOHOL   
PREP PADS)  
Use as directed to check   
glucose levels up to   
seven times daily.  
- Blood-Glucose Meter   
(FREESTYLE FREEDOM)   
monitoring kit  
1 Each as needed.  
- blood sugar diagnostic   
(FREESTYLE TEST) test   
strip  
Use with blood glucose   
test four times a day.  
- ARIPiprazole (ABILIFY)   
2 mg tablet  
Take 2 mg by mouth once   
daily. 1mg for 7 days   
then 2mg  
- sertraline (ZOLOFT) 100   
mg tablet  
Take 1 tablet by mouth   
once daily.  
- BABY ASPIRIN ORAL  
Take 81 mg by mouth once   
daily.  
- prenatal 21/iron   
fu/folic acid (PRENATAL   
COMPLETE ORAL)  
Take by mouth.  
Problem List As Of Date   
2024 Noted Resolved  
Depressive disorder   
[F32.A] 2024  
BMI 35.0-35.9,adult   
[Z68.35] 2024  
Rh negative state in   
antepartum period   
[O26.899*02/15/2024  
Obesity affecting   
pregnancy in second   
trimester*2024  
Encounter for supervision   
of normal first   
pregn*2024  
Heartburn during   
pregnancy in second   
trimester *2024  
GDM, class A2 [O24.419]   
2024  
Anemia during pregnancy   
in third trimester   
[O99*2024  
32 weeks gestation of   
pregnancy [Z3A.32]   
2024  
Encounter for supervision   
of high risk   
pregnanc*2024  
Encounter Number:   
[id number]  
Encounter Status:Closed   
by ANRDA BLANCO on   
24             [facility]                Telephone (OBGYWM)  
-------------------------  
-----  
KARINA LINDSAY ([id number])   
1990 F  
Date Time Provider   
Department  
24 SHAYNA ESCALERA   
OBGYWM  
During your visit today,   
we recorded the following   
information about you:  
Narda Blanco, VALARIE   
2024 12:55 PM Signed  
32w2d  
Received prescription   
request from [facility]   
Pharmacies for a Dexcom   
glucose  
monitoring system. RX to   
SW to review. Patient   
seen in office with CP   
today.  
VALARIE Mota Sara, MD   
2024 1:42 PM Signed  
signed  
Narda Blanco RN   
2024 2:06 PM Signed  
Faxed  
Allergies As of Date:   
2024  
(No Known Allergies)  
Date Reviewed: 2024  
Reviewed by: Janet Lilly APRN.CNM -   
Fully Assessed  
Reason for Visit:  
RX Request - Dexcom   
[Other]  
Prescriptions as of   
2024  
- insulin NPH (NOVOLIN N   
FLEXPEN) 100 unit/mL (3   
mL) injection pen  
Inject 12 Units   
subcutaneously daily at   
bedtime.  
- omeprazole (PRILOSEC)   
20 mg capsule  
Take 1 capsule by mouth   
two times a day.  
- insulin needles,   
DISPOSABLE, (PEN NEEDLE)   
31 gauge x 5/16   
1 Each once daily.  
- Lancets  
Use as directed to check   
glucose levels up to   
seven times daily.  
- alcohol swabs (ALCOHOL   
PREP PADS)  
Use as directed to check   
glucose levels up to   
seven times daily.  
- Blood-Glucose Meter   
(FREESTYLE FREEDOM)   
monitoring kit  
1 Each as needed.  
- blood sugar diagnostic   
(FREESTYLE TEST) test   
strip  
Use with blood glucose   
test four times a day.  
- ARIPiprazole (ABILIFY)   
2 mg tablet  
Take 2 mg by mouth once   
daily. 1mg for 7 days   
then 2mg  
- sertraline (ZOLOFT) 100   
mg tablet  
Take 1 tablet by mouth   
once daily.  
- BABY ASPIRIN ORAL  
Take 81 mg by mouth once   
daily.  
- prenatal 21/iron   
fu/folic acid (PRENATAL   
COMPLETE ORAL)  
Take by mouth.  
Problem List As Of Date   
2024 Noted Resolved  
Depressive disorder   
[F32.A] 2024  
BMI 35.0-35.9,adult   
[Z68.35] 2024  
Rh negative state in   
antepartum period   
[O26.899*02/15/2024  
Obesity affecting   
pregnancy in second   
trimester*2024  
Encounter for supervision   
of normal first   
pregn*2024  
Heartburn during   
pregnancy in second   
trimester *2024  
GDM, class A2 [O24.419]   
2024  
Anemia during pregnancy   
in third trimester   
[O99*2024  
32 weeks gestation of   
pregnancy [Z3A.32]   
2024  
Encounter for supervision   
of high risk   
pregnanc*2024  
Encounter Number:   
[id number]  
Encounter Status:Closed   
by NARDA BLANCO on   
24             [facility]                Telephone (OBGYWM)  
-------------------------  
-----  
KARINA LINDSAY ([id number])   
1990 F  
Date Time Provider   
Department  
24 SHAYNA ESCALERA   
OBGYWM  
During your visit today,   
we recorded the following   
information about you:  
Shayna Escalera MD   
2024 4:43 PM Signed  
See result note  
Blood management referral   
placed  
Allergies As of Date:   
2024  
(No Known Allergies)  
Date Reviewed: 2024  
Reviewed by: Janet Lilly APRN.ROXANNEM -   
Fully Assessed  
Reason for Visit:  
Orders [681]  
Primary Visit   
Diagnosis:Anemia during   
pregnancy in third   
trimester [O99.013]  
Order(s):BLOOD MANAGEMENT   
REFERRAL [[id number]] Order   
#: [id number]: 1  
Prescriptions as of   
2024  
- insulin NPH (NOVOLIN N   
FLEXPEN) 100 unit/mL (3   
mL) injection pen  
Inject 12 Units   
subcutaneously daily at   
bedtime.  
- omeprazole (PRILOSEC)   
20 mg capsule  
Take 1 capsule by mouth   
two times a day.  
- insulin needles,   
DISPOSABLE, (PEN NEEDLE)   
31 gauge x 5/16   
1 Each once daily.  
- Lancets  
Use as directed to check   
glucose levels up to   
seven times daily.  
- alcohol swabs (ALCOHOL   
PREP PADS)  
Use as directed to check   
glucose levels up to   
seven times daily.  
- Blood-Glucose Meter   
(FREESTYLE FREEDOM)   
monitoring kit  
1 Each as needed.  
- blood sugar diagnostic   
(FREESTYLE TEST) test   
strip  
Use with blood glucose   
test four times a day.  
- ARIPiprazole (ABILIFY)   
2 mg tablet  
Take 2 mg by mouth once   
daily. 1mg for 7 days   
then 2mg  
- sertraline (ZOLOFT) 100   
mg tablet  
Take 1 tablet by mouth   
once daily.  
- BABY ASPIRIN ORAL  
Take 81 mg by mouth once   
daily.  
- prenatal 21/iron   
fu/folic acid (PRENATAL   
COMPLETE ORAL)  
Take by mouth.  
Problem List As Of Date   
2024 Noted Resolved  
Depressive disorder   
[F32.A] 2024  
BMI 35.0-35.9,adult   
[Z68.35] 2024  
Rh negative state in   
antepartum period   
[O26.899*02/15/2024  
Obesity affecting   
pregnancy in second   
trimester*2024  
Encounter for supervision   
of normal first   
pregn*2024  
Heartburn during   
pregnancy in second   
trimester *2024  
GDM, class A2 [O24.419]   
2024  
Anemia during pregnancy   
in third trimester   
[O99*2024  
32 weeks gestation of   
pregnancy [Z3A.32]   
2024  
Encounter for supervision   
of high risk   
pregnanc*2024  
Encounter Number:   
[id number]  
Encounter Status:Closed   
by SHAYNA ESCALERA on   
24             Normal                                  [facility]  
   
                                                    Examination level ultrasound  
on 2024   
   
                                                                  [facility]  
   
                                                    Radiology Study   
observation   
(narrative)                                                     [facility]  
   
                                                    Ferritin SerPl-mCncon 2024   
   
                                                    Ferritin   
[Mass/Vol]      31.3 ng/mL      Normal          14.7-205.1      [facility]  
   
                                        Comment on above:   Order Comment: Speci  
men Type: BLOOD SPECIMEN  
Ordering Facility: [facility]  
Address: [address] [place] [address]   
   
                                                            Performed By: #### 5  
8410-2 ####  
[facility]  
CLIA [id number]  
[address] [place] [address] [place]   
   
                                                    Iron and Iron binding capaci  
ty panelon 2024   
   
                      Iron [Mass/Vol] 110 ug/dL  Normal          [facility]  
   
                                        Comment on above:   Order Comment: Speci  
men Type: BLOOD SPECIMEN  
Ordering Facility: [facility]  
Address: [address] [place] [address]   
   
                                                            Performed By: #### 5  
8410-2 ####  
[facility]  
CLIA [id number]  
[address]   
   
                                                    Iron binding   
capacity   
[Mass/Vol]      500 ug/dL       High            232-386         [facility]  
   
                                        Comment on above:   Order Comment: Speci  
men Type: BLOOD SPECIMEN  
Ordering Facility: [facility]  
Address: [address] [place] [address]   
   
                                                            Performed By: #### 5  
8410-2 ####  
[facility]  
CLIA [id number]  
[address] [place] [address] [place]   
   
                                                    Iron/TIBC [Molar   
ratio]          22.0 %          Normal          15.0-57.0       [facility]  
   
                                        Comment on above:   Order Comment: Speci  
men Type: BLOOD SPECIMEN  
Ordering Facility: [facility]  
Address: [address] [place] [address]   
   
                                                            Performed By: #### 5  
8410-2 ####  
[facility]  
CLIA [id number]  
[address] [place] [address]   
   
                                                    Sravan 06-   
   
                                        CNPN                Telephone (OBGYWM)  
-------------------------  
-----  
KARINA LINDSAY ([id number])   
1990 F  
Date Time Provider   
Department  
6/10/24 SHAYNA ESCALERA   
OBGYWM  
During your visit today,   
we recorded the following   
information about you:  
Kasey Osuna LPN   
6/10/2024 2:52 PM Signed  
FMLA paperwork completed,   
copy scanned into EMR and   
filed in nurses station.  
Original placed in nurses   
station to be given to pt   
at her next visit.   
Kasey Osuna LPN'  
Allergies As of Date:   
06/10/2024  
(No Known Allergies)  
Date Reviewed: 06/10/2024  
Reviewed by: Kelly Cervantes MA - Fully   
Assessed  
Reason for Visit:  
FMLA Paperwork [[id number]]  
Prescriptions as of   
06/10/2024  
- insulin NPH (NOVOLIN N   
FLEXPEN) 100 unit/mL (3   
mL) injection pen  
Inject 8 Units   
subcutaneously daily at   
bedtime.  
- Lancets  
Use as directed to check   
glucose levels up to   
seven times daily.  
- alcohol swabs (ALCOHOL   
PREP PADS)  
Use as directed to check   
glucose levels up to   
seven times daily.  
- Blood-Glucose Meter   
(FREESTYLE FREEDOM)   
monitoring kit  
1 Each as needed.  
- blood sugar diagnostic   
(FREESTYLE TEST) test   
strip  
Use with blood glucose   
test four times a day.  
- ARIPiprazole (ABILIFY)   
2 mg tablet  
Take 2 mg by mouth once   
daily. 1mg for 7 days   
then 2mg  
- sertraline (ZOLOFT) 100   
mg tablet  
Take 1 tablet by mouth   
once daily.  
- omeprazole (PRILOSEC)   
20 mg capsule  
Take 1 capsule by mouth   
two times a day.  
- BABY ASPIRIN ORAL  
Take 81 mg by mouth once   
daily.  
- prenatal 21/iron   
fu/folic acid (PRENATAL   
COMPLETE ORAL)  
Take by mouth.  
Problem List As Of Date   
06/10/2024 Noted Resolved  
Depressive disorder   
[F32.A] 2024  
BMI 35.0-35.9,adult   
[Z68.35] 2024  
Rh negative state in   
antepartum period   
[O26.899*02/15/2024  
Obesity affecting   
pregnancy in second   
trimester*2024  
Encounter for supervision   
of normal first   
pregn*2024  
Heartburn during   
pregnancy in second   
trimester *2024  
Diet controlled   
gestational diabetes   
mellitus (*2024  
Anemia during pregnancy   
in third trimester   
[O99*2024  
Encounter Number:   
[id number]  
Encounter Status:Closed   
by KASEY OSUNA on   
6/10/24             Normal                                  [facility]  
   
                                                    URINE OB DIP B/Oon 06-  
4   
   
                      Glucose Ql (U) Negative              Neg mg/dL  [facility]  
   
                                                    Interpretation and   
review of   
laboratory results Normal                                          [facility]  
   
                                                    Protein.monoclonal   
(U) [Mass/Vol]  Negative                        Neg mg/dL       [facility]  
   
                                                    CNNURSEon 2024   
   
                                        CNNURSE             Nurse Visit (EDEDSJ)  
-------------------------  
-----  
KARINA LINDSAY ([id number])   
1990 F  
Date Time Provider   
Department  
24 2:00 PM SARAH DE LA CRUZ  
During your visit today,   
we recorded the following   
information about you:  
Sarah De La Cruz, RN 2024   
4:01 PM Signed  
DIABETES SELF-MANAGEMENT   
EDUCATION AND SUPPORT  
Location: [facility]  
Type of visit: Virtual   
(with video) individual  
I have communicated my   
name and active   
licensure. The patient's   
identity and  
physical location were   
verified at the time of   
this visit. Either the   
patient  
or their legal   
representative has been   
informed of the risks and   
benefits of --  
and alternatives to --   
treatment through a   
remote evaluation and   
consents to  
proceed with the   
evaluation remotely.  
Types of DSMES:   
Initial/Comprehensive   
(add to or update ADA   
spreadsheet)  
PATIENT'S MAIN CONCERN   
TODAY: target goals for   
BG  
Support person present   
for education today: none  
Cognitive ability: Alert   
and oriented  
Motivation to learn:   
Interested  
Learning barriers   
identified by educator:   
none  
Method of instruction:   
written and verbal  
INTERVENTIONS/TOPICS   
COVERED:  
-Diabetes   
Pathophysiology:   
gestational diabetes   
basics  
-Monitoring: BG targets,   
using a home glucose   
monitor, and testing   
frequency  
-Healthy Eating: impact   
of carbs on BG, Plate   
Method, basic carb   
counting,  
foods with carbs, portion   
sizes, reading food   
labels, and   
recommendation for 30  
grams carb at breakfast,   
15-30 grams for daytime   
snacks, 30 grams for   
bedtime  
snack, 45 to 60 grams for   
lunch and dinner, include   
healthy source of protein  
with all meals and snacks  
-Physical Activity:   
benefits of exercise and   
types of exercise  
-Acute Complications:   
hypoglycemia s/sx/tx and   
hyperglycemia s/sx/tx  
DIABETES ASSESSMENT:  
Referring Physician:   
Bobbi Jalloh  
Previous Diabetes   
Education? No, did meet   
with dietitian, reviewed   
meal plan  
today  
What are you hoping to   
gain from this visit? Not   
aware of BG targets  
In your words, what is   
gestational diabetes? The   
body's resistance to   
insulin  
during pregnancy for many   
reasons  
What concerns you about   
having gestational   
diabetes? No specific   
concerns  
Diabetes History:  
Type of Diabetes:   
Gestational ( diabetes in   
pregnancy)  
How far along is your   
pregnancy? Weeks: 31  
Does anyone in your   
family have diabetes? yes   
maternal side,   
grandfather and  
aunts  
How do you learn   
best?listening and   
reading  
Demographics:  
Highest level of   
education: Post-graduate   
degree, Nurse Practioner   
at [employer]  
Race/Ethnic Origin:   
White/  
Does you culture or   
Latter day require any of   
the following: No  
cultural/Holiness   
practices affecting DM  
Do you have problems   
with: No difficulty   
seeing/hearing/reading/wr  
iting/speaking  
Occupation: nurse   
practitioner  
Work hours: asked/not   
answered  
Support System:  
How often does someone   
help you read hospital   
materials? never  
How often does someone   
help you read your pill   
bottles? never  
How often does someone   
have to help you take   
care of your diabetes?   
never  
Major stressors:  father   
of baby currently   
inpatient rehab,   
concerned when he  
is released will want to   
be involved in her life   
again but she has   
informed him  
it is over unless   
significant changes made   
by him . Questioned if   
she is  
concerned about her   
safety and she denied,  I   
don't think he would be   
reactive,  
but you never know.    
Recommended she inform   
her OB/GYN or PCP if she   
feels  
threatened, or this   
educator can assist with   
referral to social  
services/resources. She   
declined at this time.  
How do you manage stress?   
Mother is supportive,   
likes to work on crafts.  
Do any of the following   
things get in the way of   
managing your diabetes?  
Nothing gets in the way   
of DM management  
Health History:  
Do you use tobacco? No  
Do you use alcohol? No  
In the past 12 months   
have you had any:  
Hospital Admissions:   
Asked/not answered  
ER Visits: Asked/not   
answered  
Primary Care Visits:   
Asked/not answered  
What are your general   
feelings about you   
overall health? Asked/not   
answered  
Medical   
Issues/Complications:   
presently low iron   
levels, history of   
depression  
To whom are you reporting   
your blood sugar levels?   
High-Risk OB  
PAST MEDICAL HISTORY  
Diagnosis Date  
Abnormal Pap smear of   
cervix  
Cervical dysplasia, mild   
2016  
Most recent A1C  
Lab Results  
Component Value Date  
HBA1C 5.0 2024  
Physical Activity:  
Do you do a regular   
exercise? No, has a farm   
and very active in   
maintaining but  
feeling low energy in   
last trimester  
Sleep:  
Do you get at least 7 hrs   
of sleep most nights? yes  
Current Outpatient   
Medications  
Medication Sig  
Lancets Use as directed   
to check glucose levels   
up to seven times daily.  
alcohol swabs (ALCOHOL   
PREP PADS) Use as   
directed to check glucose   
levels up  
to seven times daily.  
Blood-Glucose Meter   
(FREESTYLE FREEDOM)   
monitoring kit 1 Each as   
needed.  
blood sugar diagnostic   
(FREESTYLE TEST) test   
strip Use with blood   
glucose fransisco (more content   
not included)...    Normal                                  [facility]  
   
                                                    CNPNon 2024   
   
                                        CNPN                Telephone (ENLKW)  
-------------------------  
-----  
KARINA LINDSAY ([id number])   
1990 F  
Date Time Provider   
Department  
24 KOLBY MONTESINOS  
During your visit today,   
we recorded the following   
information about you:  
Renteta Butler   
2024 7:46 AM Signed  
----- Message from Kolby Montesinos RD sent at   
2024 7:31 AM EDT   
-----  
Regarding: FW:   
Gestational DM  
Please call pt and   
schedule with me or   
Andree Morales RD for   
virtual GDM  
education appt.  
I have openings for GDM   
 at 10am or 11am  
Andree has an opening   
6/10 at 8am  
Thanks,  
Kolby Montesinos RD  
----- Message -----  
From: Rachel Herrera  
Sent: 2024 2:48 PM   
EDT  
To: Diabetic Education   
[facility]  
Subject: FW: Gestational   
DM  
----- Message -----  
From: Perla Thomas  
Sent: 2024 2:28 PM   
EDT  
To: Jeanna Stanley RN;   
Cass Galo LPN; #  
Subject: RE: Gestational   
DM  
We have a Diabetes   
Educator who is currently   
out of office. We do not   
have an  
Endo Registered Dietician   
in [place]. They have   
General Nutritionists.   
I'm not  
sure what you're looking   
for exactly but Jeanna is   
our Diabetes Educator who  
again is currently out on   
leave. This would need   
routed to [facility].  
Thank you in advance.  
----- Message -----  
From: Cass Galo LPN  
Sent: 2024 1:36 PM   
EDT  
To: Perla Thomas  
Subject: FW: Gestational   
DM  
----- Message -----  
From: Rachel Herrera  
Sent: 2024 12:57 PM   
EDT  
To: [facility] Endo Nurse Pool  
Subject: FW: Gestational   
DM  
Our RD at [facility] is   
requesting this patient   
be seen by an Endo RD due   
to GDM. I  
didn't want to   
incorrectly place them on   
your schedule. Can you   
please assist  
patient with a VV for   
GDM? It will take the   
place of the appointment   
tomorrow  
2024 with ANUJA Tony  
----- Message -----  
From: Keely Tony RD  
Sent: 2024 12:37 PM   
EDT  
To: Dilcia Neil   
Subject: Gestational DM  
Can you please reschedule   
this patient with Endo RD   
due to gestational DM?  
Looks like the [place]   
endo RD has an opening   
tomorrow morning. Renetta Orellana   
2024 7:46 AM Signed  
1st attempt  
Sent mc to call office.  
Tomy Butler PAC  
Allergies As of Date:   
2024  
(No Known Allergies)  
Date Reviewed: 2024  
Reviewed by: Keely Tony, RD - Fully   
Assessed  
Prescriptions as of   
2024  
- Lancets  
Use as directed to check   
glucose levels up to   
seven times daily.  
- alcohol swabs (ALCOHOL   
PREP PADS)  
Use as directed to check   
glucose levels up to   
seven times daily.  
- Blood-Glucose Meter   
(FREESTYLE FREEDOM)   
monitoring kit  
1 Each as needed.  
- blood sugar diagnostic   
(FREESTYLE TEST) test   
strip  
Use with blood glucose   
test four times a day.  
- ARIPiprazole (ABILIFY)   
2 mg tablet  
Take 2 mg by mouth once   
daily. 1mg for 7 days   
then 2mg  
- sertraline (ZOLOFT) 100   
mg tablet  
Take 1 tablet by mouth   
once daily.  
- omeprazole (PRILOSEC)   
20 mg capsule  
Take 1 capsule by mouth   
two times a day.  
- BABY ASPIRIN ORAL  
Take 81 mg by mouth once   
daily.  
- prenatal 21/iron   
fu/folic acid (PRENATAL   
COMPLETE ORAL)  
Take by mouth.  
Problem List As Of Date   
2024 Noted Resolved  
Depressive disorder   
[F32.A] 2024  
BMI 35.0-35.9,adult   
[Z68.35] 2024  
Rh negative state in   
antepartum period   
[O26.899*02/15/2024  
Obesity affecting   
pregnancy in second   
trimester*2024  
Encounter for supervision   
of normal first   
pregn*2024  
Heartburn during   
pregnancy in second   
trimester *2024  
Diet controlled   
gestational diabetes   
mellitus (*2024  
Anemia during pregnancy   
in third trimester   
[O99*2024  
Encounter Number:   
[id number]  
Encounter Status:Closed   
by RENETTA BUTLER on   
24              Normal                                  [facility]  
   
                                                    Sravan 2024   
   
                                        CNPN                Telephone (OBGYWM)  
-------------------------  
-----  
KARINA LINDSAY ([id number])   
1990 F  
Date Time Provider   
Department  
24 BOBBI JALLOH   
OBHUDSONWKISHORE  
During your visit today,   
we recorded the following   
information about you:  
Bobbi Jalloh APRN.CNP   
2024 7:12 AM Signed  
Please notify patient:  
Abnormal 3 hour,   
consistent with GDM.  
Supplies ordered.  
Nutrition consult and   
diabetes consult placed.  
Growth ultrasounds every   
4 weeks at diagnosis   
ordered. Please assist in  
scheduling.  
Patient to bring glucose   
logs to next appointment.   
Please review timing of  
sugars and parameters.  
FREDY Beebe.Maisha Adhikari RN   
2024 9:07 AM Signed  
Patient notified and   
voiced understanding of   
results. Nutrition and   
diabetic  
education appointments   
scheduled. Growth   
ultrasound at 32 weeks   
scheduled.  
Maisha Jarrett RN  
Allergies As of Date:   
2024  
(No Known Allergies)  
Date Reviewed: 2024  
Reviewed by: Tobin Florez MA - Fully   
Assessed  
Reason for Visit:  
Results [95]  
Primary Visit   
Diagnosis:Gestational   
diabetes mellitus (GDM)   
in third trimester,  
gestational diabetes   
method of control   
unspecified  
[O24.419]  
Other Visit   
Diagnosis:Diet controlled   
gestational diabetes   
mellitus (GDM) in  
third trimester [O24.410]  
Order(s):LancetsUse as   
directed to check glucose   
levels up to seven times  
daily.Disp: 200 EachRfl:   
8  
alcohol swabs (ALCOHOL   
PREP PADS)Use as directed   
to check glucose  
levels up to seven times   
daily.Disp: 200 EachRfl:   
8  
OBSTETRIC ULTRASOUND I   
[6023353] Order #:   
[id number]: 1 STANDING  
Blood-Glucose Meter   
(FREESTYLE FREEDOM)   
monitoring kit1 Each as  
needed.Disp: 1 KitRfl: 0  
CONSULT TO DIABETES   
EDUCATION DSME/MNT   
[2115891] Order #:  
[id number]: 4 FUTURE  
CONSULT TO NUTRITION   
THERAPY [9020] Order #:   
[id number]: 4 FUTURE  
blood sugar diagnostic   
(FREESTYLE TEST) test   
stripUse with blood  
glucose test four times a   
day.Disp: 300 StripRfl: 3  
Prescriptions as of   
2024  
- Lancets  
Use as directed to check   
glucose levels up to   
seven times daily.  
- alcohol swabs (ALCOHOL   
PREP PADS)  
Use as directed to check   
glucose levels up to   
seven times daily.  
- Blood-Glucose Meter   
(FREESTYLE FREEDOM)   
monitoring kit  
1 Each as needed.  
- blood sugar diagnostic   
(FREESTYLE TEST) test   
strip  
Use with blood glucose   
test four times a day.  
- ARIPiprazole (ABILIFY)   
2 mg tablet  
Take 2 mg by mouth once   
daily. 1mg for 7 days   
then 2mg  
- sertraline (ZOLOFT) 100   
mg tablet  
Take 1 tablet by mouth   
once daily.  
- omeprazole (PRILOSEC)   
20 mg capsule  
Take 1 capsule by mouth   
two times a day.  
- BABY ASPIRIN ORAL  
Take 81 mg by mouth once   
daily.  
- prenatal 21/iron   
fu/folic acid (PRENATAL   
COMPLETE ORAL)  
Take by mouth.  
Problem List As Of Date   
2024 Noted Resolved  
Depressive disorder   
[F32.A] 2024  
BMI 35.0-35.9,adult   
[Z68.35] 2024  
Rh negative state in   
antepartum period   
[O26.899*02/15/2024  
Obesity affecting   
pregnancy in second   
trimester*2024  
Encounter for supervision   
of normal first   
pregn*2024  
Heartburn during   
pregnancy in second   
trimester *2024  
Diet controlled   
gestational diabetes   
mellitus (*2024  
Anemia during pregnancy   
in third trimester   
[O99*2024  
Prescriptions ordered   
this encounter Disp   
Refills Start End  
BLOOD SUGAR DIAGNOSTIC   
STRIPS 200 * 8 2024   
2024  
Cmt: Per Insurance   
Coverage  
Sig: Use as directed to   
check glucose levels up   
to seven times daily.  
LANCETS 200 * 8   
2024  
Cmt: Per Insurance   
Coverage  
Sig: Use as directed to   
check glucose levels up   
to seven times daily.  
ALCOHOL SWABS 200 * 8   
2024  
Cmt: Per Insurance   
Coverage  
Sig: Use as directed to   
check glucose levels up   
to seven times daily.  
BLOOD-GLUCOSE METER KIT 1   
Kit 0 2024  
Route: [facility]  
Si Each as needed.  
BLOOD SUGAR DIAGNOSTIC   
STRIPS 300 * 3 2024  
Sig: Use with blood   
glucose test four times a   
day.  
Medications Discontinued   
During This Encounter  
Prescriptions  
- blood sugar diagnostic   
test strip (Discontinued)  
Use as directed to check   
glucose levels up to   
seven times daily.  
Encounter Number:   
[id number]  
Encounter Status:Closed   
by MAISHA JARRETT on   
24              Normal                                  [facility]  
   
                                                    GLUCOSE GESTATIONAL, 1 HOURo  
n 2024   
   
                                                    Glucose 1 Hr post   
Unsp challenge   
[Mass/Vol]      238 mg/dL       High                      [facility]  
   
                                        Comment on above:   Order Comment: Speci  
men Type: BLOOD SPECIMENOrdering Facility:  
   
[facility] Address: [address] [place] [address]   
   
                                                            Result Comment: Amer  
ican Congress of Obstetricians and   
Gynecologists (Fabiana/Nela) guidelines state gestational   
diabetes mellitus is present when 2 or more of the plasma glucose   
concentrations meet or exceed the following levels: fastin   
mg/dl, 1 hr: 180 mg/dl, 2 hr: 155 mg/dl, and 3 hr: 140 mg/dl.   
   
                                                            Performed By: #### G  
TGST1 ####[facility]   
[id number] [place] [address] [place]   
   
                                                    GLUCOSE GESTATIONAL, 2 HOURo  
n 2024   
   
                                                    Glucose 2 Hr post   
Unsp challenge   
[Mass/Vol]      170 mg/dL       High                      [facility]  
   
                                        Comment on above:   Order Comment: Speci  
men Type: BLOOD SPECIMENOrdering Facility:  
   
[facility] Address: [address] [place] [address]   
   
                                                            Result Comment: [facility] of Obstetricians and   
Gynecologists (Jung/Coustan) guidelines state gestational   
diabetes mellitus is present when 2 or more of the plasma glucose   
concentrations meet or exceed the following levels: fastin   
mg/dl, 1 hr: 180 mg/dl, 2 hr: 155 mg/dl, and 3 hr: 140 mg/dl.   
   
                                                            Performed By: #### G  
TGST2 ####[facility]   
[id number] [place] [address] [place]   
   
                                                    GLUCOSE GESTATIONAL, 3 HOURo  
n 2024   
   
                                                    Glucose 3 Hr post   
Unsp challenge   
[Mass/Vol]      82 mg/dL        Normal                    [facility]  
   
                                        Comment on above:   Order Comment: Speci  
men Type: BLOOD SPECIMENOrdering Facility:  
   
[facility] Address: [address] [place] [address]   
   
                                                            Result Comment: Amer  
ican Congress of Obstetricians and   
Gynecologists ([place]) guidelines state gestational   
diabetes mellitus is present when 2 or more of the plasma glucose   
concentrations meet or exceed the following levels: fastin   
mg/dl, 1 hr: 180 mg/dl, 2 hr: 155 mg/dl, and 3 hr: 140 mg/dl.   
   
                                                            Performed By: #### G  
TGST3 ####[facility]   
[id number] [address] [place]   
[place]   
   
                                                    GLUCOSE GESTATIONAL, FASTING  
on 2024   
   
                                                    Glucose post fast   
[Mass/Vol]      101 mg/dL       High            74-94           [facility]  
   
                                        Comment on above:   Order Comment: Speci  
men Type: BLOOD SPECIMENOrdering Facility:  
   
[facility] Address: [address] [place] [address]   
   
                                                            Result Comment: Amer  
ican Congress of Obstetricians and   
Gynecologists ([place]) guidelines state gestational   
diabetes mellitus is present when 2 or more of the plasma glucose   
concentrations meet or exceed the following levels: fastin   
mg/dl, 1 hr: 180 mg/dl, 2 hr: 155 mg/dl, and 3 hr: 140 mg/dl.   
   
                                                            Performed By: #### G  
TGSTF ####[facility]   
[id number] [place] [address] [facility]   
[facility]   
   
                                                    Sravan 2024   
   
                                        CNPN                Telephone (OBGYWM)  
-------------------------  
-----  
KARINA LINDSAY ([id number])   
1990 F  
Date Time Provider   
Department  
24 BOBBI JALLOH   
OBGYWM  
During your visit today,   
we recorded the following   
information about you:  
Allergies As of Date:   
2024  
(No Known Allergies)  
Date Reviewed: 2024  
Reviewed by: Tobin Florez MA - Fully   
Assessed  
Reason for Visit:  
Results [95]  
Primary Visit   
Diagnosis:Elevated   
glucose tolerance test   
[R73.09]  
Other Visit   
Diagnosis:Anemia during   
pregnancy in third   
trimester [O99.013]  
Order(s):GEST GLUC MAX,   
3-HR, 100 GM, FASTING   
[SQGTGST3] Order #:   
[id number]  
FUTURE  
Prescriptions as of   
2024  
- ARIPiprazole (ABILIFY)   
2 mg tablet  
Take 2 mg by mouth once   
daily. 1mg for 7 days   
then 2mg  
- sertraline (ZOLOFT) 100   
mg tablet  
Take 1 tablet by mouth   
once daily.  
- omeprazole (PRILOSEC)   
20 mg capsule  
Take 1 capsule by mouth   
two times a day.  
- BABY ASPIRIN ORAL  
Take 81 mg by mouth once   
daily.  
- prenatal 21/iron   
fu/folic acid (PRENATAL   
COMPLETE ORAL)  
Take by mouth.  
Problem List As Of Date   
2024 Noted Resolved  
Depressive disorder   
[F32.A] 2024  
BMI 35.0-35.9,adult   
[Z68.35] 2024  
Rh negative state in   
antepartum period   
[O26.899*02/15/2024  
Obesity affecting   
pregnancy in second   
trimester*2024  
Encounter for supervision   
of normal first   
pregn*2024  
Heartburn during   
pregnancy in second   
trimester *2024  
Elevated glucose   
tolerance test [R73.09]   
2024  
Anemia during pregnancy   
in third trimester   
[O99*2024  
Encounter Number:   
[id number]  
Encounter Status:Closed   
by NATASHA BURLESON on   
24             Normal                                  [facility]  
   
                                                    CBC W Auto Differential pane  
l (Bld)on 2024   
   
                                                    Basophils (Bld)   
[#/Vol]         0.05 10*3/uL    Normal          <0.11           [facility]  
   
                                        Comment on above:   Order Comment: Speci  
men Type: BLOOD SPECIMENOrdering Facility:  
   
[facility] Address: [address] [place] [address]   
   
                                                            Performed By: #### 5  
7021-8 ####[facility]   
MILLWNCLIA [id number] [place] [address]   
[place]   
   
                                                    Basophils/100 WBC   
(Bld)           0.4 %           Normal                          [facility]  
   
                                        Comment on above:   Order Comment: Speci  
men Type: BLOOD SPECIMENOrdering Facility:  
   
[facility] Address: [address] [place] [address]   
   
                                                            Performed By: #### 5  
7021-8 ####[facility] [id number] [place] [address]   
[place]   
   
                                                    Differential cell   
count method Nom   
(Bld)           Auto            Normal                          [facility]  
   
                                        Comment on above:   Order Comment: Speci  
men Type: BLOOD SPECIMENOrdering Facility:  
   
[facility] Address: [address] [place] [address]   
   
                                                            Performed By: #### 5  
7021-8 ####[facility]   
MILLWNCLIA [id number] [place] [address]   
[place]   
   
                                                    Eosinophils (Bld)   
[#/Vol]         0.16 10*3/uL    Normal          <0.46           [facility]  
   
                                        Comment on above:   Order Comment: Speci  
men Type: BLOOD SPECIMENOrdering Facility:  
   
[facility] Address: [address] [place] [address]   
   
                                                            Performed By: #### 5  
7021-8 ####[facility]   
MILLWNCLIA [id number] [place] [address]   
[place]   
   
                                                    Eosinophils/100   
WBC (Bld)       1.3 %           Normal                          [facility]  
   
                                        Comment on above:   Order Comment: Speci  
men Type: BLOOD SPECIMENOrdering Facility:  
   
[facility] Address: [address] [place] [address]   
   
                                                            Performed By: #### 5  
7021-8 ####[facility] HospitalNCA [id number] [place] [address]   
[place]   
   
                                                    Erythrocyte   
distribution width   
(RBC) [Ratio]   14.8 %          Normal          11.5-15.0       [facility]  
   
                                        Comment on above:   Order Comment: Speci  
men Type: BLOOD SPECIMENOrdering Facility:  
   
[facility] Address: [address] [place] [address]   
   
                                                            Performed By: #### 5  
7021-8 ####[facility] HospitalNC[facility] [id number] [place] [address]   
[place]   
   
                                                    Hematocrit (Bld)   
[Volume fraction] 31.0 %          Low             36.0-46.0       [facility]  
   
                                        Comment on above:   Order Comment: Speci  
men Type: BLOOD SPECIMENOrdering Facility:  
  
[facility] Address: [address] [place] [address]   
   
                                                            Performed By: #### 5  
7021-8 ####[facility] HospitalNCLIA [id number] [place] [address]   
[place]   
   
                                                    Hemoglobin (Bld)   
[Mass/Vol]      10.4 g/dL       Low             11.5-15.5       [facility]  
   
                                        Comment on above:   Order Comment: Speci  
men Type: BLOOD SPECIMENOrdering Facility:  
   
[facility] Address: [address] [place] [address]   
   
                                                            Performed By: #### 5  
7021-8 ####[facility] HospitalNCLIA [id number] [place] [address]   
[place]   
   
                                                    Immature   
granulocytes (Bld)   
[#/Vol]         0.20 10*3/uL    High            <0.10           [facility]  
   
                                        Comment on above:   Order Comment: Speci  
men Type: BLOOD SPECIMENOrdering Facility:  
   
[facility] Address: [address] [place] [address]   
   
                                                            Performed By: #### 5  
7021-8 ####[facility]   
SPENSERGraceJEN [id number] [place] [address]   
[place]   
   
                                                    Immature   
granulocytes/100   
WBC (Bld)       1.6 %           Normal                          [facility]  
   
                                        Comment on above:   Order Comment: Speci  
men Type: BLOOD SPECIMENOrdering Facility:  
   
[facility] Address: [address] [place] [address]   
   
                                                            Performed By: #### 5  
7021-8 ####[facility] [id number] [place] [address]   
[place]   
   
                                                    Lymphocytes (Bld)   
[#/Vol]         1.97 10*3/uL    Normal          1.00-4.00       [facility]  
   
                                        Comment on above:   Order Comment: Speci  
men Type: BLOOD SPECIMENOrdering Facility:  
   
[facility] Address: [address] [place] [address]   
   
                                                            Performed By: #### 5  
7021-8 ####[facility] [id number] [place] [address]   
[place]   
   
                                                    Lymphocytes/100   
WBC (Bld)       15.5 %          Normal                          [facility]  
   
                                        Comment on above:   Order Comment: Speci  
men Type: BLOOD SPECIMENOrdering Facility:  
   
[facility] Address: [address] [place] [address]   
   
                                                            Performed By: #### 5  
7021-8 ####[facility] [id number] [place] [address]   
[place]   
   
                                                    MCH (RBC) [Entitic   
mass]           31.0 pg         Normal          26.0-34.0       [facility]  
   
                                        Comment on above:   Order Comment: Speci  
men Type: BLOOD SPECIMENOrdering Facility:  
  
[facility] Address: [address] [place] [address]   
   
                                                            Performed By: #### 5  
7021-8 ####[facility] HospitalNCLIA [id number] [place] [address]   
[place]   
   
                                                    MCHC (RBC)   
[Mass/Vol]      33.5 g/dL       Normal          30.5-36.0       [facility]  
   
                                        Comment on above:   Order Comment: Speci  
men Type: BLOOD SPECIMENOrdering Facility:  
   
[facility] Address: [address] [place] [address]   
   
                                                            Performed By: #### 5  
7021-8 ####[facility] HospitalILEANAA [id number] [place] [address]   
[place]   
   
                                                    MCV (RBC) [Entitic   
vol]            92.5 fL         Normal          80.0-100.0      [facility]  
   
                                        Comment on above:   Order Comment: Speci  
men Type: BLOOD SPECIMENOrdering Facility:  
  
[facility] Address: [address] [place] [address]   
   
                                                            Performed By: #### 5  
7021-8 ####[facility] [id number] [place] [address]   
[place]   
   
                                                    Monocytes (Bld)   
[#/Vol]         0.71 10*3/uL    Normal          <0.87           [facility]  
   
                                        Comment on above:   Order Comment: Speci  
men Type: BLOOD SPECIMENOrdering Facility:  
   
[facility] Address: [address] [place] [address]   
   
                                                            Performed By: #### 5  
7021-8 ####[facility] HospitalLIA [id number] [address] [place] [facility]   
   
                                                    Monocytes/100 WBC   
(Bld)           5.6 %           Normal                          [facility]  
   
                                        Comment on above:   Order Comment: Speci  
men Type: BLOOD SPECIMENOrdering Facility:  
   
[facility] Address: [address] [place] [address]   
   
                                                            Performed By: #### 5  
7021-8 ####[facility] HospitalNCLIA [id number] [place] [address]   
[place]   
   
                                                    Neutrophils (Bld)   
[#/Vol]         9.59 10*3/uL    High            1.45-7.50       [facility]  
   
                                        Comment on above:   Order Comment: Speci  
men Type: BLOOD SPECIMENOrdering Facility:  
   
[facility] Address: [address] [place] [address]   
   
                                                            Performed By: #### 5  
7021-8 ####[facility] EastA [id number] [place] [address]   
[place]   
   
                                                    Neutrophils/100   
WBC (Bld)       75.6 %          Normal                          [facility]  
   
                                        Comment on above:   Order Comment: Speci  
men Type: BLOOD SPECIMENOrdering Facility:  
   
[facility] Address: [address] [place] [address]   
   
                                                            Performed By: #### 5  
7021-8 ####[facility] HospitalNC[facility] [id number] [place] [address]   
[place]   
   
                                                    Nucleated RBC   
(Bld) [#/Vol]   10*3/uL         Normal          <0.01           [facility]  
   
                                        Comment on above:   Order Comment: Speci  
men Type: BLOOD SPECIMENOrdering Facility:  
   
[facility] Address: [address] [place] [address]   
   
                                                            Performed By: #### 5  
7021-8 ####[facility] [id number] [place] [address]   
[place]   
   
                                                    Nucleated RBC/100   
WBC (Bld) [Ratio] 0.0 /100 WBC    Normal                          [facility]  
   
                                        Comment on above:   Order Comment: Speci  
men Type: BLOOD SPECIMENOrdering Facility:  
   
[facility] Address: [address] [place] [address]   
   
                                                            Performed By: #### 5  
7021-8 ####[facility] HospitalNCA [id number] [place] [address]   
[place]   
   
                                                    Platelet mean   
volume (Bld)   
[Entitic vol]   9.8 fL          Normal          9.0-12.7        [facility]  
   
                                        Comment on above:   Order Comment: Speci  
men Type: BLOOD SPECIMENOrdering Facility:  
  
[facility] Address: [address] [place] [address]   
   
                                                            Performed By: #### 5  
7021-8 ####[facility]   
CHLOENCLIA [id number] [place] [address]   
[place]   
   
                                                    Platelets (Bld)   
[#/Vol]         292 10*3/uL     Normal          150-400         [facility]  
   
                                        Comment on above:   Order Comment: Speci  
men Type: BLOOD SPECIMENOrdering Facility:  
   
[facility] Address: [address] [place] [address]   
   
                                                            Performed By: #### 5  
7021-8 ####[facility] HospitalNCLIA [id number] [place] [address]   
[place]   
   
                      RBC (Bld) [#/Vol] 3.35 10*6/uL Low        3.90-5.20  [facility]  
   
                                        Comment on above:   Order Comment: Speci  
men Type: BLOOD SPECIMENOrdering Facility:  
   
[facility] Address: [address] [place] [address]   
   
                                                            Performed By: #### 5  
7021-8 ####[facility] HospitalNCLIA [id number] [place] [address]   
[place]   
   
                      WBC (Bld) [#/Vol] 12.68 10*3/uL High       3.70-11.00 [facility]  
   
                                        Comment on above:   Order Comment: Speci  
men Type: BLOOD SPECIMENOrdering Facility:  
   
[facility] Address: [address] [place] [address]   
   
                                                            Performed By: #### 5  
7021-8 ####[facility] HospitalNCLIA [id number] [place] [address]   
[place]   
   
                                                    GESTATIONAL GLUCOSE SCREEN,   
1-HOUR, 50 GRAM, NON-FASTINGon 2024   
   
                      Glucose [Mass/Vol] 141 mg/dL  High            [facility]  
   
                                        Comment on above:   Order Comment: Speci  
men Type: BLOOD SPECIMENOrdering Facility:  
   
[facility] Address: [address] [place], [place] [address]   
   
                                                            Result Comment: Amer  
[facility] Congress of Obstetricians and   
Gynecologists (Fabiana/Nela) guidelines state a gestational   
diabetes mellitus positive screen is made, in women not previously   
diagnosed with overt diabetes, when the 1 hr plasma glucose level   
is equal to or above 140 mg/dL. The [facility]   
[facility] recommends a 135 mg/dL cutoff.   
   
                                                            Performed By: #### G  
LTGST ####[facility]   
[id number] [place] [address] [place]   
   
                                                    Reagin and Treponema pallidu  
m IgG and IgM [Interp]on 2024   
   
                                                    T. pallidum   
IgG+IgM IA Ql (S) Non-Reactive    Normal          Nonreactive     [facility]  
   
                                        Comment on above:   Order Comment: Speci  
men Type: BLOOD SPECIMENOrdering Facility:  
   
[facility] Address: [address] [place] [address]   
   
                                                            Performed By: #### 7  
3752-8 ####[facility] LABCLIA   
[id number] [place] [id number] [place]   
   
                                                    Reagin+T pallidum IgG+IgM Se  
rPl-Impon 2024   
   
                                                    Reagin and   
Treponema pallidum   
IgG and IgM   
[Interp]                                Cannot exclude recent   
Treponemal infection if   
specimen collected within   
7-10 days after   
appearance of suspect   
lesions or 2-3 weeks   
after an exposure.   
Clinical correlation is   
required.           Normal                                  [facility]  
   
                                        Comment on above:   Order Comment: Speci  
men Type: BLOOD SPECIMENOrdering Facility:  
   
[facility] Address: [address] [place] [address]   
   
                                                            Performed By: #### 7  
3752-8 ####[facility] LABIA   
[id number] [place] [id number] [place]   
   
                                                    TYPE + SCREEN PRENATALon    
   
                      ABO        O          Normal                [facility]  
   
                                        Comment on above:   Order Comment: Speci  
men Type: BLOOD SPECIMEN  
Ordering Facility: [facility]  
Address: [address] [id number]   
   
                                                            Performed By: #### 5  
8410-2 ####  
[facility]  
CLIA [id number]  
[address] [place] [address] [place]   
   
                                                    HISTORICAL AB SCR   
STATUS          Negative        Normal                          [facility]  
   
                                        Comment on above:   Order Comment: Speci  
men Type: BLOOD SPECIMEN  
Ordering Facility: [facility]  
Address: [address] [place] [address]   
   
                                                            Performed By: #### 5  
8410-2 ####  
[facility]  
CLIA [id number]  
[address] [place] [address] [place]   
   
                      Rh Nom (Bld) Negative   Normal                [facility]  
   
                                        Comment on above:   Order Comment: Speci  
men Type: BLOOD SPECIMEN  
Ordering Facility: [facility]  
Address: [address] [place] [address]   
   
                                                            Performed By: #### 5  
8410-2 ####  
[facility]  
CLIA [id number]  
[address] [place] [address] OF [facility]   
   
                                                    TYPE AND SCREEN   
EXPIRATION      2024 23:59 Normal                          [facility]  
   
                                        Comment on above:   Order Comment: Speci  
men Type: BLOOD SPECIMEN  
Ordering Facility: [facility]  
Address: [address] [place] [address]   
   
                                                            Performed By: #### 5  
8410-2 ####  
[facility]  
CLIA [id number]  
[address] [place] [address] [place]   
   
                                                    Examination level ultrasound  
on 2024   
   
                                                                  [facility]  
   
                                                    TYPE + SCREEN PRENATALon    
   
                      ABO        O          Normal                [facility]  
   
                                        Comment on above:   Order Comment: Speci  
men Type: BLOOD SPECIMEN  
Ordering Facility: [facility]  
Address: [address] [place] [address]   
   
                                                            Performed By: #### 5  
8410-2 ####  
[facility]  
CLIA [id number]  
[address] [place] [address] [place]   
   
                                                    HISTORICAL AB SCR   
STATUS          Negative        Normal                          [facility]  
   
                                        Comment on above:   Order Comment: Speci  
men Type: BLOOD SPECIMEN  
Ordering Facility: [facility]  
Address: [address] [place] [address]   
   
                                                            Performed By: #### 5  
8410-2 ####  
[facility]  
CLIA [id number]  
[address] [place] [address]   
   
                      Rh Nom (Bld) Negative   Normal                [facility]  
   
                                        Comment on above:   Order Comment: Speci  
men Type: BLOOD SPECIMEN  
Ordering Facility: [facility]  
Address: [address] [place] [address]   
   
                                                            Performed By: #### 5  
8410-2 ####  
[facility]  
CLIA [id number]  
[address] [place] [address] OF [facility]   
   
                                                    TYPE AND SCREEN   
EXPIRATION      2024 23:59 Normal                          [facility]  
   
                                        Comment on above:   Order Comment: Speci  
men Type: BLOOD SPECIMEN  
Ordering Facility: [facility]  
Address: [address] [place] [address]   
   
                                                            Performed By: #### 5  
8410-2 ####  
[facility]  
CLIA [id number]  
[address] [place] [address] [place]   
   
                                                    CBC panel Auto (Bld)on    
   
                                                    Erythrocyte   
distribution width   
(RBC) [Ratio]   13.4 %          Normal          11.5-15.0       [facility]  
   
                                        Comment on above:   Order Comment: Speci  
men Type: BLOOD SPECIMEN  
Ordering Facility: [facility]  
Address: [address] [place] [address]   
   
                                                            Performed By: #### 5  
8410-2 ####  
[facility]  
CLIA [id number]  
[address] [place] [address]   
   
                                                    Hematocrit (Bld)   
[Volume fraction] 33.2 %          Low             36.0-46.0       [facility]  
   
                                        Comment on above:   Order Comment: Speci  
men Type: BLOOD SPECIMEN  
Ordering Facility: [facility]  
Address: [address] [place] [address]   
   
                                                            Performed By: #### 5  
8410-2 ####  
[facility]  
CLIA [id number]  
[address] [place] [address] [place]   
   
                                                    Hemoglobin (Bld)   
[Mass/Vol]      11.3 g/dL       Low             11.5-15.5       [facility]  
   
                                        Comment on above:   Order Comment: Speci  
men Type: BLOOD SPECIMEN  
Ordering Facility: [facility]  
Address: [address] [place] [address]   
   
                                                            Performed By: #### 5  
8410-2 ####  
[facility]  
CLIA [id number]  
[address] [place] [address] [place]   
   
                                                    MCH (RBC) [Entitic   
mass]           30.1 pg         Normal          26.0-34.0       [facility]  
   
                                        Comment on above:   Order Comment: Speci  
men Type: BLOOD SPECIMEN  
Ordering Facility: [facility]  
Address: [address] [place] [address]   
   
                                                            Performed By: #### 5  
8410-2 ####  
[facility]  
CLIA [id number]  
[address] [place] [address] [place]   
   
                                                    MCHC (RBC)   
[Mass/Vol]      34.0 g/dL       Normal          30.5-36.0       [facility]  
   
                                        Comment on above:   Order Comment: Speci  
men Type: BLOOD SPECIMEN  
Ordering Facility: [facility]  
Address: [address] [place] [address]   
   
                                                            Performed By: #### 5  
8410-2 ####  
[id number]  
[address] [place] [address] [place]   
   
                                                    MCV (RBC) [Entitic   
vol]            88.3 fL         Normal          80.0-100.0      [facility]  
   
                                        Comment on above:   Order Comment: Speci  
men Type: BLOOD SPECIMEN  
Ordering Facility: [facility]  
Address: [address] [place] [address]   
   
                                                            Performed By: #### 5  
8410-2 ####  
[facility] [id number]  
[address] [place] [address] [place]   
   
                                                    Nucleated RBC   
(Bld) [#/Vol]   10*3/uL         Normal          <0.01           [facility]  
   
                                        Comment on above:   Order Comment: Speci  
men Type: BLOOD SPECIMEN  
Ordering Facility: [facility]  
Address: [address] [place] [address]   
   
                                                            Performed By: #### 5  
8410-2 ####  
[facility]  
CLIA [id number]  
[address] [place] [address] [place]   
   
                                                    Platelet mean   
volume (Bld)   
[Entitic vol]   9.5 fL          Normal          9.0-12.7        [facility]  
   
                                        Comment on above:   Order Comment: Speci  
men Type: BLOOD SPECIMEN  
Ordering Facility: [facility]  
Address: [address] [place] [address]   
   
                                                            Performed By: #### 5  
8410-2 ####  
[facility]  
CLIA [id number]  
[address] [place] [address] [place]   
   
                                                    Platelets (Bld)   
[#/Vol]         298 10*3/uL     Normal          150-400         [facility]  
   
                                        Comment on above:   Order Comment: Speci  
men Type: BLOOD SPECIMEN  
Ordering Facility: [facility]  
Address: [address] [place] [address]   
   
                                                            Performed By: #### 5  
8410-2 ####  
[facility]  
CLIA [id number]  
[address] [place] [address] [place]   
   
                      RBC (Bld) [#/Vol] 3.76 10*6/uL Low        3.90-5.20  [facility]  
   
                                        Comment on above:   Order Comment: Speci  
men Type: BLOOD SPECIMEN  
Ordering Facility: [facility]  
Address: [address] [place] [address]   
   
                                                            Performed By: #### 5  
8410-2 ####  
[facility]  
CLIA [id number]  
[address] [place] [address] [place]   
   
                      WBC (Bld) [#/Vol] 9.94 10*3/uL Normal     3.70-11.00 [facility]  
   
                                        Comment on above:   Order Comment: Speci  
men Type: BLOOD SPECIMEN  
Ordering Facility: [facility]  
Address: [address] [place] [address]   
   
                                                            Performed By: #### 5  
8410-2 ####  
[facility]  
CLIA [id number]  
[address] [place] [address] [place]   
   
                                                    Fetal nuchal translucency me  
asured by USon 2024   
   
                                                                  [facility]  
   
                                                    HBV surface Ag Ser Qlon    
   
                                                    HBV surface Ag Ql   
(S)             Negative        Normal          Negative        [facility]  
   
                                        Comment on above:   Order Comment: Speci  
men Type: BLOOD SPECIMENOrdering Facility:  
   
[facility] Address: [address] [place] [address]   
   
                                                            Performed By: #### 5  
195-3, 37387-2, 90018-8 ####[facility] LABCLIA [id number] [address] [place]   
   
                                                    HCV Ab Ser Qlon 2024   
   
                      HCV Ab Ql (S) Negative   Normal     Negative   [facility]  
   
                                        Comment on above:   Order Comment: Speci  
men Type: BLOOD SPECIMEN  
Ordering Facility: [facility]  
Address: [address] [place] [address]   
   
                                                            Result Comment: The   
result suggests no evidence of active infection   
with Hepatitis C virus. Should recent infection be suspected,   
repeat testing may be considered 4-6 weeks after this draw.   
   
                                                            Performed By: #### 1  
6128-1 ####  
[facility]  
CLIA [id number]  
[address] [place] [address] [place]   
   
                                                    HGB ELECTROPHORESIS (OUTSIDE  
 USE)on 2024   
   
                                                    Hemoglobin A (Bld)   
[Mass fraction] 97.5 %          Normal          96.2-98.0       [facility]  
   
                                        Comment on above:   Order Comment: Speci  
men Type: BLOOD SPECIMEN  
Ordering Facility: [facility]  
Address: [address] [place] [address]   
   
                                                            Performed By: #### 5  
8410-2 ####  
[facility]  
CLIA [id number]  
[address] [place] [address] [place]   
   
                                                    Hemoglobin A2   
(Bld) [Mass   
fraction]       2.5 %           Normal          2.0-3.1         [facility]  
   
                                        Comment on above:   Order Comment: Speci  
men Type: BLOOD SPECIMEN  
Ordering Facility: [facility]  
Address: [address] [place] [address]   
   
                                                            Performed By: #### 5  
8410-2 ####  
[facility]  
CLIA [id number]  
[address] [place] [address] [place]   
   
                                                    Hemoglobin Unsp   
Elph (Bld) [Mass   
fraction]                               No abnormal hemoglobin   
identified.               Normal                    No abnormal   
hemoglobin   
identified.                             [facility]  
   
                                        Comment on above:   Order Comment: Speci  
men Type: BLOOD SPECIMEN  
Ordering Facility: [facility]  
Address: [address] [place] [address]   
   
                                                            Performed By: #### 5  
8410-2 ####  
[facility]  
CLIA [id number]  
[address] [place] [address] [place]   
   
                                                    HIV 1+2 Ab IA Qlon   
4   
   
                                                    HIV 1 and 2 Ab   
IA.rapid Nom   
(S/P/Bld)                       Normal                          [facility]  
   
                                        Comment on above:   Order Comment: Speci  
men Type: BLOOD SPECIMENOrdering Facility:  
  
[facility] Address: [address] [place] [address]   
   
                                                            Result Comment: Test  
 not indicated.   
   
                                                            Performed By: #### 5  
195-3, 94631-2, 21373-7 ####[facility] LABCLIA [id number] [place] [address] [place]   
   
                                                    HIV 1+2 Ab+HIV1   
p24 Ag IA Ql    Non-Reactive    Normal          Nonreactive     [facility]  
   
                                        Comment on above:   Order Comment: Speci  
men Type: BLOOD SPECIMENOrdering Facility:  
  
[facility] Address: [address] [place] [address]   
   
                                                            Performed By: #### 5  
195-3, 63963-2, 42509-8 ####[facility] LABCLIA [id number] [place] [address] [place]   
   
                                                    HIV immunoassay   
testing algorithm   
interpretation   
(S/P/Bld) [Interp]                 Normal                          [facility]  
   
                                        Comment on above:   Order Comment: Speci  
men Type: BLOOD SPECIMENOrdering Facility:  
   
[facility] Address: [address] [place] [address]   
   
                                                            Result Comment: No e  
vidence of HIV-1 or HIV-2 infection. Should   
recent infection be suspected, repeat testing may be considered 2-3   
weeks after this draw.  
[place] Rev. Code 3701.243(E): This information has been disclosed to   
you from confidential records protected from disclosure by state   
law. ???You shall make no further disclosure of this information   
without the specific, written, and informed release of the   
individual to whom it pertains or as otherwise permitted by state   
law. A general authorization for the release of medical or other   
information is not sufficient for the purpose of the release of HIV   
test results or diagnoses.   
   
                                                            Performed By: #### 5  
195-3, 72366-8, 80847-0 ####[facility] LABCLIA [id number] [place] [address] [place]   
   
                                                    HbA1c (Bld)on 2024   
   
                                                    Average glucose   
Estimated from   
glycated   
hemoglobin (Bld)   
[Mass/Vol]      97 mg/dL        Normal                          [facility]  
   
                                        Comment on above:   Order Comment: Speci  
men Type: BLOOD SPECIMENOrdering Facility:  
   
[facility] Address: [address] [place] [address]   
   
                                                            Result Comment: eAG:  
 (Estimated average glucose) is a calculated   
value from HgbA1c and is representative of the average blood   
glucose level in the last 2-3 month period.   
   
                                                            Performed By: #### 5  
5454-3 ####[facility] LABCLIA   
[id number] [place] [address] [place]   
   
                                                    HbA1c (Bld) [Mass   
fraction]       5.0 %           Normal          4.3-5.6         [facility]  
   
                                        Comment on above:   Order Comment: Speci  
men Type: BLOOD SPECIMENOrdering Facility:  
  
[facility] Address: [address] [place] [address]   
   
                                                            Result Comment: Amer  
[place] Diabetes Association guidelines indicate   
that patients with HgbA1c in the range 5.7-6.4% are at increased   
risk for development of diabetes, and intervention by lifestyle   
modification may be beneficial. HgbA1c greater or equal to 6.5% is   
considered diagnostic of diabetes.   
   
                                                            Performed By: #### 5  
5454-3 ####[facility] LABCLIA   
[id number] [place] [address] [place]   
   
                                                    RUBELLA IGG ABon 2024   
   
                                                    RUBELLA IGG AB,   
QUAL            Positive        Normal          Positive        [facility]  
   
                                        Comment on above:   Order Comment: Speci  
men Type: BLOOD SPECIMEN  
Ordering Facility: [facility]  
Address: [address] [place] [address]   
   
                                                            Result Comment: The   
result suggests recent or past exposure to   
Rubella virus or history of Rubella vaccination. Positive result   
may also be seen due to presence of passively-transferred   
antibodies. Please correlate with patient's history.   
   
                                                            Performed By: #### 5  
8410-2 ####  
[facility]  
CLIA [id number]  
[address] [place] [address] [place]   
   
                                                    Reagin and Treponema pallidu  
m IgG and IgM [Interp]on 2024   
   
                                                    T. pallidum   
IgG+IgM IA Ql (S) Non-Reactive    Normal          Nonreactive     [facility]  
   
                                        Comment on above:   Order Comment: Speci  
men Type: BLOOD SPECIMENOrdering Facility:  
   
[facility] Address: [address] [place] [address]   
   
                                                            Performed By: #### 5  
195-3, 87710-1, 15914-3 ####[facility] LABIA [id number] [place] [address] [place]   
   
                                                    Reagin+T pallidum IgG+IgM Se  
rPl-Impon 2024   
   
                                                    Reagin and   
Treponema pallidum   
IgG and IgM   
[Interp]                                Cannot exclude recent   
Treponemal infection if   
specimen collected within   
7-10 days after   
appearance of suspect   
lesions or 2-3 weeks   
after an exposure.   
Clinical correlation is   
required.           Normal                                  [facility]  
   
                                        Comment on above:   Order Comment: Zuly  
men Type: BLOOD SPECIMENOrdering Facility:  
   
[facility] Address: [address], [place]   
[address]   
   
                                                            Performed By: #### 5  
195-3, 63929-3, 37585-3 ####[facility] LABCLIA [id number] JABARIANA [facility] [id number] [address] [place]   
   
                                                    TYPE + SCREEN PRENATALon    
   
                      ABO        O          Normal                [facility]  
   
                                        Comment on above:   Order Comment: Speci  
men Type: BLOOD SPECIMEN  
Ordering Facility: [facility]  
Address: [address] [place] [address]   
   
                                                            Performed By: #### 5  
8410-2 ####  
[facility]  
CLIA [id number]  
[address] [place] [address] [place]   
   
                                                    HISTORICAL AB SCR   
STATUS          Negative        Normal                          [facility]  
   
                                        Comment on above:   Order Comment: Speci  
men Type: BLOOD SPECIMEN  
Ordering Facility: [facility]  
Address: [address] [place] [address]   
   
                                                            Performed By: #### 5  
8410-2 ####  
[facility]  
CLIA [id number]  
[address] [place] [address] [place]   
   
                      Rh Nom (Bld) Negative   Normal                [facility]  
   
                                        Comment on above:   Order Comment: Speci  
men Type: BLOOD SPECIMEN  
Ordering Facility: [facility]  
Address: [address] [place] [address]   
   
                                                            Performed By: #### 5  
8410-2 ####  
[facility]  
CLIA [id number]  
[address] [place] [address] [place]   
   
                                                    TYPE AND SCREEN   
EXPIRATION      02/15/2024 23:59 Normal                          [facility]  
   
                                        Comment on above:   Order Comment: Speci  
men Type: BLOOD SPECIMEN  
Ordering Facility: [facility]  
Address: [address] [place] [address]   
   
                                                            Performed By: #### 5  
8410-2 ####  
[facility]  
CLIA [id number]  
[address] [place] [address] [place]   
   
                                                    URINE OB DIP B/Oon   
4   
   
                      Glucose Ql (U) Negative              Neg mg/dL  [facility]  
   
                                                    Protein.monoclonal   
(U) [Mass/Vol]  Negative                        Neg mg/dL       [facility]  
   
                                                    Bacteria Ur Culton   
4   
   
                                                    Bacteria   
identified Cx Nom   
(U)                                     ORGANISM ID: 1  
<10,000 CFU/ml Normal   
urogenital miriam    Normal                                  [facility]  
   
                                        Comment on above:   Performed By: #### 6  
30-4 ####[facility]   
LABCLIA   
[id number] [address] OF [facility]   
   
                                                    C. trachomatis+N. gonorrhoea  
e DNA LEIA+probe Ql (Unsp spec)on 2024   
   
                                                    C. trachomatis   
rRNA LEIA+probe Ql   
(Unsp spec)         Negative            Normal              Negative for   
Chlamydia   
trachomatis by   
amplificaton                            [facility]  
   
                                        Comment on above:   Order Comment: Speci  
men Type: SWABOrdering Facility: [facility] Address: [address] [place] [address]   
   
                                                            Performed By: #### 3  
6902-5 ####[facility] LABCLIA   
[id number] [address] OF [facility]   
   
                                                    N. gonorrhoeae   
rRNA LEIA+probe Ql   
(Unsp spec)         Negative            Normal              Negative for   
Neisseria   
gonorrhoeae by   
amplification                           [facility]  
   
                                        Comment on above:   Order Comment: Speci  
men Type: SWABOrdering Facility: [facility] Address: [address] [place] [address]   
   
                                                            Performed By: #### 3  
6902-5 ####[facility] LABCLIA   
[id number] [place] [address] [place]   
   
                                                    Office Visit (Primary Care T  
xt/Forms)on 2023   
   
                                        Follow-up visit     Diagnoses/Problems  
Assessed  
Dyspepsia (536.8)   
(R10.13)  
Depression (311) (F32.A)  
Class 1 obesity with body   
mass index (BMI) of 34.0   
to 34.9 in adult   
(278.00,V85.34)  
(E66.9,Z68.34)  
Orders  
Dyspepsia  
Renew: Omeprazole 40 MG   
Oral Capsule Delayed   
Release; TAKE 1 CAPSULE   
Daily  
Provider Impressions  
Provider Impressions Free   
Text Note Form:  
Discussed decreasing fat   
intake, weight loss,   
avoiding recumbency for 3   
h postprandially,   
elevation of HOB and   
avoidance of chocolate,   
tomato based foods,   
alcohol, peppermint,   
caffeinated products,   
citrus fruits/drinks and   
onions.  
refilled lexapro 20mg   
daily  
increased prilosec to   
40mg daily; add Pepcid @   
HS if needed; to notify   
office if sx don't melonie  
  
Chief Complaint  
1 MO F/U REV LABS  
  
History of Present   
Illness  
Karina comes to the office   
for complaints of   
dyspepsia. Acid reflux   
has been occurring since   
last year. She believes   
it worsened when she   
gained 60# after breaking   
her leg 2Y ago. No   
frequent use   
NSAID/alcohol use.   
Dyspepsia worse @ HS.   
Bowels as per usual.   
Caffeine consumption:   
1-2C QAM. Energy drink:   
one daily. GERD better   
continues w/ fullness AND   
burping; epigastric   
burning better. No pepcid   
use. Nonsmoker. No   
trouble or painful   
swallowing.  
Reviewed labs w/ pt today  
Anxiety- better; less   
anxiety on daily basis   
Lexapro initially helped   
w/ depression and with   
anxiety. No panic   
attacks. Sleep: well.   
Anxious about random   
things 3-4d/w.  
Cervical CA Screening:   
PAP (recently completed   
23) UTD thru her OB   
GYN in [place]. Lansed R   
[place] gland  
  
Review of Systems  
Constitutional: not   
feeling tired, no fever   
and not feeling poorly.  
Cardiovascular: no chest   
pain and no palpitations.  
Respiratory: no cough.  
Gastrointestinal:   
abdominal pain and   
heartburn, but no nausea,   
no blood in stools, no   
dysphagia and no vomiting   
. epigastric pain.  
  
Active Problems  
Problems  
Bilateral pain of leg and   
foot (729.5)   
(M79.604,M79.605,M79.671,  
M79.672)  
Class 1 obesity with body   
mass index (BMI) of 33.0   
to 33.9 in adult   
(278.00,V85.33)  
(E66.9,Z68.33)  
Depression (311) (F32.A)  
Dyspepsia (536.8)   
(R10.13)  
Fatigue (780.79) (R53.83)  
Fracture, femur, distal   
(821.20) (S72.409A)  
Plant allergic contact   
dermatitis (692.6)   
(L23.7)  
Surgical History  
Problems  
History of Colonoscopy  
History of Tonsillectomy   
with adenoidectomy  
History of [place] tooth   
extraction  
Family History  
Mother  
Family history of   
Alzheimer's disease   
(V17.2) (Z82.0)  
Family history of   
diabetes mellitus (V18.0)   
(Z83.3)  
Father  
Family history of   
coronary artery disease   
(V17.3) (Z82.49)  
Grandparent  
Family history of   
Alzheimer's disease   
(V17.2) (Z82.0)  
Maternal Grandmother  
Family history of   
Alzheimer's disease   
(V17.2) (Z82.0)  
Social History  
Problems  
Denies alcohol   
consumption (V49.89)   
(Z78.9)  
Non-smoker (V49.89)   
(Z78.9)  
Current Meds  
  
Medication   
NameInstruction  
Escitalopram Oxalate 20   
MG Oral TabletTake 1   
tablet daily  
Omeprazole 20 MG Oral   
Capsule Delayed   
ReleaseTAKE 1 CAPSULE   
Daily  
Allergies  
Medication  
No Known Drug Allergies  
Vitals  
Vital Signs  
Recorded: 2023   
02:57PM  
PHQ-2 #1. Over the last 2   
weeks have you felt down,   
depressed or hopeless?   
(If yes, answer  
PHQ-9 below): No  
PHQ-2 #2. Over the last 2   
weeks have you felt   
little interest or   
pleasure in doing things?   
(If yes,  
answer PHQ-9 below): No  
Recorded: 2023   
02:50PM  
Heart Rate: 85  
Systolic: 106  
Diastolic: 70  
Height: 5 ft 6 in  
Weight: 212 lb 3 oz  
BMI Calculated: 34.25   
kg/m2  
BSA Calculated: 2.05  
O2 Saturation: 97  
Physical Exam  
Constitutional - Well   
developed, well   
nourished, well hydrated   
and no acute distress.   
Vital signs reviewed.  
Pulmonary - No grunting,   
flaring or retractions.   
No rales or wheezing.   
Good air exchange.  
Cardiovascular - Regular   
rate and rhythm. No   
significant murmur.  
Abdomen - Abnormal exam   
of abdomen. The abdomen   
was obese. Bowel sounds   
were normal. There was   
mild tenderness in the   
epigastric area. no   
masses palpated.  
  
Results/Data  
TSH - Thyroid Stimulating   
Hormone, [id number]   
09:36AMFarhad Angel  
Test   
NameResultFlagReference  
Thyroid Stimulating   
Hormone, Serum1.26   
mIU/LSee Below  
Reference Range: 0.44 -   
3.98 TSH testing is   
performed using different   
testing methodology at   
[facility]   
than at other [facility]   
hospitals. Direct result   
comparisons should only   
be made within the same   
method.  
Vitamin B12,   
[id number]   
09:36Farhad Busch  
Test   
NameResultFlagReference  
Vitamin B12, Lvbsc091   
pg/mL211 - 911  
Complete Blood   
[id number]   
09:36Farhad Busch  
Test   
NameResultFlagReference  
White Blood Cell Count6.2   
x10E9/L4.4 - 11.3  
Red Blood Cell Count4.22   
x10E12/LSee Below  
Reference Range: 4.00 -   
5.20  
Tcixgpziua98.6 g/dLSee   
Below  
Reference Range: 12.0 -   
16.0  
HCT38.2 %See Below  
Reference Range: 36.0 -   
46.0  
MCV91 fL80 - 100  
MCHC33.0 g/dLSee Below  
Reference Range: 32.0 -   
36.0  
Platelet Uxqvz872   
x10E9/L150 - 450  
RDW-CV12.3 %See Below  
Reference Range: 11.5 -   
(more content not   
included)...        Normal                                     
[facility]  
   
                                                    PHQ-2 VITALSon 2023   
   
                                                    Adult depression   
screening   
assessment      No                                              -[facility]  
Work Phone:   
[phone]  
   
                                                    CHAYO-WITH REFLEX TO ENAon    
   
                                                    CHAYO WITH REFLEX TO   
DIANN             Negative        Normal          NEGATIVE        [facility]  
   
                                        Comment on above:   Result Comment: The   
Antinuclear Antibody (CHAYO) test was   
performed   
using  
indirect immunofluorescence assay with HEp-2 cells slide.   
   
                                                            Performed By: #### A  
NA2 ####  
[facility]  
[address]  
[place] [address]   
   
                                                    C Reactive Protein, Serumon   
2023   
   
                      CRP [Mass/Vol] 0.32 mg/dL                       -[facility]  
Work Phone:   
[phone]  
   
                                        Comment on above:   REF VALUE< 1.00   
   
                                                    C-REACTIVE PROTEINon   
023   
   
                      C-REACTIVE PROTEIN 0.32 mg/dL Normal                [facility]  
   
                                        Comment on above:   Result Comment: REF   
VALUE  
< 1.00   
   
                                                            Performed By: #### C  
RP ####  
[address] [address]   
   
                                                    CBCon 2023   
   
                                                    Erythrocyte   
distribution width   
(RBC) [Ratio]   12.3 %          Normal          11.5 - 14.5     [facility]  
   
                                        Comment on above:   Performed By: #### C  
BC ####  
[address] [address]   
   
                                                    Hematocrit (Bld)   
[Volume fraction] 38.2 %          Normal          36.0 - 46.0     [facility]  
   
                                        Comment on above:   Performed By: #### C  
BC ####  
[address] [address]   
   
                                                    Hemoglobin (Bld)   
[Mass/Vol]      12.6 g/dL       Normal          12.0 - 16.0     [facility]  
   
                                        Comment on above:   Performed By: #### C  
BC ####  
[address] [address]   
   
                                                    MCHC (RBC)   
[Mass/Vol]      33.0 g/dL       Normal          32.0 - 36.0     [facility]  
   
                                        Comment on above:   Performed By: #### C  
BC ####  
[address] [address]   
   
                                                    MCV (RBC) [Entitic   
vol]            91 fL           Normal          80 - 100        [facility]  
   
                                        Comment on above:   Performed By: #### C  
BC ####  
[address] [address]   
   
                                                    Platelets (Bld)   
[#/Vol]         306 10*3/uL     Normal          150 - 450       [facility]  
   
                                        Comment on above:   Performed By: #### C  
BC ####  
[address] [address]   
   
                      RBC        4.22 x10E12/L Normal     4.00 - 5.20 [facility]  
   
                                        Comment on above:   Performed By: #### C  
BC ####  
[address] [address]   
   
                      WBC (Bld) [#/Vol] 6.2 10*3/uL Normal     4.4 - 11.3 [facility]  
   
                                        Comment on above:   Performed By: #### C  
BC ####  
[address] [address]   
   
                                                    CITRULLINE ANTIBODYon 2023   
   
                                                    CITRULLINE   
ANTIBODY        <1              Normal                          [facility]  
   
                                        Comment on above:   Result Comment: THE   
TEST FOR ANTIBODIES SPECIFIC FOR CYCLIC  
CITRULLINATED PEPTIDE (CCP) HAS SHOWN TO BE  
VALUABLE IN THE DIAGNOSIS OF RHEUMATOID  
ARTHRITIS. THE DIAGNOSTIC VALUE OF  
ANTIBODIES TO CCP IN JUVENILE RHEUMATOID  
ARTHRITIS PATIENTS HAS NOT BEEN DETERMINED.  
ANTIBODIES TO CENTROMERE OR SS-A AND MYELOMA  
IGG MAY BE REACTIVE IN THIS ASSAY.  
REF VALUES  
NEGATIVE < 3 U/ML  
POSITIVE >=3 U/ML   
   
                                                            Performed By: #### C  
ITAB ####  
[facility]  
[address] [place] [address]   
   
                                                    COMPREHENSIVE PANELon 2023   
   
                      Albumin [Mass/Vol] 4.2 g/dL   Normal     3.4 - 5.0  [facility]  
   
                                        Comment on above:   Performed By: #### C  
MP ####  
[address] [address]   
   
                                                    ALP [Catalytic   
activity/Vol]   59 U/L          Normal          33 - 110        [facility]  
   
                                        Comment on above:   Performed By: #### C  
MP ####  
[address] [address]   
   
                                                    ALT [Catalytic   
activity/Vol]   14 U/L          Normal          7 - 45          [facility]  
   
                                        Comment on above:   Result Comment: Linda  
ents treated with Sulfasalazine may   
generate  
falsely decreased results for ALT.   
   
                                                            Performed By: #### C  
MP ####  
[address] [address]   
   
                                                    Anion gap   
[Moles/Vol]     11 mmol/L       Normal          10 - 20         [facility]  
   
                                        Comment on above:   Performed By: #### C  
MP ####  
[address] [address]   
   
                                                    AST [Catalytic   
activity/Vol]   14 U/L          Normal          9 - 39          [facility]  
   
                                        Comment on above:   Performed By: #### C  
MP ####  
[address] [address]   
   
                                                    Bilirubin   
[Mass/Vol]      0.4 mg/dL       Normal          0.0 - 1.2       [facility]  
   
                                        Comment on above:   Performed By: #### C  
MP ####  
[address] [address]   
   
                      Calcium [Mass/Vol] 9.2 mg/dL  Normal     8.6 - 10.3 [facility]  
   
                                        Comment on above:   Performed By: #### C  
MP ####  
[address] [address]   
   
                                                    Chloride   
[Moles/Vol]     106 mmol/L      Normal          98 - 107        [facility]  
   
                                        Comment on above:   Performed By: #### C  
MP ####  
[address] [address]   
   
                                                    Creatinine   
[Mass/Vol]      0.72 mg/dL      Normal          0.50 - 1.05     [facility]  
   
                                        Comment on above:   Performed By: #### C  
MP ####  
[address] [address]   
   
                      eGFR FEMALE >90        Normal     >90        [facility]  
   
                                        Comment on above:   Result Comment: CALC  
ULATIONS OF ESTIMATED GFR ARE PERFORMED  
USING THE  CKD-EPI STUDY REFIT EQUATION  
WITHOUT THE RACE VARIABLE FOR THE IDMS-TRACEABLE  
CREATININE METHODS.  
https://jasn.asnjournals.org/content/early//ASN.138851271  
8   
   
                                                            Performed By: #### C  
MP ####  
[address] [address]   
   
                      Glucose [Mass/Vol] 82 mg/dL   Normal     74 - 99    [facility]  
   
                                        Comment on above:   Performed By: #### C  
MP ####  
[address] [address]   
   
                                                    HCO3 (Bld)   
[Moles/Vol]     26 mmol/L       Normal          21 - 32         [facility]  
   
                                        Comment on above:   Performed By: #### C  
MP ####  
[address] [address]   
   
                                                    Potassium   
[Moles/Vol]     4.0 mmol/L      Normal          3.5 - 5.3       [facility]  
   
                                        Comment on above:   Performed By: #### C  
MP ####  
[address] [address]   
   
                      Protein [Mass/Vol] 6.8 g/dL   Normal     6.4 - 8.2  [facility]  
   
                                        Comment on above:   Performed By: #### C  
MP ####  
[address] [address]   
   
                      Sodium [Moles/Vol] 139 mmol/L Normal     136 - 145  [facility]  
   
                                        Comment on above:   Performed By: #### C  
MP ####  
[address] [address]   
   
                                                    Urea nitrogen   
[Mass/Vol]      15 mg/dL        Normal          6 - 23          [facility]  
   
                                        Comment on above:   Performed By: #### C  
 ####  
[address] [address]   
   
                                                    Citrulline Antibodyon 2023   
   
                                                    Cyclic   
citrullinated   
peptide IgG Qn  <1                                              -[facility]  
Work Phone:   
[phone]  
  
   
                                        Comment on above:   THE TEST FOR ANTIBOD  
IES SPECIFIC FOR CYCLICCITRULLINATED   
PEPTIDE   
(CCP) HAS SHOWN TO BEVALUABLE IN THE DIAGNOSIS OF   
RHEUMATOIDARTHRITIS. THE DIAGNOSTIC VALUE OFANTIBODIES TO CCP IN   
JUVENILE RHEUMATOIDARTHRITIS PATIENTS HAS NOT BEEN   
DETERMINED.ANTIBODIES TO CENTROMERE OR SS-A AND MYELOMA IGG MAY BE   
REACTIVE IN THIS ASSAY. REF VALUES NEGATIVE < 3 U/ML POSITIVE >=3   
U/ML   
   
                                                    HEMOGLOBIN A1Con 2023   
   
                      Glucose [Mass/Vol] 97 mg/dL   Normal                [facility]  
   
                                        Comment on above:   Performed By: #### H  
BA1E ####  
[address] [address]   
   
                                                    HbA1c (Bld) [Mass   
fraction]       5.0 %           Normal                          [facility]  
   
                                        Comment on above:   Result Comment: Diag  
nosis of Diabetes-Adults  
Non-Diabetic: < or = 5.6%  
Increased risk for developing diabetes: 5.7-6.4%  
Diagnostic of diabetes: > or = 6.5%  
.  
Monitoring of Diabetes  
Age (y) Therapeutic Goal (%)  
Adults: >18 <7.0  
Pediatrics: 13-18 <7.5  
7-12 <8.0  
0- 6 7.5-8.5  
American Diabetes Association. Diabetes Care 33(S1), 2010.   
   
                                                            Performed By: #### H  
BA1E ####  
[address] [address]   
   
                                                    Hemoglobin A1Con 2023   
   
                      Glucose [Mass/Vol] 97 mg/dL                         -[facility]  
Work Phone:   
[phone]  
   
                                                    HbA1c (Bld) [Mass   
fraction]       5.0 %                                           -[facility]  
Work Phone:   
[phone]  
   
                                        Comment on above:   Diagnosis of Diabete  
s-Adults Non-Diabetic: < or = 5.6%   
Increased   
risk for developing diabetes: 5.7-6.4% Diagnostic of diabetes: > or   
= 6.5%. Monitoring of Diabetes Age (y) Therapeutic Goal (%) Adults:   
>18 <7.0 Pediatrics: 13-18 <7.5 7-12 <8.0 0- 6 7.5-8.5 American   
Diabetes Association. Diabetes Care 33(S1), 2010.   
   
                                                    Laboratory - Chemistry and C  
hemistry - challengeon 2023   
   
                                                    Albumin BCP dye   
[Mass/Vol]      4.2 g/dL                        3.4 - 5.0       [facility]   
[facility]  
Work Phone:   
[phone]  
21  
   
                                                    ALP [Catalytic   
activity/Vol]   59 U/L                          33 - 110        -[facility]   
[facility]  
Work Phone:   
[phone]  
21  
   
                                                    ALT With P-5'-P   
[Catalytic   
activity/Vol]   14 U/L                          7 - 45          [facility]   
[facility]  
Work Phone:   
[phone]  
  
   
                                        Comment on above:   Patients treated wit  
h Sulfasalazine may generate falsely   
decreased   
results for ALT.   
   
                                                    Anion gap   
[Moles/Vol]     11 mmol/L                       10 - 20         [facility]   
[facility]  
Work Phone:   
[phone]  
21  
   
                                                    AST With P-5'-P   
[Catalytic   
activity/Vol]   14 U/L                          9 - 39          [facility]   
[facility]  
Work Phone:   
[phone]  
  
   
                                                    Bilirubin   
[Mass/Vol]      0.4 mg/dL                       0.0 - 1.2       [facility]   
[facility]  
Work Phone:   
[phone]  
21  
   
                      Calcium [Mass/Vol] 9.2 mg/dL             8.6 - 10.3 -Med  
ical   
[facility]   
[facility]  
Work Phone:   
[phone]  
21  
   
                                                    Chloride   
[Moles/Vol]     106 mmol/L                      98 - 107        [facility]   
[facility]  
Work Phone:   
[phone]  
21  
   
                      CO2 [Moles/Vol] 26 mmol/L             21 - 32    -Medic  
l   
[facility]   
[facility]  
Work Phone:   
[phone]  
21  
   
                                                    Creatinine   
[Mass/Vol]      0.72 mg/dL                      See Below       [facility]   
[facility]  
Work Phone:   
[phone]  
  
   
                                        Comment on above:   Reference Range: 0.5  
0 - 1.05   
   
                      Glucose [Mass/Vol] 82 mg/dL              74 - 99    [facility]  
[facility]   
[facility]   
[facility]  
Work Phone:   
[phone]  
  
   
                                                    Potassium   
[Moles/Vol]     4.0 mmol/L                      3.5 - 5.3       [facility]   
[facility]   
[facility]  
Work Phone:   
[phone]  
  
   
                      Protein [Mass/Vol] 6.8 g/dL              6.4 - 8.2  [facility]   
Associ[facility]   
[facility]  
Work Phone:   
[phone]  
  
   
                      Sodium [Moles/Vol] 139 mmol/L            136 - 145  [facility]   
Associates   
[facility]  
Work Phone:   
[phone]  
  
   
                                                    Urea nitrogen   
[Mass/Vol]      15 mg/dL                        6 - 23          [facility]   
[facility]   
[facility]  
Work Phone:   
[phone]  
  
   
                                                    Laboratory - Hematology and   
Cell countson 2023   
   
                                                    Erythrocyte   
distribution width   
(RBC) [Ratio]   12.3 %                          See Below       [facility]  
Work Phone:   
[phone]  
  
   
                                        Comment on above:   Reference Range: 11.  
5 - 14.5   
   
                                                    Hematocrit (Bld)   
[Volume fraction] 38.2 %                          See Below       [facility]  
Work Phone:   
[phone]  
  
   
                                        Comment on above:   Reference Range: 36.  
0 - 46.0   
   
                                                    Hemoglobin (Bld)   
[Mass/Vol]      12.6 g/dL                       See Below       [facility]  
Work Phone:   
[phone]  
  
   
                                        Comment on above:   Reference Range: 12.  
0 - 16.0   
   
                                                    MCHC (RBC)   
[Mass/Vol]      33.0 g/dL                       See Below       [facility]  
Work Phone:   
[phone]  
  
   
                                        Comment on above:   Reference Range: 32.  
0 - 36.0   
   
                                                    MCV (RBC) [Entitic   
vol]            91 fL                           80 - 100        [facility]  
Work Phone:   
[phone]  
  
   
                                                    Platelets (Bld)   
[#/Vol]         306 10*3/uL                     150 - 450       [facility]  
Work Phone:   
[phone]  
  
   
                      RBC (Bld) [#/Vol] 4.22 {x10E12/L}            See Below  [facility]  
Work Phone:   
[phone]  
  
   
                                        Comment on above:   Reference Range: 4.0  
0 - 5.20   
   
                      WBC (Bld) [#/Vol] 6.2 10*3/uL            4.4 - 11.3 -[facility]  
Work Phone:   
[phone]  
   
                                                    Laboratory - Serology - non-  
microon 2023   
   
                                                    Nuclear Ab Hep2   
substrate Ql (S) Negative                        NEGATIVE        [facility]  
Work Phone:   
[phone]  
   
                                        Comment on above:   The Antinuclear Anti  
body (CHAYO) test was performed using   
indirect   
immunofluorescence assay with HEp-2 cells slide.   
   
                                                    No Panel Informationon    
   
                                 >90                   >90        [facility]  
Work Phone:   
[phone]  
   
                                        Comment on above:   CALCULATIONS OF HARRY  
MATED GFR ARE PERFORMED USING THE    
CKD-EPI   
STUDY REFIT EQUATION WITHOUT THE RACE VARIABLE FOR THE   
IDMS-TRACEABLE CREATININE   
METHODS.https://jasn.asnjournals.org/content/early/ASN.2  
091774328   
   
                                                    RHEUMATOID FACTORon 20  
23   
   
                      RHEUMATOID FACTOR <10        Normal     0 - 15     [facility]  
   
                                        Comment on above:   Performed By: #### R  
F ####  
[facility]  
[address] [place] [address]   
   
                                                    Rheumatoid Factor, Serum or   
Plasmaon 2023   
   
                                                    Rheumatoid factor   
Nephelometry Qn   
(S)             <10                             0 - 15          [facility]  
Work Phone:   
[phone]  
  
   
                                                    SEDIMENTATION RATE, ERYTHROC  
YTEon 2023   
   
                                                    SEDIMENTATION   
RATE, ERYTHROCYTE 12 mm/h         Normal          0 - 20          [facility]  
   
                                        Comment on above:   Performed By: #### E  
SRWS ####  
[facility]  
[address] [place] [address]   
   
                                                    Sedimentation Rate, Erythroc  
yteon 2023   
   
                                                    ESR (Bld)   
[Velocity]      12 mm/h                         0 - 20          [facility]  
Work Phone:   
[phone]  
   
                                                    TSHon 2023   
   
                      TSH Qn     1.26 m[IU]/L Normal     0.44 - 3.98 [facility]  
   
                                        Comment on above:   Result Comment: TSH   
testing is performed using different   
testing  
methodology at [facility] than at [facility]. Direct result comparisons should  
only be made within the same method.   
   
                                                            Performed By: #### T  
SH2 ####  
[address] [address]   
   
                                                    TSH - Thyroid Stimulating Ho  
rmone, Serumon 2023   
   
                      TSH Qn     1.26 m[IU]/L            See Below  [facility]  
Work Phone:   
[phone]  
   
                                        Comment on above:   Reference Range: 0.4  
4 - 3.98 TSH testing is performed using   
different testing methodology at [facility] than at   
[facility]. Direct result comparisons should only be   
made within the same method.   
   
                                                    URIC ACIDon 2023   
   
                      Urate [Mass/Vol] 5.1 mg/dL  Normal     2.3 - 6.7  [facility]  
   
                                        Comment on above:   Result Comment: Shala  
puncture immediately after or during the  
administration of Metamizole may lead to falsely  
low results. Testing should be performed immediately  
prior to Metamizole dosing.   
   
                                                            Performed By: #### V  
TB12 ####  
[address] [address]   
   
                                                    Uric Acid, Serumon   
3   
   
                      Urate [Mass/Vol] 5.1 mg/dL             2.3 - 6.7  [facility]  
Work Phone:   
[phone]  
   
                                        Comment on above:   Venipuncture immedia  
tely after or during the administration of  
   
Metamizole may lead to falsely low results. Testing should be   
performed immediately prior to Metamizole dosing.   
   
                                                    VITAMIN B12on 2023   
   
                                                    Cobalamin (Vitamin   
B12) [Mass/Vol] 454 pg/mL       Normal          211 - 911       [facility]  
   
                                        Comment on above:   Performed By: #### V  
TB12 ####  
[address] [address]   
   
                                                    Vitamin B12, Serumon   
023   
   
                                                    Cobalamin (Vitamin   
B12) [Mass/Vol] 454 pg/mL                       211 - 911       [facility]  
Work Phone:   
[phone]  
   
                                                    Office Visit (Primary Care T  
xt/Forms)on 2022   
   
                                        Follow-up visit     Diagnoses/Problems  
Assessed  
Depression (311) (F32.A)  
Class 1 obesity with body   
mass index (BMI) of 33.0   
to 33.9 in adult   
(278.00,V85.33)  
(E66.9,Z68.33)  
Fatigue (780.79) (R53.83)  
Bilateral pain of leg and   
foot (729.5)   
(M79.604,M79.605,M79.671,  
M79.672)  
Dyspepsia (536.8)   
(R10.13)  
Orders  
Bilateral pain of leg and   
foot  
CHAYO-WITH REFLEX TO DIANN;   
Status:Active; Requested   
for:83Cdu6865;  
C Reactive Protein,   
Serum; Status:Active;   
Requested for:77Vkg9668;  
Citrulline Antibody;   
Status:Active; Requested   
for:93Pjo8421;  
Rheumatoid Factor, Serum   
or Plasma; Status:Active;   
Requested for:04Gvj1803;  
Sedimentation Rate,   
Erythrocyte;   
Status:Active; Requested   
for:76Gdr2806;  
Uric Acid, Serum;   
Status:Active; Requested   
for:90Ruc9960;  
Class 1 obesity with body   
mass index (BMI) of 33.0   
to 33.9 in adult  
Hemoglobin A1C;   
Status:Active; Requested   
for:42Pkd2946;  
TSH - Thyroid Stimulating   
Hormone, Serum;   
Status:Active; Requested   
for:80Upf6428;  
Depression  
Start: Escitalopram   
Oxalate 20 MG Oral   
Tablet; Take 1 tablet   
daily  
Changed: From   
Escitalopram Oxalate 10   
MG Oral Tablet To   
Escitalopram Oxalate 10  
MG Oral Tablet Take 1   
tablet daily  
Dyspepsia  
Start: Omeprazole 20 MG   
Oral Capsule Delayed   
Release; TAKE 1 CAPSULE   
Daily  
Fatigue  
Complete Blood Count;   
Status:Active; Requested   
for:79Klx8037;  
Comprehensive Metabolic   
Panel; Status:Active;   
Requested for:07Vto3092;  
Vitamin B12, Serum;   
Status:Active; Requested   
for:64Bgp0410;  
Provider Impressions  
Provider Impressions Free   
Text Note Form:  
Increase Lexapro to 20 mg   
by mouth daily  
Pepcid continue daily   
twice daily if needed  
Omeprazole 20 mg by mouth   
daily  
Check RA   
panel/CMP/A1c/TSH/Vitamin   
B12  
RTC 1 month  
Discussed decreasing fat   
intake, weight loss,   
avoiding recumbency for 3   
h postprandially,   
elevation of HOB and   
avoidance of chocolate,   
tomato based foods,   
alcohol, peppermint,   
caffeinated products,   
citrus fruits/drinks and   
onions. Reviewed weight   
loss,and 30' physical   
activity most days of the   
week. Discussed eating   
plan with avoidance of   
foods high in   
saturated/trans fats,   
limit sugar-sweetened   
beverages/foods, increase   
vegetable/fruit/whole   
grain consumption. Choose   
low-fat dairy products,   
fish, poultry, beans AND   
nuts.  
Time Code:  
1. Preparation for   
patient's visit   
(reviewing chart, current   
medical record, outside   
health provider records,   
previous history, exam,   
test, procedure, and   
medications)  
2. Face-to-Face encounter   
obtaining history from   
patient/family/caregivers  
; performing evaluation   
and exam; ordering tests   
or procedures; referring   
and communicating with   
other health care   
providers; counseling and   
education of the   
patient/family/caregivers  
; independently   
interpreting results   
(tests, labs, procedures,   
imaging) and   
communicating and   
explaining results to the   
patient/family/caregivers  
3. Coordination of care;   
preparing and printing   
discharge instructions   
and any educational   
material for the   
patient/family/caregivers  
. Documenting clinical   
information into the   
electronic medical record  
4. Reviewing OARRS as   
needed  
MDM: 1) complexity: more   
than 1 stable chronic   
condition addressed or 1   
acute illness addressed.   
2) Data: tests   
interpreted and/or   
ordered, took independent   
history or records   
reviewed. 3) Risk:   
moderate risk due to   
nature of medical   
conditions/comorbidity or   
medications ordered or   
surgical or procedural   
referral  
  
Chief Complaint  
ISSUES WITH ACID REFLUX,   
HEART BURN, NAUSEA. TRIED   
PEPCID 20MG , TUMS,   
ROLAIDS.  
  
History of Present   
Illness  
Karina comes to the office   
for complaints of   
dyspepsia. Acid reflux   
has been occurring since   
last year. She believes   
it worsened when she   
gained 60#after breaking   
her leg 2Y ago. Also   
notes stress/anxiety   
makes heartburn worse.   
TUMS/Rolaids offer no   
help. + nausea. +   
burping. + epigastric   
pain. Food triggers:   
none. Sometimes food   
helps ease the epigastric   
discomfort. No frequent   
use NSAID/alcohol use.   
Dyspepsia worse @ HS or   
in AM. Bowels as per   
usual. Pepcid helps (for   
only part of the day).   
Caffeine consumption:   
1-2C QAM. Energy drink:   
one daily.  
Anxiety- depends on the   
day or the time of the   
month; has had anxiety   
for most of life. Lexapro   
initially helped w/   
depression and with   
anxiety. No panic   
attacks. Sleep: well.   
More irritable with small   
issues. Anxious about   
random things 3-4d/w.  
Has established OB GYN   
that she follows with in   
[place]. Receives her   
PAP's in [place].  
c/o bottoms of both feet   
painful and posterior   
right calf. Denies   
warmth/tenderness/swellin  
g to joints.  
  
Review of Systems  
Constitutional: feeling   
tired and recent weight   
gain.  
ENT: no sore throat.  
Cardiovascular: no chest   
pain, no palpitations and   
no lower extremity edema.  
Gastrointestinal:   
abdominal pain, heartburn   
and nausea, but no   
constipation, no   
diarrhea, no dysphagia   
and no vomiting.  
Musculoskeletal:   
arthralgias and joint   
pain localized to one or   
more joints . bilateral   
feet AND R leg.  
Psychiatric: feelings of   
anxiety, but no sleep   
disturbances.  
  
Active Problems  
Proble (more content not   
included)...        Normal                                     
CISSOID  
   
                                                    Tobacco Screening.on    
   
                                                    Adult depression   
screening   
assessment      Yes                                             [facility]  
[employer] Phone:   
[phone]  
   
                                                    Fall risk   
assessment                              a) No falls within the   
last year                                                   [facility]  
[employer] Phone:   
[phone]  
   
                                                    Tobacco use status   
[facility]            a) Yes                                          [facility]  
Work Phone:   
[phone]  
   
                                                    MRI [facility] RIGHT WITHOUT CONT  
[facility]   
   
                                                    MRI ANKLE RIGHT   
WITHOUT CONTRAST                        MRI of the right ankle  
CLINICAL HISTORY: Right   
ankle pain. History of   
old ankle fracture.  
COMPARISON: Ankle x-rays   
2021 including ankle   
x-rays 2020  
TECHNIQUE: Noncontrast   
ankle MRI performed  
FINDINGS: The Achilles   
tendon is intact. Plantar   
fascia is intact. Small   
os  
trigonum noted. Mild   
dorsal talonavicular   
spurring, unchanged since   
2021.  
Physiologic fluid in the   
tibiotalar and subtalar   
joints is noted. There is   
a  
healed oblique fracture   
deformity of the distal   
fibular shaft as seen on   
image  
6 of the axial T1   
sequence. No acute   
fracture or osteochondral   
lesion. No  
discrete tear of the   
flexor, peroneal or   
extensor tendons or   
tenosynovitis. No  
evidence of acute lateral   
or deltoid ligament   
injury. No tarsal tunnel   
mass.  
Muscles are maintained.   
No evidence of tarsal   
coalition.  
IMPRESSION:  
1. Healed oblique   
fracture deformity of the   
distal fibular shaft   
noted. No  
acute fracture or   
osteochondral lesion.  
2. No discrete tendon   
tear or tenosynovitis. Normal                                  [facility]  
   
                                                    XR COMPARISON IMPORTon    
   
                                                            This order has been   
auto-finalized and does   
not contain a result.                                         [facility]  
   
                                                    ANKLE, COMPLETE, MIN 3 VIEWS  
on 2020   
   
                                                    ANKLE, COMPLETE,   
MIN 3 VIEWS                             MRN: [id number]  
Patient Name: KARINA LINDSAY  
  
STUDY:  
ANKLE, COMPLETE, MIN 3   
VIEWS;Right; 2020   
9:04 pm  
  
INDICATION:  
injury.  
  
COMPARISON:  
None.  
  
ACCESSION NUMBER(S):  
[id number]  
  
ORDERING CLINICIAN:  
KEELY MYLES  
  
FINDINGS:  
There is a distal fibular   
spiral fracture with mild   
displacement and  
moderate amount of   
lateral ankle soft tissue   
swelling. There is no  
widening of the mortise.   
There is a small ankle   
effusion.  
  
IMPRESSION:  
Distal fibular spiral   
fracture with lateral   
ankle soft tissue   
swelling  
  
Electronically signed by:   
ESTELLE MARTINEZ MD [facility]  
   
                                                    Provider Note - ED v2on    
   
                                                    Provider Note - ED   
v2                                      Provider Note - ED v2:  
Chart Review:  
ED NOTES  
ED NOTES:  
  
HPI:  
29 year old female   
presents to the ED with   
c/o ankle pain. Pt sts at  
approximately 19:00 she   
slipped and fell at home   
causing injury to the R   
ankle.  
Pt is unable to bear   
weight on the ankle.  
  
  
ROS:  
In addition to that   
documented in the HPI   
above, the additional ROS   
was  
obtained:  
  
Constitutional: Denies   
fevers or chills  
Eyes: Denies vision   
changes  
ENMT: Denies sore throat  
CV: Denies chest pain  
Resp: Denies SOB  
GI: Denies vomiting or   
diarrhea  
: Denies painful   
urination  
MSK: Denies recent trauma  
Skin: Denies new rashes  
Neuro: Denies new   
numbness or tingling or   
weakness  
Endocrine: Denies   
unexpected weight loss  
Heme: Denies bleeding   
disorders  
  
All other systems   
reviewed and negative.  
  
  
Physical Exam  
  
I have reviewed the   
triage vital signs.  
Const: Well nourished,   
well developed, appears   
stated age, no acute   
distress  
Eyes: PERRL, EOM intact,   
no conjunctival   
injection, vision grossly   
normal  
HENT: Neck supple without   
meningismus , Moist   
mucous membranes, no   
pharyngeal  
swelling or exudate  
CV: Regular rate and   
rhythm, Warm,   
well-perfused   
extremities. Chest non   
tender  
RESP: Lungs clear   
bilaterally, Unlabored   
respiratory effort  
GI: soft, non-tender,   
non-distended, no masses  
MSK: No gross deformities   
appreciated  
Back: Non tender, no pain   
with ROM  
Skin: Warm, dry. No   
rashes  
Neuro: Alert and oriented   
x4, GCS 15 , CNs II-XII   
grossly intact. Sensation   
and  
motor function of   
extremities grossly   
intact.  
Psych: Appropriate mood   
and affect.  
  
Portions of this note   
were dictated by speech   
recognition. An attempt   
at proof  
reading was made to   
minimize errors. Minor   
errors in transcription   
may be  
present. Please call if   
questions.  
I, Enriqueta Barber, am   
scribing for and in the   
presence of Keely JACKSON.  
I, Keely JACKSON, attests all medical   
record entries made by   
the  
scribe were under my   
direction and personally   
dictated by me. I have   
reviewed  
the chart and agree that   
the record accurately   
reflects my performance   
of the  
history, physical, and   
assessment and plan. I   
have also personally   
directed,  
reviewed, and agree with   
the discharge   
instructions.  
  
  
HISTORY OF PRESENTING   
ILLNESS  
KARINA is a 29 year old   
Female and was seen by me   
at 2020 20:35 for   
a  
chief complaint of ankle   
pain/injury (Patient   
 slipped outside and fell   
on my  
ankle and felt something   
pop.  Patient has edema   
to lateral aspect of   
right  
ankle. Patient has   
palpable pulses and foot   
is pink, warm and   
dry.)(1).  
  
Triage Information: Most   
recent Vital Sign Value   
Date  
Temp (F): 98.7 2020   
20:24  
Temp (C): 37 2020   
20:24  
Heart Rate (beats/min):   
103 2020 20:24  
Respirations   
(breaths/min): 22   
2020 20:24  
SpO2 (%): 97 2020   
20:24  
BP Systolic (mm Hg): 145   
2020 20:24  
BP Diastolic (mm Hg): 90   
2020 20:24  
  
  
  
  
PAST MEDICAL HISTORY  
ATTESTATION: I have   
reviewed and confirmed   
nurse's/medic's notes for   
patient's  
medications, allergies,   
medical history, and   
surgical history  
  
ALLERGIES/INTOLERANCES:   
No Known Allergies  
  
HEALTH HISTORY: No   
documented data.  
  
OUTPATIENT MEDICATIONS:   
Home Medications Review   
Status for   
Reconciliation: N/A  
Med Status: N/A  
  
No documented data.  
  
SIGNIFICANT EVENTS: No   
documented data.  
  
OB/GYN: Is Pregnant:   
no(1) Is Breastfeeding:   
no(1)  
  
  
  
  
RESULTS/VITAL SIGNS  
RESULTS:  
Radiology Results:  
  
Impression:  
  
Distal fibular spiral   
fracture with lateral   
ankle soft tissue   
swelling  
  
  
Xray Ankle 3 View [2020 9:31PM]  
  
VITAL SIGNS:  
  
T PRBP SpO2O2(LPM) %FiO2   
Method  
2020   
23:15:00-1131412/86 95   
room air, no respiratory  
support  
2020   
20:24:00-2624991510/90 97   
room air, no respiratory  
support  
  
  
  
  
  
PROCEDURE  
FRACTURE REDUCTION  
  
  
Procedure performed by:   
(Tim Webster PA-C)  
Assistant(s): none  
Findings: grossly normal   
anatomy  
Specimen: no  
Estimated Blood Loss   
(mL): none  
Post-Procedure Diagnosis:   
Right fibular fracture  
  
The patient presents for   
of the right due to a   
fracture. The bone   
fragments  
were realigned into the   
correct position using   
the posterior and sugar   
tong  
splint technique.. The   
fracture was immobilized   
with a sugar tong splint.   
The  
patient was advised to   
follow up with referred   
orthopedic.  
Complications: There were   
no complications   
associated with the   
procedure  
  
  
  
CLINICAL IMPRESSION  
Diagnosis/Annotation: ED   
Dx  
Name:Right fibular   
fracture  
Code:S82.401A  
  
Dispostion: discharged  
  
Type: home  
  
  
ATTESTATION  
  
  
CRITICAL CARE TIME  
Is this a critically ill   
patient: no  
  
  
  
  
  
Electronic Signatures:  
Keely Myles   
(PAC) (Signed 2020   
23:26)  
Authored: Provider Note -   
ED v2  
Enriqueta Barber   
(Scribe) (Entered   
2020 20:50)  
Entered: Provider Note -   
ED v2  
  
  
Last Updated: 2020   
23:26 by Keely Myles (PAC)  
  
References:  
1. Data Referenced From   
 Triage - ED  2020   
20:24               [facility]  
   
                                                    Risk Screen - Adult Emergenc  
yon 2020   
   
                                                    Risk Screen -   
Adult Emergency                         Preferred Language:  
Preferred Language:  
Preferred Language for   
Discussing Health Care   
(patient/designee)English  
  
Advanced Directives:  
Advance Directive/DNRno  
Advance Directive   
Information   
Givenpatient/family   
declined  
  
Family Violence Adult:  
Abuse Screen:  
Are you or have you been   
threatened or abused   
physically, emotionally,   
or  
sexually by anyoneno  
  
Learning Assessment   
(Patient):  
Learning Assessment   
(Patient):  
Patient is Able to be   
Assessed for Learningyes  
Factors Influencing   
Readiness to Learnpain  
Factors that Impact   
Ability to Learnacuteness   
of illness  
Devices/Methods Used to   
Communicatenone  
Learning   
Preferencesindividual   
instruction; skill   
demonstration; verbal  
instruction  
Cultural   
Considerationsnone  
Developmental   
Considerationsnone  
Jew   
Considerationsnone  
Other Learnersfather;   
mother  
  
Learning Assessment   
(Other Learner):  
Learning Assessment   
(Other Learner):  
Other learner   
availableyes...  
Learnerfather; mother  
Factors Influencing   
Readiness to   
Learninterest in learning  
Factors that Impact   
Ability to Learnnone  
Devices/Methods Used to   
Communicatenone  
Learning   
Preferencesindividual   
instruction, skill   
demonstration, verbal  
instruction  
Cultural   
Considerationsnone  
Developmental   
Considerationsnone  
Jew   
Considerationsnone  
  
Pressure   
Injury/TB/Substance:  
Pressure Injury:  
Pressure Injury Present   
on Admissionno  
Do you have a coughno  
  
Substance Use Current or   
Former Historynever:   
Cigarette/Tobacco,  
e-Cigarette/Vaping,   
Alcohol, Street Drugs  
  
  
Admission Risk Screen:  
Significant   
IndicatorsComplete  
  
CAGE:  
CAGE:  
Is this an injured   
patient at a Trauma   
Center   
([facility]/[place]/  
[place]): no  
  
  
Electronic Signatures:  
Brianna Romero (RN) (Signed   
2020 20:29)  
Authored: Preferred   
Language, Advanced   
Directives, Family   
Violence Adult,  
Learning Assessment   
(Patient), Learning   
Assessment (Other   
Learner), Pressure  
Injury/TB/Substance, CAGE  
  
  
Last Updated: 2020   
20:29 by Brianna Romero (RN) [facility]  
   
                                                    Triage - EDon 2020   
   
                                        Triage - ED         Quick Triage:  
Are You Pregnantno  
Are You Currently   
Breastfeedingno  
  
Chart Review:  
CHIEF COMPLAINT  
  
KARINA LINDSAY is a Female   
patient with a chief   
complaint of ankle   
pain/injury  
(Patient  slipped outside   
and fell on my ankle and   
felt something pop.    
Patient  
has edema to lateral   
aspect of right ankle.   
Patient has palpable   
pulses and  
foot is pink, warm and   
dry.).  
Triage Date/Time:   
2020 20:24  
Pain Rating (0-10): 7 =   
Severe  
Pain location: right   
ankle  
Vital Signs:  
Temperature: 98.7F (   
37.0C)  
Blood Pressure: 145/90   
Mean:  
Heart Rate: 103  
Respiratory Rate: 22  
Pulse Oximetry: 97% on   
room air, no respiratory   
support. Height: 5 feet   
6.00  
inches. 167.6 CM  
Weight: 170.0 pounds.   
Calculated 77.1 kg.   
(stated)  
Calculated BMI (kg/m2):   
27.447 Calculated BSA   
(m2) 1.89  
  
Kingsley Coma Scale:  
Best Eye Response: (E4)   
spontaneous  
Best Motor Response: (M6)   
obeys commands  
Best Verbal Response:   
(V5) oriented  
Kingsley Score: 15  
  
  
Last menstrual period:   
2020  
Patient has homicidal   
thoughts: no  
  
Symptoms Are POSITIVE   
For:  
decreased ROM, deformity,   
difficulty bearing wt and   
swelling.  
Symptoms Are Negative   
For:  
abrasion, bleeding,   
bruising and numbness.  
  
  
Risk Screens  
Suicide Risk Screen  
  
In the Past Month: Have   
you wished you were dead   
or wished you could go to  
sleep and not wake up no  
In the Past Month: Have   
you had any actual   
thoughts of killing   
yourself no  
In Your Lifetime: Have   
you ever done anything,   
started to do anything,   
or  
prepared to do anything   
to end your life no  
  
  
  
Alexandra Fall Scale   
Screening  
Has the patient fallen   
before (or is the patient   
in the ED as a result of   
a  
fall) has had a fall  
Does the patient have an   
impaired gait has   
impaired gait  
Is the patient   
cognitively impaired not   
cognitively impaired  
  
Alexandra Fall Scale  
History of falling   
(immediate or previous)   
yes (25)  
Secondary Diagnosis no   
(0)  
Intravenous Therapy/   
Heparin/Saline Lock no (0  
Gait/Transferring   
impaired (20)  
Ambulatory Aids   
none/bedrest/nurse assist   
(0)  
Mental Status oriented to   
own ability (0)  
Alexandra Fall Risk Score: 45  
  
Interventions:  
Alexandra Fall Interventions:   
*LOW AND MODERATE   
INTERVENTIONS PLUS:  
* supervised toileting at   
all times  
  
  
PAIN  
  
Pain Scale Used: ELIDA  
Pain Rating (0-10): 7 =   
Severe  
  
  
  
  
  
Past Medical History:  
Past Medical History   
Reviewedyes  
  
  
Electronic Signatures:  
Brianna Romero (RN) (Signed   
2020 20:28)  
Authored: Triage, Past   
Medical History  
  
  
Last Updated: 2020   
20:28 by Brianna Romero (RN) [facility]  
   
                                                    XR Ankle 3+ Views Lefton    
   
                                                    INR Coag RelTime   
(Bld)                                   Exam Date/Time:2018   
12:46 ESTReason for   
Exam:PainReportSTUDY:XR   
Ankle 3+ Views Left;   
2018 12:46   
pmINDICATION:Pain.COMPARI  
SON:None.ACCESSION   
NUMBER(S):[id number]  
1ORDERING   
CLINICIAN:Tim Isaac:No acute   
fracture is identified.   
No dislocation is seen.   
There are no lytic or   
blastic lesions. No   
radiopaque foreign bodies   
are noted. Ankle mortise   
is   
maintained.IMPRESSION:No   
radiographic evidence of   
an acute fracture.*****   
FINAL REPORT   
*****Dictated: 2018   
1:23 pm Leon Castanon MD   
ESigned (Electronic   
Signature): 2018   
1:23 pmSigned by: Leon Castanon MD Technologist:   
[facility]  
  
  
  
Vital Signs  
  
  
                          Date Time    Vital Sign   Value        Performing   
Clinician                               Facility  
   
                                                    2024   
08:                              Body mass index   
(BMI) [Ratio]             40.51 kg/m2               Narda Peters MD  
Work Phone:   
[phone]                          [facility]  
   
                                                    2024   
08:          Body weight         113.85 kg           Narda Peters MD  
Work Phone:   
[phone]                          [facility]  
   
                                                    2024   
08:                              Diastolic blood   
pressure                  76 mm[Hg]                 Narda Peters MD  
Work Phone:   
[phone]                          [facility]  
   
                                                    2024   
08:                              Systolic blood   
pressure                  120 mm[Hg]                Narda Peters MD  
Work Phone:   
[phone]                          [facility]  
   
                                                    2024   
09:                              Body mass index   
(BMI) [Ratio]             40.16 kg/m2               Janet Plotts   
APRN.CNM  
Work Phone:   
[phone]                          [facility]  
   
                                                    2024   
09:          Body weight         112.86 kg           Janet Plotts   
APRN.CNM  
Work Phone:   
[phone]                          [facility]  
   
                                                    2024   
09:                              Diastolic blood   
pressure                  84 mm[Hg]                 Janet Plotts   
APRN.CNM  
Work Phone:   
[phone]                          [facility]  
   
                                                    2024   
09:                              Systolic blood   
pressure                  134 mm[Hg]                Janet Plotts   
APRN.CNM  
Work Phone:   
[phone]                          [facility]  
   
                                                    2024   
09:                              Body mass index   
(BMI) [Ratio]             39.71 kg/m2               Xenia Martines MD  
Work Phone:   
[phone]                          [facility]  
   
                                                    2024   
09:          Body weight         111.58 kg           Xenia Martines MD  
Work Phone:   
[phone]                          [facility]  
   
                                                    2024   
09:                              Diastolic blood   
pressure                  84 mm[Hg]                 Xenia Martines MD  
Work Phone:   
[phone]                          [facility]  
   
                                                    2024   
09:                              Systolic blood   
pressure                  129 mm[Hg]                Xenia Martines MD  
Work Phone:   
[phone]                          [facility]  
   
                                                    2024   
08:          Body temperature    98.4 [degF]         [facility]  
Work Phone:   
[phone]                          [facility]  
   
                                                    2024   
08:                              Diastolic blood   
pressure                  80 mm[Hg]                 [facility]  
Work Phone:   
[phone]                          [facility]  
   
                                                    2024   
08:          Heart rate          88 /min             [facility]  
Work Phone:   
[phone]                          [facility]  
   
                                                    2024   
08:                              SaO2% (BldA) [Mass   
fraction]                 98 %                      [facility]  
Work Phone:   
[phone]                          [facility]  
   
                                                    2024   
08:                              Systolic blood   
pressure                  119 mm[Hg]                [facility]  
Work Phone:   
[phone]                          [facility]  
   
                                                    07-   
08:                              Body mass index   
(BMI) [Ratio]             39.87 kg/m2               Narda Peters MD  
Work Phone:   
[phone]                          [facility]  
   
                                                    07-   
08:          Body weight         112.04 kg           Narda Peters MD  
Work Phone:   
[phone]                          [facility]  
   
                                                    07-   
08:                              Diastolic blood   
pressure                  78 mm[Hg]                 Narda Peters MD  
Work Phone:   
[phone]                          [facility]  
   
                                                    07-   
08:                              Systolic blood   
pressure                  123 mm[Hg]                Narda Peters MD  
Work Phone:   
[phone]                          [facility]  
   
                                                    2024   
09:                              Body mass index   
(BMI) [Ratio]             39.87 kg/m2               Letty Beaver APRN.CNM  
Work Phone:   
[phone]                          [facility]  
   
                                                    2024   
09:          Body weight         112.04 kg           Letty Beaver APRN.CNM  
Work Phone:   
[phone]                          [facility]  
   
                                                    2024   
09:                              Diastolic blood   
pressure                  80 mm[Hg]                 Letty Beaver APRN.CNM  
Work Phone:   
[phone]                          [facility]  
   
                                                    2024   
09:                              Systolic blood   
pressure                  123 mm[Hg]                Letty CENTENOCNM  
Work Phone:   
[phone]                          [facility]  
   
                                                    2024   
08:          Body temperature    97.11 [degF]        [facility]  
Work Phone:   
[phone]                          [facility]  
   
                                                    2024   
08:                              Diastolic blood   
pressure                  74 mm[Hg]                 [facility]  
Work Phone:   
[phone]                          [facility]  
   
                                                    2024   
08:          Heart rate          78 /min             [facility]  
Work Phone:   
[phone]                          [facility]  
   
                                                    2024   
08:                              SaO2% (BldA) [Mass   
fraction]                 98 %                      [facility]  
Work Phone:   
[phone]                          [facility]  
   
                                                    2024   
08:                              Systolic blood   
pressure                  139 mm[Hg]                [facility]  
Work Phone:   
[phone]                          [facility]  
   
                                                    2024   
08:                              Diastolic blood   
pressure                  83 mm[Hg]                 [facility]  
Work Phone:   
[phone]                          [facility]  
   
                                                    2024   
08:                              Systolic blood   
pressure                  136 mm[Hg]                [facility]  
Work Phone:   
[phone]                          [facility]  
   
                                                    2024   
08:          Body temperature    97.9 [degF]         [facility]  
Work Phone:   
[phone]                          [facility]  
   
                                                    2024   
08:          Heart rate          70 /min             [facility]  
Work Phone:   
[phone]                          [facility]  
   
                                                    2024   
08:                              SaO2% (BldA) [Mass   
fraction]                 98 %                      [facility]  
Work Phone:   
[phone]                          [facility]  
   
                                                    2024   
09:                              Diastolic blood   
pressure                  82 mm[Hg]                 Shayna Escalera MD  
Work Phone:   
[phone]                          [facility]  
   
                                                    Comment on   
above:                                  [facility]   
   
                                                    2024   
09:                              Systolic blood   
pressure                  118 mm[Hg]                Shayna Escalera MD  
Work Phone:   
[phone]                          [facility]  
   
                                                    Comment on   
above:                                  [facility]   
   
                                                    2024   
09:                              Body mass index   
(BMI) [Ratio]             39.58 kg/m2               Shayna Escalera MD  
Work Phone:   
[phone]                          [facility]  
   
                                                    2024   
09:          Body weight         111.22 kg           Shayna Escalera MD  
Work Phone:   
[phone]                          [facility]  
   
                                                    2024   
09:                              Body mass index   
(BMI) [Ratio]             38.9 kg/m2                Narda Peters MD  
Work Phone:   
[phone]                          [facility]  
   
                                                    2024   
09:          Body weight         109.32 kg           Narda Peters MD  
Work Phone:   
[phone]                          [facility]  
   
                                                    2024   
09:                              Diastolic blood   
pressure                  78 mm[Hg]                 Narda Peters MD  
Work Phone:   
[phone]                          [facility]  
   
                                                    2024   
09:                              Systolic blood   
pressure                  123 mm[Hg]                Narda Peters MD  
Work Phone:   
[phone]                          [facility]  
   
                                                    2024   
09:                              Body mass index   
(BMI) [Ratio]             39.45 kg/m2               Janet Lilly   
APRN.CNM  
Work Phone:   
[phone]                          [facility]  
   
                                                    2024   
09:          Body weight         110.86 kg           Janet Lilly   
APRN.CNM  
Work Phone:   
[phone]                          [facility]  
   
                                                    2024   
09:                              Diastolic blood   
pressure                  78 mm[Hg]                 Janet Lilly   
APRN.CNM  
Work Phone:   
[phone]                          [facility]  
   
                                                    2024   
09:                              Systolic blood   
pressure                  121 mm[Hg]                Janet Lilly   
APRN.CNM  
Work Phone:   
[phone]                          [facility]  
   
                                                    2024   
09:                              Body mass index   
(BMI) [Ratio]             38.77 kg/m2               Letty Beaver   
APRN.CNM  
Work Phone:   
[phone]                          [facility]  
   
                                                    2024   
09:          Body weight         108.95 kg           Lettykelly Beaver   
APRN.CNM  
Work Phone:   
[phone]                          [facility]  
   
                                                    2024   
09:                              Diastolic blood   
pressure                  81 mm[Hg]                 Letty Beaver   
APRN.CNM  
Work Phone:   
[phone]                          [facility]  
   
                                                    2024   
09:                              Systolic blood   
pressure                  120 mm[Hg]                Letty Beaver   
APRN.CNM  
Work Phone:   
[phone]                          [facility]  
   
                                                    2024   
09:                              Body mass index   
(BMI) [Ratio]             38.83 kg/m2               Janet Plotsiena   
APRN.CNM  
Work Phone:   
[phone]                          [facility]  
   
                                                    2024   
09:          Body weight         109.14 kg           Janet Plotsiena   
APRN.CNM  
Work Phone:   
[phone]                          [facility]  
   
                                                    2024   
09:                              Diastolic blood   
pressure                  70 mm[Hg]                 Janet Plotts   
APRN.CNM  
Work Phone:   
[phone]                          [facility]  
   
                                                    2024   
09:                              Systolic blood   
pressure                  118 mm[Hg]                Janet Plotts   
APRN.CNM  
Work Phone:   
[phone]                          [facility]  
   
                                                    06-   
13:                              Body mass index   
(BMI) [Ratio]             38.03 kg/m2               Shayna Escalera MD  
Work Phone:   
[phone]                          [facility]  
   
                                                    06-   
13:          Body weight         106.87 kg           Shayna Escalera MD  
Work Phone:   
[phone]                          [facility]  
   
                                                    06-   
13:                              Diastolic blood   
pressure                  70 mm[Hg]                 Shayna Escalera MD  
Work Phone:   
[phone]                          [facility]  
   
                                                    06-   
13:                              Systolic blood   
pressure                  124 mm[Hg]                Shayna Escalera MD  
Work Phone:   
[phone]                          [facility]  
   
                                                    2024   
07:          Body height         167.6 cm            Keely Tony RD  
Work Phone:   
[phone]                          [facility]  
   
                                                    2024   
11:                              Body mass index   
(BMI) [Ratio]             38.41 kg/m2               Letty Beaver   
APRN.CNM  
Work Phone:   
[phone]                          [facility]  
   
                                                    2024   
11:          Body weight         107.96 kg           Letty Beaver   
APRN.CNM  
Work Phone:   
[phone]                          [facility]  
   
                                                    2024   
11:                              Diastolic blood   
pressure                  76 mm[Hg]                 Letty Beaver   
APRN.CNM  
Work Phone:   
[phone]                          [facility]  
   
                                                    2024   
11:                              Systolic blood   
pressure                  122 mm[Hg]                Letty Beaver   
APRN.CNM  
Work Phone:   
[phone]                          [facility]  
   
                                                    2024   
08:                              Body mass index   
(BMI) [Ratio]             37.28 kg/m2               Janet Lilly   
APRN.CNM  
Work Phone:   
[phone]                          [facility]  
   
                                                    2024   
08:          Body weight         104.78 kg           Janet Lilly   
APRN.CNM  
Work Phone:   
[phone]                          [facility]  
   
                                                    2024   
08:                              Diastolic blood   
pressure                  70 mm[Hg]                 Janet Plotts   
APRN.CNM  
Work Phone:   
[phone]                          [facility]  
   
                                                    2024   
08:                              Systolic blood   
pressure                  110 mm[Hg]                Janet Plotsiena   
APRN.CNM  
Work Phone:   
[phone]                          [facility]  
   
                                                    2024   
09:          Body weight         103.42 kg           Letty Beaver   
APRN.CNM  
Work Phone:   
[phone]                          [facility]  
   
                                                    2024   
09:                              Diastolic blood   
pressure                  66 mm[Hg]                 Letty Beaver   
APRN.CNM  
Work Phone:   
[phone]                          [facility]  
   
                                                    2024   
09:                              Systolic blood   
pressure                  118 mm[Hg]                Letty Beaver   
APRN.CNM  
Work Phone:   
[phone]                          [facility]  
   
                                                    2024   
09:          Body weight         102.06 kg           Bobbi Jalloh   
APRN.CNP  
Work Phone:   
[phone]                          [facility]  
   
                                                    2024   
09:                              Diastolic blood   
pressure                  78 mm[Hg]                 Bobbi Bakerhaven   
APRN.CNP  
Work Phone:   
[phone]                          [facility]  
   
                                                    2024   
09:                              Systolic blood   
pressure                  120 mm[Hg]                Bobbi Bakerhaven   
APRN.CNP  
Work Phone:   
[phone]                          [facility]  
   
                                                    2024   
11:          Body weight         99.79 kg            Shayna Escalera MD  
Work Phone:   
[phone]                          [facility]  
   
                                                    2024   
11:                              Diastolic blood   
pressure                  72 mm[Hg]                 Shayna Escalera MD  
Work Phone:   
[phone]                          [facility]  
   
                                                    2024   
11:                              Systolic blood   
pressure                  112 mm[Hg]                Shayna Escalera MD  
Work Phone:   
[phone]                          [facility]  
   
                                                    2024   
09:          Body height         165.1 cm            Farhad Wood   
APRN-CNP  
Work Phone:   
[phone]                          [facility]  
   
                                                    2024   
09:                              Body mass index   
(BMI) [Ratio]             36.78 kg/m2               Farhad Wood   
APRN-CNP  
Work Phone:   
[phone]                          [facility]  
   
                                                    2024   
09:          Body weight         100.25 kg           Farhad Wood   
APRN-CNP  
Work Phone:   
[phone]                          [facility]  
   
                                                    2024   
09:                              Diastolic blood   
pressure                  76 mm[Hg]                 Farhad Wood   
APRN-CNP  
Work Phone:   
[phone]                          [facility]  
   
                                                    2024   
09:          Heart rate          96 /min             Farhad Wood   
APRN-CNP  
Work Phone:   
[phone]                          [facility]  
   
                                                    2024   
09:                              SaO2% (BldA) [Mass   
fraction]                 97 %                      Farhad Wood   
APRN-CNP  
Work Phone:   
[phone]                          [facility]  
   
                                                    2024   
09:                              Systolic blood   
pressure                  124 mm[Hg]                Farhad Wood   
APRN-CNP  
Work Phone:   
[phone]                          [facility]  
   
                                                    2023   
14:          Body height         167.64 cm           Tim Carmichael  
Work Phone:   
[phone]                          [facility]  
Work Phone:   
[phone]  
   
                                                    2023   
14:                              Body mass index   
(BMI) [Ratio]             34.25 kg/m2               Tim Carmichael  
Work Phone:   
[phone]                          [facility]  
Work Phone:   
[phone]  
   
                                                    2023   
14:                              Body surface area   
Derived from formula      2.05 m2                   Tim Carmichael  
Work Phone:   
[phone]                          [facility]  
Work Phone:   
[phone]  
   
                                                    2023   
14:          Body weight         96.25 kg            Tim Carmichael  
Work Phone:   
[phone]                          [facility]  
Work Phone:   
[phone]  
   
                                                    2023   
14:                              Diastolic blood   
pressure                  70 mm[Hg]                 Tim Carmichael  
Work Phone:   
[phone]                          [facility]  
Work Phone:   
[phone]  
   
                                                    2023   
14:          Heart rate          85 /min             Tim Carmichael  
Work Phone:   
[phone]                          [facility]  
Work Phone:   
[phone]  
   
                                                    2023   
14:                              SaO2% (BldA) [Mass   
fraction]                 97 %                      Tim Carmichael  
Work Phone:   
[phone]                          [facility]  
Work Phone:   
[phone]  
   
                                                    2023   
14:                              Systolic blood   
pressure                  106 mm[Hg]                Tim Carmichael  
Work Phone:   
[phone]                          [facility]  
Work Phone:   
[phone]  
   
                                                    2023   
09:          Body height         167.6 cm            Xenia Martines MD  
Work Phone:   
[phone]                          [facility]  
   
                                                    2023   
09:          Body weight         96.16 kg            Xenia Martines MD  
Work Phone:   
[phone]                          [facility]  
   
                                                    2023   
09:                              Diastolic blood   
pressure                  70 mm[Hg]                 Xenia Martines MD  
Work Phone:   
[phone]                          [facility]  
   
                                                    2023   
09:                              Systolic blood   
pressure                  118 mm[Hg]                Xenia Martines MD  
Work Phone:   
[phone]                          [facility]  
   
                                                    2022   
14:          Body height         167.64 cm           Tim Carmichael  
Work Phone:   
[phone]                          MP-Medical   
[facility]   
[facility]  
Work Phone:   
[phone]  
   
                                                    2022   
14:                              Body mass index   
(BMI) [Ratio]             33.82 kg/m2               Tim Carmichael  
Work Phone:   
[phone]                          MP-Medical   
[facility]  
Work Phone:   
[phone]  
   
                                                    2022   
14:                              Body surface area   
Derived from formula      2.04 m2                   Tim Carmichael  
Work Phone:   
[phone]                          MP-Medical   
[facility]  
Work Phone:   
[phone]  
   
                                                    2022   
14:          Body weight         95.06 kg            Tim Carmichael  
Work Phone:   
[phone]                          MP-Medical   
[facility]  
Work Phone:   
[phone]  
   
                                                    2022   
14:                              Diastolic blood   
pressure                  82 mm[Hg]                 Tim Carmichael  
Work Phone:   
[phone]                          MP-Medical   
[facility]   
[facility]  
Work Phone:   
[phone]  
   
                                                    2022   
14:          Heart rate          90 /min             Tim Carmichael  
Work Phone:   
[phone]                          MP-Medical   
[facility]   
[facility]  
Work Phone:   
[phone]  
   
                                                    2022   
14:                              SaO2% (BldA) [Mass   
fraction]                 96 %                      Tim Carmichael  
Work Phone:   
[phone]                          MP-Medical   
[facility]  
Work Phone:   
[phone]  
   
                                                    2022   
14:                              Systolic blood   
pressure                  126 mm[Hg]                Tim Carmichael  
Work Phone:   
[phone]                          MP-Medical   
[facility]  
Work Phone:   
[phone]  
   
                                                    2021   
10:          Body height         167.6 cm            Renetta Bark DPM  
Work Phone:   
[phone]                          [facility] [employer]   
[facility]  
   
                                                    2021   
10:                              Body mass index   
(BMI) [Ratio]             30.1 kg/m2                Renetta Bark DPM  
Work Phone:   
[phone]                          [facility]  
   
                                                    2021   
10:          Body temperature    97.39 [degF]        Renetta Bark DPM  
Work Phone:   
[phone]                          [facility]  
   
                                                    2021   
10:          Body weight         84.6 kg             Renetta Bark DPM  
Work Phone:   
[phone]                          [facility]  
   
                                                    2020   
08:                              BMI (Body Mass   
Index)              27.43 kg/m2         [facility] [employer]  
   
                                                    2020   
08:      Body Temperature 98.4 [degF]     [facility]  
   
                                                    2020   
08:      Body weight     77.1 kg         [facility]  
   
                                                    2020   
08:      Height          167.6 cm        [facility] [employer]  
   
                                                    2020   
10:                              BMI (Body Mass   
Index)              27.43 kg/m2         [facility] [employer]  
   
                                                    2020   
10:      Body Temperature 99.5 [degF]     [facility]     [employer]  
   
                                                    2020   
10:      Body weight     77.1 kg         [facility]     [employer]  
   
                                                    2020   
10:      Height          167.6 cm        [facility] [employer]  
   
                                                    02-   
08:                              BMI (Body Mass   
Index)              27.43 kg/m2         [facility]         [employer]  
   
                                                    02-   
08:      Body Temperature 98.71 [degF]    [facility]     [employer]  
   
                                                    02-   
08:      Body weight     77.1 kg         [facility]  
   
                                                    02-   
08:      Height          167.6 cm        [facility]  
   
                                                    2020   
09:                              BMI (Body Mass   
Index)              27.44 kg/m2         [facility]  
   
                                                    2020   
09:      Body Temperature 97.59 [degF]    [facility]  
   
                                                    2020   
09:      Body weight     77.11 kg        [facility]  
   
                                                    2020   
09:      Height          167.6 cm        [facility]     [employer]  
  
  
  
Encounters  
  
  
                          Encounter Date Encounter Type Care Provider Facility  
   
                                Start: 2024 Telephone encounter Narda busby MD  
Work Phone:   
[phone]                          OB/Gynecology  
   
                                        Comment on above:   Induction   
   
                                                    Start: 2024  
End: 2024                         Patient encounter   
procedure                               [facility]   
Ultrasound  
Work Phone:   
[phone]                          OB/Gynecology  
   
                                        Comment on above:   Encounter for ultras  
ound to check fetal growth (Primary Dx);  
GDM, class A2;  
Polyhydramnios in third trimester complication, single or   
unspecified fetus;  
36 weeks gestation of pregnancy   
   
                                                            36 weeks gestation o  
f pregnancy (Primary Dx);  
Encounter for supervision of high risk pregnancy in third   
trimester, antepartum;  
Insulin controlled gestational diabetes mellitus (GDM) in third   
trimester;  
Other obesity due to excess calories affecting pregnancy in third   
trimester   
   
                                                    Start: 2024  
End: 2024                         Patient encounter   
procedure                               Janet Lilly APRN.CNM  
Work Phone:   
[phone]                          OB/Gynecology  
   
                                        Comment on above:   36 weeks gestation o  
f pregnancy (Primary Dx);  
Encounter for supervision of high risk pregnancy in third   
trimester, antepartum;  
Insulin controlled gestational diabetes mellitus (GDM) in third   
trimester   
   
                                                    Start: 2024  
End: 2024     ambulatory          JANET LILLY     Facility:[facility]  
   
                                                    Start: 2024  
End: 2024     ambulatory          JANET KG     Hematology/Oncology  
   
                                        Comment on above:   Impaired intestinal   
absorption (Primary Dx);  
Maternal iron deficiency anemia complicating pregnancy, third   
trimester   
   
                                                    Start: 2024  
End: 2024                         Patient encounter   
procedure                               Treatment Rm 15 Sacha   
[facility]  
Work Phone:   
[phone]                          Hematology/Oncology  
   
                                        Comment on above:   Encounter for superv  
ision of high risk pregnancy in third   
trimester, antepartum (Primary Dx);  
35 weeks gestation of pregnancy;  
Other obesity due to excess calories affecting pregnancy in third   
trimester;  
Insulin controlled gestational diabetes mellitus (GDM) in third   
trimester;  
Anemia during pregnancy in third trimester   
   
                                Start: 07- Telephone encounter Toan rhoades MD  
Work Phone:   
[phone]                          Hematology/Oncology  
   
                                        Comment on above:   Future Appointment   
   
                                                    Start: 07-  
End: 07-     ambulatory          TIM CARMICHAEL   Facility:[facility]  
   
                                                    Start: 07-  
End: 07-                         Patient encounter   
procedure                               Narda Peters MD  
Work Phone:   
[phone]                          OB/Gynecology  
   
                                        Comment on above:   Encounter for superv  
ision of high risk pregnancy in third   
trimester, antepartum (Primary Dx);  
35 weeks gestation of pregnancy;  
Other obesity due to excess calories affecting pregnancy in third   
trimester;  
Insulin controlled gestational diabetes mellitus (GDM) in third   
trimester   
   
                                                    Start: 2024  
End: 2024                         Patient encounter   
procedure                               Letty Beaver APRN.CNM  
Work Phone:   
[phone]                          OB/Gynecology  
   
                                        Comment on above:   34 weeks gestation o  
f pregnancy (Primary Dx);  
Encounter for supervision of high risk pregnancy in third   
trimester, antepartum;  
Other obesity due to excess calories affecting pregnancy in third   
trimester;  
Insulin controlled gestational diabetes mellitus (GDM) in third   
trimester;  
Anemia during pregnancy in third trimester;  
Elevated BP without diagnosis of hypertension   
   
                                                    Start: 2024  
End: 2024           ambulatory                Treatment Rm 9 Sacha   
[facility]  
Work Phone:   
[phone]                          Hematology/Oncology  
   
                                        Comment on above:   Impaired intestinal   
absorption (Primary Dx);  
Maternal iron deficiency anemia complicating pregnancy, third   
trimester   
   
                                                    Start: 2024  
End: 2024           ambulatory                Treatment Rm 13 Sacha   
[facility]  
Work Phone:   
[phone]                          Hematology/Oncology  
   
                                        Comment on above:   Impaired intestinal   
absorption (Primary Dx);  
Maternal iron deficiency anemia complicating pregnancy, third   
trimester   
   
                                                            Iron Supplementation  
   
   
                                Start: 2024 Telephone encounter Financial   
Navigator   
[facility]  
Work Phone:   
[phone]                          Financial Services  
   
                                        Comment on above:   [employer]  
on   
   
                                                    Start: 2024  
End: 2024     ambulatory          SHAYNA ESCALERA        Facility:[facility]  
   
                                                    Start: 2024  
End: 2024                         Patient encounter   
procedure                               Shayna Escalera MD  
Work Phone:   
[phone]                          OB/Gynecology  
   
                                        Comment on above:   34 weeks gestation o  
f pregnancy (Primary Dx);  
Encounter for supervision of high risk pregnancy in third   
trimester, antepartum;  
Other obesity due to excess calories affecting pregnancy in third   
trimester;  
Insulin controlled gestational diabetes mellitus (GDM) in third   
trimester;  
Elevated blood pressure reading without diagnosis of hypertension   
   
                                                    Start: 2024  
End: 2024     ambulatory          SHAYNA ESCALERA        Facility:[facility]  
   
                                                    Start: 2024  
End: 2024                         Patient encounter   
procedure                               Narda Peters MD  
Work Phone:   
[phone]                          OB/Gynecology  
   
                                        Comment on above:   33 weeks gestation o  
f pregnancy (Primary Dx);  
Encounter for supervision of high risk pregnancy in third   
trimester, antepartum;  
Other obesity due to excess calories affecting pregnancy in third   
trimester;  
Anemia during pregnancy in third trimester;  
Insulin controlled gestational diabetes mellitus (GDM) in third   
trimester   
   
                                                    Start: 2024  
End: 2024     ambulatory          [facility]  
   
                                                    Start: 2024  
End: 2024                         Patient encounter   
procedure                               Janet Lilly APRN.CNM  
Work Phone:   
[phone]                          OB/Gynecology  
   
                                        Comment on above:   33 weeks gestation o  
f pregnancy (Primary Dx);  
Encounter for supervision of high risk pregnancy in third   
trimester, antepartum;  
GDM, class A2   
   
                                                    Start: 2024  
End: 2024     ambulatory          [facility]  
   
                                                    Start: 2024  
End: 2024                         Patient encounter   
procedure                               Letty Beaver APRN.CNM  
Work Phone:   
[phone]                          OB/Gynecology  
   
                                        Comment on above:   32 weeks gestation o  
f pregnancy (Primary Dx);  
GDM, class A2;  
Encounter for supervision of high risk pregnancy in third   
trimester, antepartum   
   
                                                    Start: 2024  
End: 2024           [facility]  
   
                                                    Start: 2024  
End: 2024                         Encounter for general   
adult medical examination   
without abnormal findings               ERIC PITTS                                 [facility]  
   
                                                    Start: 2024  
End: 2024     ambulatory          [facility]   [facility]  
   
                          Start: 2024 ambulatory   Salvador Weaver RN Interna  
[place] [address]  
   
                                        Comment on above:   Blood management   
   
                                Start: 2024 Telephone encounter Shayna cody MD  
Work Phone:   
[phone]                          OB/Gynecology  
   
                                        Comment on above:   RX Request - Dexcom   
   
                                                            Orders   
   
                                                            Results   
   
                                                    Start: 2024  
End: 2024     ambulatory          TIMTANJA CARMICHAEL   Facility:[facility]  
   
                                                    Start: 2024  
End: 2024                         Patient encounter   
procedure                               Janet Lilly   
[facility]  
Work Phone:   
[phone]                          OB/Gynecology  
   
                                        Comment on above:   GDM, class A2 (Prima  
ry Dx);  
Encounter for supervision of high risk pregnancy in third   
trimester, antepartum;  
32 weeks gestation of pregnancy;  
Anemia during pregnancy in third trimester;  
Obesity affecting pregnancy in second trimester, unspecified   
obesity type   
   
                                                            GDM, class A2 (Prima  
ry Dx);  
Other obesity due to excess calories affecting pregnancy in third   
trimester;  
32 weeks gestation of pregnancy   
   
                                                    Start: 2024  
End: 2024     ambulatory          SHAYNA ESCALERA        Facility:[facility]  
   
                                Start: 2024 Refill          Bobbi KHAN RN.CNP  
Work Phone:   
[phone]                          OB/Gynecology  
   
                                        Comment on above:   Refill Request   
   
                                Start: 06- Telephone encounter Shayna cody MD  
Work Phone:   
[phone]                          OB/Gynecology  
   
                                        Comment on above:   FMLA Paperwork   
   
                                                    Start: 06-  
End: 06-     ambulatory          SHAYNA ESCALERA        Facility:[facility]  
   
                                                    Start: 06-  
End: 06-                         Patient encounter   
procedure                               Shayna Escalera MD  
Work Phone:   
[phone]                          OB/Gynecology  
   
                                        Comment on above:   30 weeks gestation o  
f pregnancy (Primary Dx);  
Diet controlled gestational diabetes mellitus (GDM) in third   
trimester;  
Encounter for supervision of normal first pregnancy in third   
trimester;  
GDM, class A2;  
Anemia during pregnancy in third trimester   
   
                                                    Start: 2024  
End: 2024           ambulatory                Sarah Cotey RN  
Work Phone:   
[phone]                          Diabetic Education   
[facility] [[email or web address]]  
   
                                        Comment on above:   link for book   
   
                                        Start: 2024   E-mail encounter fro  
m   
caregiver                               Sarah De La Cruz RN  
Work Phone:   
[phone]                          Diabetic Education   
[facility] [[email or web address]]  
   
                                                    Start: 2024  
End: 2024                         Nursing evaluation of   
patient and report                      Sarah De La Cruz RN  
Work Phone:   
[phone]                          Diabetic Education   
[facility] [[email or web address]]  
   
                                        Comment on above:   Gestational diabetes  
 mellitus (GDM) in third trimester,   
gestational   
diabetes method of control unspecified   
   
                                Start: 2024 Telephone encounter [address]  
Work Phone:   
[phone]                          Endocrinology  
   
                                                    Start: 2024  
End: 2024     ambulatory          BOBBI HAVEN         Facility:[facility]  
   
                                                    Start: 2024  
End: 2024                         Patient encounter   
procedure                               Keely KISHORE Chavo CARRASCO  
Work Phone:   
[phone]                          Nutrition Therapy  
   
                                        Comment on above:   Gestational diabetes  
 mellitus (GDM) in third trimester,   
gestational   
diabetes method of control unspecified   
   
                                                    Start: 2024  
End: 2024                         Telemedicine consultation   
with patient                            Keely NOVAK Chavo [facility]  
Work Phone:   
[phone]                          Nutrition Therapy  
   
                                Start: 2024 Telephone encounter Bobbi Ramires  
Work Phone:   
[phone]                          OB/Gynecology  
   
                                        Comment on above:   Results   
   
                                                    Start: 2024  
End: 2024     ambulatory          BOBBI JALLOH         Facility:[facility]  
   
                                Start: 2024 Telephone encounter Bobbi DuranCNP  
Work Phone:   
[phone]                          OB/Gynecology  
   
                                        Comment on above:   Results   
   
                                                    Start: 2024  
End: 2024     ambulatory          TIM CARMICHAEL   Facility:[facility]  
   
                                                    Start: 2024  
End: 2024                         Patient encounter   
procedure                               Letyt CENTENOC[facility]ISHORE  
Work Phone:   
[phone]                          OB/Gynecology  
   
                                        Comment on above:   Encounter for superv  
ision of normal first pregnancy in third   
trimester (Primary Dx);  
28 weeks gestation of pregnancy;  
Obesity affecting pregnancy in second trimester, unspecified   
obesity type;  
Need for vaccination;  
Heartburn during pregnancy in second trimester;  
Rh negative state in antepartum period;  
Depressive disorder   
   
                                                    Start: 2024  
End: 2024     ambulatory          JANETUZAIR LILLY     Facility:[facility]  
   
                                                    Start: 2024  
End: 2024     ambulatory          JANET LILLY     Facility:[facility]  
   
                                                    Start: 2024  
End: 2024                         Patient encounter   
procedure                               Janet Lilly   
FREDY.CNM  
Work Phone:   
[phone]                          OB/Gynecology  
   
                                        Comment on above:   25 weeks gestation o  
f pregnancy (Primary Dx);  
Encounter for supervision of normal first pregnancy in second   
trimester;  
Obesity affecting pregnancy in second trimester, unspecified   
obesity type;  
Depressive disorder;  
Depression affecting pregnancy in second trimester, antepartum   
   
                                                    Start: 2024  
End: 2024     ambulatory          TIM CARMICHAEL   Facility:[facility]  
   
                                                    Start: 2024  
End: 2024                         Patient encounter   
procedure                               Letty ARMANDOM  
Work Phone:   
[phone]                          OB/Gynecology  
   
                                        Comment on above:   Encounter for superv  
ision of normal first pregnancy in second   
trimester (Primary Dx);  
21 weeks gestation of pregnancy;  
Obesity affecting pregnancy in second trimester, unspecified   
obesity type;  
Depressive disorder;  
Heartburn during pregnancy in second trimester   
   
                                                            Encounter for fetal   
anatomic survey (Primary Dx);  
21 weeks gestation of pregnancy;  
Obesity affecting pregnancy in second trimester, unspecified   
obesity type   
   
                                                    Start: 2024  
End: 2024     ambulatory          SHAYNA ESCALERA        Facility:[facility]  
   
                                                    Start: 2024  
End: 2024     ambulatory          BOBBI JALLOH         Facility:[facility]  
   
                                                    Start: 2024  
End: 2024                         Patient encounter   
procedure                               Bobbi Jalloh APRN.CNP  
Work Phone:   
[phone]                          OB/Gynecology  
   
                                        Comment on above:   Encounter for superv  
ision of normal first pregnancy in second   
trimester (Primary Dx);  
17 weeks gestation of pregnancy;  
Obesity affecting pregnancy in second trimester, unspecified   
obesity type;  
Depression affecting pregnancy in second trimester, antepartum;  
Heartburn during pregnancy in second trimester   
   
                                Start: 2024 ambulatory      Dilcia White   
APRJENNIFER.CNP  
Work Phone:   
[phone]                          OB/Gynecology  
   
                                        Comment on above:   Type & Screen Lab Re  
sult   
   
                                                    Start: 2024  
End: 2024     ambulatory          [facility]       Facility:[facility]  
   
                                                    Start: 2024  
End: 2024                         Patient encounter   
procedure                               Shayna Escalera MD  
Work Phone:   
[phone]                          OB/Gynecology  
   
                                        Comment on above:   13 weeks gestation o  
f pregnancy (Primary Dx);  
Depressive disorder   
   
                                                            First trimester scre  
ening (Primary Dx);  
13 weeks gestation of pregnancy   
   
                                                    Start: 2024  
End: 2024                         Periodic preventive med   
est patient 18-39 yrs                   Farhad Angel   
APRN-CNP  
Work Phone:   
[phone]                           [facility]   
[facility]  
   
                                        Comment on above:   Depressive disorder   
(Primary Dx);  
Gastroesophageal reflux disease, unspecified whether esophagitis   
present   
   
                                                    Start: 2024  
End: 2024     ambulatory          [facility]  
   
Ambulatory  
   
                                                    Start: 2024  
End: 2024     ambulatory          [facility]       Facility:[facility]  
   
                                        Start: 2023   Office outpatient vi  
sit   
15 minutes                              Tim Carmichael  
Work Phone:   
[phone]                          [facility]   
[facility]  
Work Phone:   
[phone]  
   
                                Start: 2023 ambulatory      Ms. Farhad Angel                           Facility:[id number]  
   
                                                    Start: 2023  
End: 2023                         Patient encounter   
procedure                               Xenia Martines MD  
Work Phone:   
[phone]                          OB/Gynecology  
   
                                        Comment on above:   Encounter for gyneco  
logical examination (general) (routine)   
without   
abnormal findings (Primary Dx);  
Screening for cervical cancer;  
Encounter for screening for human papillomavirus (HPV);  
Cyst of Bartholin's gland   
   
                                                    Start: 2023  
End: 2023           Patient encounter status  Xenia Martines MD  
Work Phone:   
[phone]                          OB/Gynecology  
   
                                Start: 2023 Chart Update    iTm BAHENA Furn  
ess  
Work Phone:   
[phone]                          [facility]   
[facility]  
Work Phone:   
[phone]  
   
                                        Start: 2022   Office outpatient vi  
sit   
25 minutes                              Tim SHRUTI Furness  
Work Phone:   
[phone]                          [facility]   
[facility]  
Work Phone:   
[phone]  
   
                                Start: 2022 ambulatory      Tim lorenz   
[facility]                                 Facility:[id number]  
   
                                                    Start: 2021  
End: 2021                         Office outpatient visit   
15 minutes                              Renetta Pacheco DPM  
Work Phone:   
[phone]                          [facility]  
   
                                        Comment on above:   Right ankle pain, un  
specified chronicity (Primary Dx);  
Instability of right ankle joint;  
Ankle varus, acquired, right   
   
                                                    Start: 2020  
End: 2020                         Subsequent hospital visit   
by physician                            Moises Harley  
Work Phone:   
[phone]                          [facility]   
[facility]  
   
                                        Comment on above:   Arrived   
   
                                                    Start: 2020  
End: 2020                         Patient encounter   
procedure                               Moises Harley  
Work Phone:   
[phone]                          [facility]   
[facility]  
   
                                        Comment on above:   Closed fracture of r  
ight ankle with routine healing,   
subsequent   
encounter (Primary Dx)   
   
                                                    Start: 2020  
End: 2020                         Subsequent hospital visit   
by physician                            Moises Harley  
Work Phone:   
[phone]                          [facility]   
[facility]  
   
                                        Comment on above:   Arrived   
   
                                                    Start: 2020  
End: 2020                         Patient encounter   
procedure                               Moises Harley  
Work Phone:   
[phone]                          [facility]   
[facility]  
   
                                        Comment on above:   Closed right ankle f  
racture, initial encounter (Primary Dx)   
   
                                                    Start: 02-  
End: 02-                         Subsequent hospital visit   
by physician                            Moises Harley  
Work Phone:   
[phone]                          [facility]   
[facility]  
   
                                        Comment on above:   Arrived   
   
                                                    Start: 02-  
End: 02-                         Patient encounter   
procedure                               Moises Harley  
Work Phone:   
[phone]                          [facility]   
[facility]  
   
                                        Comment on above:   Closed right ankle f  
racture, initial encounter (Primary Dx)   
   
                                                    Start: 2020  
End: 2020                         Subsequent hospital visit   
by physician                            Moises Harley  
Work Phone:   
[phone]                          [facility]   
[facility]  
   
                                        Comment on above:   Arrived   
   
                                                    Start: 2020  
End: 2020                         Patient encounter   
procedure                               Moises Harley  
Work Phone:   
[phone]                          [facility]   
[facility]  
   
                                        Comment on above:   Right ankle pain, un  
specified chronicity (Primary Dx);  
Closed right ankle fracture, initial encounter   
   
                                                    Start: 2020  
End: 2020                         Subsequent hospital visit   
by physician                            Provider Not In   
System                                  [facility]   
[facility]  
   
                                        Comment on above:   Arrived   
   
                                                    Start: 2019  
End: 2019                         Patient encounter   
procedure                 [facility]   
Care  
   
                                                    Start: 2018  
End: 2018                         Patient encounter   
procedure                 Tim Carmichael         Facility:[facility]  
   
                                                    Start: 2018  
End: 2018                         Patient encounter   
procedure                 Tim Carmichael         Facility:[facility]  
   
                                                    Start: 2018  
End: 2018                         Patient encounter   
procedure                 Tim Carmichael         Facility:[facility]  
  
  
  
Procedures  
  
  
                          Date         Procedure    Procedure Detail Performing   
Clinician  
   
                          Start: 2024 URINE OB DIP B/O              Chelo Peters MD  
Work Phone:   
[phone]  
   
                                        Start: 2024   Us preg uterus after  
 1st   
trimest  gestation                             Bobbi Jalloh APRN.CNP  
Work Phone:   
[phone]  
   
                          Start: 2024 URINE OB DIP B/O              Jaylen Lilly APRN.CNM  
Work Phone:   
[phone]  
   
                          Start: 2024 URINE OB DIP B/O              Jaylen Lilly APRN.CNM  
Work Phone:   
[phone]  
   
                          Start: 07- URINE OB DIP B/O              Chelo Peters MD  
Work Phone:   
[phone]  
   
                          Start: 2024 URINE OB DIP B/O              Tamra Beaver APRN.CNM  
Work Phone:   
[phone]  
   
                          Start: 2024 URINE OB DIP B/O              Shayna viveros MD  
Work Phone:   
[phone]  
   
                          Start: 2024 URINE OB DIP B/O              Chelo Peters MD  
Work Phone:   
[phone]  
   
                          Start: 2024 URINE OB DIP B/O              Jaylen Lilly APRN.CNM  
Work Phone:   
[phone]  
   
                          Start: 2024 URINE OB DIP B/O              Tamra CENTENOCNKISHORE  
Work Phone:   
[phone]  
   
                                        Start: 2024   Us preg uterus after  
 1st   
trimest  gestation                             Letty CENTENOCNM  
Work Phone:   
[phone]  
   
                          Start: 06- URINE OB DIP B/O              Shayna viveros MD  
Work Phone:   
[phone]  
   
                          Start: 2024 Antibody screen              TIM CARMICHAEL  
   
                                        Comment on above:   Order Comment: Speci  
men Type: BLOOD SPECIMEN  
Ordering Facility: [facility]  
Address: [address] [place] [address]   
   
                                                            Performed By: #### 5  
[id number] ####  
[facility]  
CLIA [id number]  
[address] [place] [address]   
   
                          Start: 2024 Antibody screen              TIM CARMICHAEL  
   
                                        Comment on above:   Order Comment: Speci  
men Type: BLOOD SPECIMEN  
Ordering Facility: [facility]  
Address: [address] [place] [address]   
   
                                                            Performed By: #### 5  
[id number] ####  
[facility]  
CLIA [id number]  
[address] [place] [address]   
   
                                        Start: 2024   Us preg uterus after  
 1st   
trimest  gestation                             Shayna sEcalera MD  
Work Phone:   
[phone]  
   
                          Start: 2024 Antibody screen              TIM CARMICHAEL  
   
                                        Comment on above:   Order Comment: Speci  
men Type: BLOOD SPECIMEN  
Ordering Facility: [facility]  
Address: [address] [place] [address]   
   
                                                            Performed By: #### 5  
[id number] ####  
[facility]  
CLIA [id number]  
[address] [place] [address] OF [facility]   
   
                          Start: 2024 URINE OB DIP B/O              Shayna viveros MD  
Work Phone:   
[phone]  
   
                                        Start: 2024   Us fetal nuchal   
translucency 1st gestation                           Dilcia CENTENOCNP  
Work Phone:   
[phone]  
   
                                        Start: 2020   Radiographic imaging  
   
procedure                                           External Transcribed  
   
                                       Colonoscopy               Tim BAHENA Mariradha  
mikki  
Work Phone:   
[phone]  
   
                                       Extraction of wisdom tooth              P  
donny Carmichael  
Work Phone:   
[phone]  
   
                                                            Tonsillectomy and   
adenoidectomy                                       Tim Carmichael  
Work Phone:   
[phone]  
  
  
  
Plan of Treatment  
  
  
                          Date         Care Activity Detail       Author  
   
                                        Start: 2040   Zoster Vaccines (1 o  
f   
2)                        Zoster Vaccines (1 of 2)  [facility]  
   
                                        Start: 2034   Urine microalbumin   
profile                                 DTaP,Tdap,Td Vaccine (9   
- Td or Tdap)                           [facility]  
   
                                        Start: 2029   DTaP/Tdap/Td Vaccine  
s   
(8 - Td or Tdap)                        DTaP/Tdap/Td Vaccines (8   
- Td or Tdap)                           [facility]  
   
                                        Start: 2029   Urine microalbumin   
profile                                 DTaP,Tdap,Td Vaccine (8   
- Td or Tdap)                           [facility]  
   
                                        Start: 2028   Screening for   
malignant neoplasm of   
cervix                                              [facility]  
   
                          Start: 2027 HPV TESTING  HPV TESTING  [facility]  
   
                          Start: 2027 PAP TESTING  PAP TESTING  [facility]  
   
                                                    Start: 2025  
End: 2025                         Patient encounter   
procedure                               2025 8:00 AM EST   
Office Visit [facility]   
 [place] [id number] [phone] Farhad Angel, APRN-CNP   
 [place] [address]   
[phone] (Work)   
[phone] (Fax)                      [facility]  
   
                          Start: 2024 Influenza vaccination Influenza Vacc  
ine (#1) [facility]  
   
                                                    Start: 2024  
End: 2024                         Patient encounter   
procedure                                           OB/Gynecology  
   
                                        Comment on above:   NST   
   
                                                            OB   
   
                                                            NST only   
   
                                                    Start: 2024  
End: 2024                         Patient encounter   
procedure                                           OB/Gynecology  
   
                                        Comment on above:   NST/ OB   
   
                                                            OB   
   
                                                    Start: 2024  
End: 2024                         Patient encounter   
procedure                                           OB/Gynecology  
   
                                        Comment on above:   Growth/ ob   
   
                                                            OB   
   
                                                    Start: 2024  
End: 2024                         Patient encounter   
procedure                                           OB/Gynecology  
   
                                        Comment on above:   Growth/ ob   
   
                                                            OB   
   
                                                    Start: 2024  
End: 2024                         Patient encounter   
procedure                                           OB/Gynecology  
   
                                        Comment on above:   NST   
   
                                                            NST only   
   
                                                    Start: 2024  
End: 2024           ambulatory                2024 8:30 AM EDT   
Visit (SP) Office   
Hematology/Oncology [address] [place]   
[place] [address] [phone]   
2ND                                     Hematology/Oncology  
   
                                        Comment on above:   2ND   
   
                                                    Start: 2024  
End: 2024                         Patient encounter   
procedure                                           OB/Gynecology  
   
                                        Comment on above:   NST only   
   
                                                            IRON SUCROSE/D3-3/SA  
RA WISWELL ORDERING/AUTH EXP ?*   
   
                                                    Start: 07-  
End: 07-                         Patient encounter   
procedure                                           OB/Gynecology  
   
                                        Comment on above:   NST   
   
                                                            NST only   
   
                                                    Start: 2024  
End: 2024                         Patient encounter   
procedure                                           OB/Gynecology  
   
                                        Comment on above:   OB   
   
                                                            NST only   
   
                                                            NST   
   
                                                            IRON SUCROSE/D2-3/SA  
RA WISWELL ORDERING/AUTH EXP ?*   
   
                                                    Start: 2024  
End: 2024           Infusion Center           2024 8:30 AM EDT   
Infusion Center   
Hematology/Oncology [address]   
[place] [address] [phone]   
IRON SUCROSE/D1-3/SHAYNA   
WISWELL ORDERING/AUTH   
EXP ?*                                  Hematology/Oncology  
   
                                        Comment on above:   IRON SUCROSE/D1-3/SA  
RA WISWELL ORDERING/AUTH EXP ?*   
   
                                                    Start: 2024  
End: 10-                         Protein/Creatinine   
[Mass Ratio] in Urine                               [facility]  
Work Phone:   
[phone]  
   
                                        Comment on above:   Expected: 2024  
, Expires: 10/07/2024   
   
                                                    Start: 2024  
End: 2024                         Patient encounter   
procedure                                           OB/Gynecology  
   
                                        Comment on above:   NST   
   
                                                            NST/OB   
   
                                                    Start: 2024  
End: 2024                         Patient encounter   
procedure                                           OB/Gynecology  
   
                                        Comment on above:   NST only   
   
                                                    Start: 2024  
End: 2024                         Patient encounter   
procedure                                           OB/Gynecology  
   
                                        Comment on above:   NST   
   
                                                            NST only   
   
                                                    Start: 2024  
End: 2024                         Patient encounter   
procedure                                           OB/Gynecology  
   
                                        Comment on above:   NST   
   
                                                            NST only   
   
                                                    Start: 2024  
End: 2024                         Patient encounter   
procedure                                           OB/Gynecology  
   
                                        Comment on above:   Growth/ ob   
   
                                                            OB   
   
                                                    Start: 2024  
End: 2024           ambulatory                2024 8:15 AM EDT   
Results Only [facility]   
[address] [place] [address]   
[phone]                            [facility]   
Laboratory  
   
                                                    Start: 06-  
End: 2024                         CBC panel - Blood by   
Automated count                         COMPLETE BLOOD COUNT Lab   
Routine 30 weeks   
gestation of pregnancy   
Anemia during pregnancy   
in third trimester   
Expected: 06/10/2024,   
Expires: 2024                     [facility]  
   
                                        Comment on above:   Expected: 06/10/2024  
, Expires: 2024   
   
                                                    Start: 06-  
End: 2024                         Ferritin [Mass/volume]   
in Serum or Plasma                      [facility] Routine 30   
weeks gestation of   
pregnancy Anemia during   
pregnancy in third   
trimester Expected:   
06/10/2024, Expires:   
2024                              [facility]  
   
                                        Comment on above:   Expected: 06/10/2024  
, Expires: 2024   
   
                                                    Start: 06-  
End: 2024                         Iron and Iron binding   
capacity panel - Serum   
or Plasma                               [facility]   
Routine 30 weeks   
gestation of pregnancy   
Anemia during pregnancy   
in third trimester   
Expected: 06/10/2024,   
Expires: 2024                     [facility]  
   
                                        Comment on above:   Expected: 06/10/2024  
, Expires: 2024   
   
                                                    Start: 06-  
End: 06-                         Patient encounter   
procedure                               06/10/2024 1:40 PM EDT   
Routine Prenatal Office   
Visit OB/Gynecology [address]   
[place] [address] [phone]   
Shayna Escalera  [address]   
[address]   
[address] [phone]   
(Work) [phone]   
(Fax) Ob                                OB/Gynecology  
   
                                        Comment on above:   Ob   
   
                                                    Start: 06-  
End: 06-                         Nursing evaluation of   
patient and report                      06/10/2024 9:00 AM EDT   
Nurse Visit Diabetic   
Education [facility] [[email or web address] [address]   
[place] [address]   
[phone] [place] [address] [place] [address] [phone]   
(Work) Gestational   
Diabetes                                Diabetic Education   
[facility] [[email or web address]]  
   
                                        Comment on above:   Gestational Diabetes  
   
   
                                                    Start: 2024  
End: 2024                         CBC W Auto   
Differential panel -   
Blood                                   COMPLETE BLOOD COUNT AND   
DIFFERENTIAL Lab Routine   
25 weeks gestation of   
pregnancy Encounter for   
supervision of normal   
first pregnancy in   
second trimester   
Expected: 2024   
(Approximate), Expires:   
2024                              [facility]  
   
                                        Comment on above:   Expected: 2024  
 (Approximate), Expires: 2024   
   
                                                    Start: 2024  
End: 2024                         GESTATIONAL GLUCOSE   
SCREEN, 1-HOUR, 50   
GRAM, NON-FASTING                       GESTATIONAL GLUCOSE   
SCREEN, 1-HOUR, 50 GRAM,   
NON-FASTING Lab Routine   
25 weeks gestation of   
pregnancy Encounter for   
supervision of normal   
first pregnancy in   
second trimester   
Expected: 2024   
(Approximate), Expires:   
2024                              [facility]  
Work Phone:   
[phone]  
   
                                        Comment on above:   Expected: 2024  
 (Approximate), Expires: 2024   
   
                                                    Start: 2024  
End: 2024                         SYPHILIS TOTAL   
W/REFLEX                                SYPHILIS TOTAL W/REFLEX   
Lab Routine 25 weeks   
gestation of pregnancy   
Encounter for   
supervision of normal   
first pregnancy in   
second trimester   
Expected: 2024   
(Approximate), Expires:   
2024                              [facility]  
   
                                        Comment on above:   Expected: 2024  
 (Approximate), Expires: 2024   
   
                                                    Start: 2024  
End: 2024           TYPE + SCREEN PRENATAL    TYPE + SCREEN PRENATAL   
Blood Bank Routine 25   
weeks gestation of   
pregnancy Encounter for   
supervision of normal   
first pregnancy in   
second trimester   
Expected: 2024   
(Approximate), Expires:   
2024                              [facility]  
   
                                        Comment on above:   Expected: 2024  
 (Approximate), Expires: 2024   
   
                                                    Start: 2024  
End: 2024                         Patient encounter   
procedure                               2024 7:30 AM EDT   
[facility]   
Nutrition Therapy [id number]   
[address] [place] [address]   
[phone] [address]   
[address]   
[place]   
[address] [phone]   
(Work) [phone]   
(Fax) Gestational   
Diabetes                                Nutrition Therapy  
   
                                        Comment on above:   Gestational Diabetes  
   
   
                                                    Start: 2024  
End: 2024                         GEST GLUC MAX, 3-HR,   
100 GM, FASTING                         GEST GLUC MAX, 3-HR, 100   
GM, FASTING Lab Routine   
Elevated glucose   
tolerance test Expected:   
2024, Expires:   
2024                              [facility]  
Work Phone:   
[phone]  
   
                                        Comment on above:   Expected: 2024  
, Expires: 2024   
   
                                                    Start: 2024  
End: 2024                         Patient encounter   
procedure                               2024 10:20 AM EDT   
Office Visit Financial   
Clearance Phone   
Screening [facility]   
Finacial assitance   
consult                                 Financial Clearance   
Phone Screening  
   
                                        Comment on above:   Finacial assitance c  
onsult   
   
                                                    Start: 2024  
End: 2024                         Patient encounter   
procedure                               2024 11:15 AM EDT   
Routine Prenatal Office   
Visit OB/Gynecology [address]   
[address] [place] [address] [phone]   
Letty Beaver APRN.CNM   
[address]   
[place] [address]   
[phone] (Work)   
[phone] (Fax) OB                   OB/Gynecology  
   
                                        Comment on above:   OB   
   
                                                    Start: 2024  
End: 2024           ambulatory                2024 11:00 AM EDT   
Results Only Pablo Lucaswn [facility]   
[address] [place] [address]   
[phone] glucose +   
labs                                    [facility]   
Laboratory  
   
                                        Comment on above:   glucose + labs   
   
                                                    Start: 2024  
End: 2024     TYPE + SCREEN PRENATAL                     [facility]  
Work Phone:   
[phone]  
   
                                        Comment on above:   Expected: 2024  
, Expires: 2024   
   
                                                    Start: 2024  
End: 2025                         OBSTETRIC ULTRASOUND   
[facility]                                     OBSTETRIC ULTRASOUND [facility]   
Anc Imaging Routine   
Encounter for   
supervision of normal   
first pregnancy in   
second trimester 17   
weeks gestation of   
pregnancy Expected:   
2024, Expires:   
2025                              [facility]  
Work Phone:   
[phone]  
   
                                        Comment on above:   Expected: 2024  
, Expires: 2025   
   
                                                    Start: 2024  
End: 2025                         OBSTETRIC ULTRASOUND   
[facility]                                     OBSTETRIC ULTRASOUND [facility]   
Anc Imaging Routine 13   
weeks gestation of   
pregnancy Expected:   
2024, Expires:   
2025                              [facility]  
Work Phone:   
[phone]  
   
                                        Comment on above:   Expected: 2024  
, Expires: 2025   
   
                                        Start: 2024   EPV, Provider:   
Farhad Angel, Status:   
Pen, Time: 9:00 AM                      EPV, Provider:   
Farhad Angel, Status:   
Pen, Time: 9:00 AM                      [facility]   
[facility]  
Work Phone:   
[phone]  
   
                                        Start: 2024   Diabetes mellitus   
screening                 Diabetes Screening        [facility]  
   
                                        Start: 2023   EPV, Provider:   
Farhad Angel, Status:   
Pen, Time: 2:40 PM                      EPV, Provider:   
Farhad Angel, Status:   
Pen, Time: 2:40 PM                      [facility]   
[facility]  
Work Phone:   
[phone]  
   
                          Start: 2023 DEPRESSION ASSESSMENT DEPRESSION ASS  
ESSMENT [facility]  
   
                                        Start: 10-   COVID-19 VACCINE (2   
-   
Moderna series)                         COVID-19 VACCINE (2 -   
Moderna series)                         [facility]  
   
                          Start: 2022 Influenza vaccination INFLUENZA (#1)  
 [facility]  
   
                          Start: 2021 Influenza vaccination INFLUENZA VACC  
INE (#1) [facility]  
   
                                Start: 2020 Influenza vaccination INFLUENZ  
A VACCINE   
(Season Ended)                          [facility]  
   
                                                    Start: 04-  
End: 04-           Rehab Services Visit      04/15/2020 Rehab   
Services Visit Physical   
[place] [address]   
[phone] (Fax)   
Chase Nichole, PT [address] [place]   
[address] [phone] (Fax)                      [facility]   
Therapy [place]  
   
                                                    Start: 2020  
End: 2020           Rehab Services Visit      2020 Rehab   
Services Visit Physical   
[place] [address]   
[phone]   
[phone] (Fax)   
Chase Nichole, PT [address] [place]   
[address] [phone] (Fax)                      [facility]   
Therapy [place]  
   
                                                    Start: 04-  
End: 04-           Rehab Services Visit      04/10/2020 Rehab   
Services Visit Physical   
[place] [address]   
[phone] (Fax)   
Chase Nichole, PT [address] [place]   
[address] [phone] (Fax)                      [facility] Physical   
Therapy [place]  
   
                                                    Start: 2020  
End: 2020           Rehab Services Visit      2020 Rehab   
Services Visit Physical   
[place] [address]   
[phone] (Fax)   
Chase Nichole, PT [address] [place]   
[address] [phone] (Fax)                      [facility] Physical   
Therapy [place]  
   
                                                    Start: 2020  
End: 2020           Office Visit              2020 Office Visit   
Orthopaedics [address]  [facility] [place] [address]   
[phone] (Fax)                      [facility]   
Orthopedics & Sports   
Medicine  
   
                                                    Start: 2020  
End: 2020           Rehab Services Visit      2020 Rehab   
Services Visit Physical   
Therapy [address]  [address] [address]   
[phone] (Fax)   
Chase Nichole, PT [address]   
[place]   
[id number] [phone] (Fax)                      [facility] Physical   
Therapy [place]  
   
                                                    Start: 2020  
End: 2021     X-ray of right ankle                     [facility]  
   
                                        Comment on above:   1 Occurrences starti  
ng 2020 until 2020   
   
                                                            Expected: 2020  
, Expires: 2021   
   
                                                    Start: 2020  
End: 2020           Office Visit              2020 Office Visit   
Orthopaedics [place] [address]   
[phone] (Fax)                      [facility]   
Orthopedics & Sports   
Medicine  
   
                                                    Start: 02-  
End: 02-     X-ray of right ankle                     [facility]  
   
                                        Comment on above:   Expected: 02/10/2020  
, Expires: 02/10/2021   
   
                                                            1 Occurrences starti  
ng 02/10/2020 until 02/10/2020   
   
                                                    Start: 2020  
End: 2020     X-ray of right ankle                     [facility]  
   
                                        Comment on above:   1 Occurrences starti  
ng 2020 until 2020   
   
                                                            Expected: 2020  
, Expires: 2020   
   
                          Start: 2019 Influenza vaccination INFLUENZA VACC  
INE (#1) [facility]  
   
                                        Start: 2011   Screening for   
malignant neoplasm of   
cervix                                              [facility]  
   
                                        Start: 2009   Third diphtheria,   
tetanus and acellular   
pertussis (DTaP)   
vaccination               TDAP (ADULT)              [facility]  
   
                                        Start: 2009   Urine microalbumin   
profile                   DTAP,TDAP,TD (1 - Tdap)   [facility]  
   
                          Start: 2008 Anxiety Screening Anxiety Screening   
[facility]  
   
                          Start: 2008 Hepatitis C screening Hepatitis C [facility]  
   
                          Start: 2008 Tetanus vaccination TETANUS      [facility]  
   
                          Start: 2005 HIV screening HIV SCREENING DISCUSSI  
ON [facility]  
   
                          Start: 01- HIV screening HIV SCREENING DISCUSSI  
ON [facility]  
   
                          Start: 2002 COVID-19 VACCINE (1) COVID-19 VACCIN  
E (1) [facility]  
   
                                        Start: 1993   History and physical  
   
examination, annual   
for health maintenance    Wellness Visit            [facility]  
   
                                        Start: 1990   HEPATITIS B (1 of 3   
-   
3-dose series)                          HEPATITIS B (1 of 3 -   
3-dose series)                          [facility]  
   
                                        Start: 1990   Hepatitis C antibody  
,   
confirmatory test                       HEPATITIS C VIRUS   
SCREENING                               [facility]  
   
                          Start: 1990 HIV screening HIV Screening [facility]  
   
                          Start: 1990 Lipid panel  Lipid Panel  [facility]  
   
                                        Start: 1990   Screening for   
malignant neoplasm of   
cervix                    PAP SMEAR                 [facility]  
   
                          Start: 1990 Tetanus vaccination TETANUS EVERY 10  
 YR [facility]  
   
                          Start: 1990 Yearly Adult Physical Yearly Adult P  
hysical [facility]  
   
                                                Fetal nonstress test FETAL NON-S  
TRESS TEST   
Procedures Routine 28   
weeks gestation of   
pregnancy Obesity   
affecting pregnancy in   
second trimester,   
unspecified obesity type   
Ordered: 2024                     [facility]  
   
                                        Comment on above:   Ordered: 2024   
   
                                                      
End: 2024           Fetal nonstress test      FETAL NON-STRESS TEST   
Procedures Routine 30   
weeks gestation of   
pregnancy Encounter for   
supervision of normal   
first pregnancy in third   
trimester GDM, class A2   
2x per week for 10   
Occurrences starting   
06/10/2024 until   
2024                              [facility]  
Work Phone:   
[phone]  
   
                                        Comment on above:   2x per week for 10 O  
ccurrences starting 06/10/2024 until   
2024   
   
                                                            Hepatic function 200  
0   
panel - Serum or   
Plasma                                  HEPATIC FUNCTION [facility] Lab   
Routine 34 weeks   
gestation of pregnancy   
Elevated blood pressure   
reading without   
diagnosis of   
hypertension 2024   
10:37 AM EDT                            [facility]  
   
                                                      
End: 2024                         OBSTETRIC ULTRASOUND   
[facility]                                     OBSTETRIC ULTRASOUND [facility]   
Anc Imaging Routine 28   
weeks gestation of   
pregnancy Obesity   
affecting pregnancy in   
second trimester,   
unspecified obesity type   
Once per month for 3   
Occurrences starting   
2024 until   
2024                              [facility]  
Work Phone:   
[phone]  
   
                                        Comment on above:   Once per month for 3  
 Occurrences starting 2024 until   
2024   
   
                                                      
End: 09-                         OBSTETRIC ULTRASOUND   
[facility]                                     OBSTETRIC ULTRASOUND [facility]   
Anc Imaging Routine   
Gestational diabetes   
mellitus (GDM) in third   
trimester, gestational   
diabetes method of   
control unspecified Once   
per month for 5   
Occurrences starting   
2024 until   
2024                              [facility]  
Work Phone:   
[phone]  
   
                                        Comment on above:   Once per month for 5  
 Occurrences starting 2024 until   
2024   
   
                                                            PAP FLUID CERVICAL   
SCREENING                               PAP FLUID CERVICAL   
SCREENING Lab Routine   
Screening for cervical   
cancer Encounter for   
screening for human   
papillomavirus (HPV)   
2023 10:12 AM EST                 [facility]  
Work Phone:   
[phone]  
   
                                                            ROUTINE, PRENATAL   
GROUP B STREP PCR                       ROUTINE, PRENATAL GROUP   
B STREP PCR Microbiology   
Routine Encounter for   
supervision of high risk   
pregnancy in third   
trimester, antepartum 35   
weeks gestation of   
pregnancy Other obesity   
due to excess calories   
affecting pregnancy in   
third trimester Insulin   
controlled gestational   
diabetes mellitus (GDM)   
in third trimester   
07/15/2024 10:15 AM EDT                 [facility]  
Work Phone:   
[phone]  
   
                                                URINE OB DIP B/O URINE OB DIP B/  
O Lab   
Routine Encounter for   
supervision of normal   
first pregnancy in   
second trimester 17   
weeks gestation of   
pregnancy Obesity   
affecting pregnancy in   
second trimester,   
unspecified obesity type   
Ordered: 2024                     [facility]  
Work Phone:   
[phone]  
   
                                        Comment on above:   Ordered: 2024   
   
                                       X-ray of right ankle              [facility]  
   
                                                                 [facility]  
c  
   
                                                                 [facility]  
c  
  
  
  
Immunizations  
  
  
                      Immunization Date Immunization Notes      Care Provider Constantine don  
   
                                        2024          RHO(D) immune globul  
in-   
IV or IM                                            Letty Beaver   
APRN.CNM  
Work Phone:   
[phone]                          [facility]  
   
                                        2024          tetanus toxoid, redu  
carolin   
diphtheria toxoid, and   
acellular pertussis   
vaccine, adsorbed                                   Letty Beaver   
APRN.CNM  
Work Phone:   
[phone]                          [facility]  
   
                                        10-          influenza, injectabl  
e,   
quadrivalent,   
preservative free                                   Farhad Angel   
APRN-CNP  
Work Phone:   
[phone]                          [facility]  
Work Phone:   
[phone]  
   
                                        10-          influenza virus vacc  
ine,   
unspecified formulation                             Janet Lilly   
APRN.CNM  
Work Phone:   
[phone]                          [facility]  
   
                                        10-          Moderna COVID-19   
vaccine, Fall , 12   
yeasrs and older   
(50mcg/0.5mL)                                       Farhad Angel   
APRN-CNP  
Work Phone:   
[phone]                          [facility]  
Work Phone:   
[phone]  
   
                                        10-          Moderna COVID-19 Biv  
al   
Booster 50 MCG/0.5ML   
Intramuscular Suspension                            [facility]  
Work Phone:   
[phone]                          [facility]   
[facility]  
Work Phone:   
[phone]  
   
                                        2022          Moderna COVID-19 Vac  
cine   
100 MCG/0.5ML   
Intramuscular Suspension                            [email or web address]  
Work Phone:   
[phone]                          -Medical   
[facility]  
Work Phone:   
[phone]  
   
                                        2021          typhoid VI capsular   
polysaccharide vaccine                              Farhad Angel   
APRN-CNP  
Work Phone:   
[phone]                          [facility]  
Work Phone:   
[phone]  
   
                                        10-          influenza, injectabl  
e,   
quadrivalent, contains   
preservative                                        Farhad Angel   
APRN-CNP  
Work Phone:   
[phone]                          [facility]  
Work Phone:   
[phone]  
   
                          01-   anthrax vaccine              Farhad Morales  
d   
APRN-CNP  
Work Phone:   
[phone]                          [facility]  
Work Phone:   
[phone]  
   
                          2019   anthrax vaccine              Farhad Morales  
d   
APRN-CNP  
Work Phone:   
[phone]                          [facility]  
Work Phone:   
[phone]  
   
                                        2019          influenza, injectabl  
e,   
quadrivalent,   
preservative free                                   Farhad Angel   
APRN-CNP  
Work Phone:   
[phone]                          [facility]  
Work Phone:   
[phone]  
   
                                        2019          meningococcal   
polysaccharide (groups   
A, C, Y and W-135)   
diphtheria toxoid   
conjugate vaccine   
(MCV4P)                                             Farhad Angel   
APRN-CNP  
Work Phone:   
[phone]                          [facility]  
Work Phone:   
[phone]  
   
                                        2019          tetanus toxoid, redu  
carolin   
diphtheria toxoid, and   
acellular pertussis   
vaccine, adsorbed                                   Farhad Angel   
APRN-CNP  
Work Phone:   
[phone]                          [facility]  
Work Phone:   
[phone]  
   
                                        2019          typhoid VI capsular   
polysaccharide vaccine                              Farhad Angel   
APRN-CNP  
Work Phone:   
[phone]                          [facility]  
Work Phone:   
[phone]  
   
                                        10-          influenza virus vacc  
ine,   
unspecified formulation                     Provider System     [facility]  
   
                                        10-          influenza, injectabl  
e,   
quadrivalent, contains   
preservative                                        Farhad Angel   
APRN-CNP  
Work Phone:   
[phone]                          [facility]  
Work Phone:   
[phone]  
   
                                        2017          Influenza, injectabl  
e,   
Madin Sheri Canine   
Kidney, preservative   
free, quadrivalent                                  Farhad Angel   
APRN-CNP  
Work Phone:   
[phone]                          [facility]  
Work Phone:   
[phone]  
   
                                        2016          typhoid VI capsular   
polysaccharide vaccine                              Farhad Angel   
APRN-CNP  
Work Phone:   
[phone]                          [facility]  
Work Phone:   
[phone]  
   
                                        10-          influenza virus vacc  
ine,   
unspecified formulation                     Provider System     [facility]  
   
                                        10-          influenza virus vacc  
ine,   
unspecified formulation                             Farhad Angel   
APRN-CNP  
Work Phone:   
[phone]                          [facility]  
Work Phone:   
[phone]  
   
                          2015   yellow fever vaccine              Katie arauz Ángel   
APRN-CNP  
Work Phone:   
[phone]                          [facility]  
Work Phone:   
[phone]  
   
                                        2014          typhoid VI capsular   
polysaccharide vaccine                              Farhad Angel   
APRN-CNP  
Work Phone:   
[phone]                          [facility]  
Work Phone:   
[phone]  
   
                                        10-          influenza, seasonal,  
   
injectable, preservative   
free                                                Farhad Angel   
APRN-CNP  
Work Phone:   
[phone]                          [facility]  
Work Phone:   
[phone]  
   
                                        2014          measles, mumps and   
rubella virus vaccine                               Farhad Wood   
APRN-CNP  
Work Phone:   
[phone]                          [facility]  
Work Phone:   
[phone]  
   
                                        2014          meningococcal ACWY   
vaccine, unspecified   
formulation                                         Farhad Angel APRN-[facility]  
Work Phone:   
[phone]                          [facility]  
Work Phone:   
[phone]  
   
                                        2013          influenza, seasonal,  
   
injectable, preservative   
free                                                Farhad Angel APRN-[facility]  
Work Phone:   
[phone]                          [facility]  
Work Phone:   
[phone]  
   
                                        2013          hepatitis B vaccine,  
   
adult dosage                                        Farhad Angel   
APRN-[facility]  
Work Phone:   
[phone]                          [facility]  
Work Phone:   
[phone]  
   
                                        10-          hepatitis B vaccine,  
   
adult dosage                                        Farhad Angel   
APRN-[facility]  
Work Phone:   
[phone]                          [facility]  
Work Phone:   
[phone]  
   
                          10-   varicella virus vaccine              Vict  
ramesh Angel   
APRN-CNP  
Work Phone:   
[phone]                          [facility]  
Work Phone:   
[phone]  
   
                                        2012          influenza virus vacc  
ine,   
live, attenuated, for   
intranasal use                                      Farhad [facility]  
Work Phone:   
[phone]                          [facility]  
Work Phone:   
[phone]  
   
                                        09-          hepatitis B vaccine,  
   
adult dosage                                        Farhad Wood   
APRN-[facility]  
Work Phone:   
[phone]                          [facility]  
Work Phone:   
[phone]  
   
                          09-   varicella virus vaccine              Vict  
jesúsa Wood   
APRN-CNP  
Work Phone:   
[phone]                          [facility]  
Work Phone:   
[phone]  
   
                                        10-          novel Influenza-H1N1  
-09,   
live virus for nasal   
administration                                      Timtanja Carmichael  
Work Phone:   
[phone]                          [employer]   
[facility]  
Work Phone:   
[phone]  
   
                                        04-          tetanus toxoid, redu  
carolin   
diphtheria toxoid, and   
acellular pertussis   
vaccine, adsorbed                                   Tim Carmichael  
Work Phone:   
[phone]                          [employer]   
[facility]  
Work Phone:   
[phone]  
   
                                        2009          hepatitis A vaccine,  
   
adult dosage                                        Farhad Angel   
APRN-CNP  
Work Phone:   
[phone]                          [facility]  
Work Phone:   
[phone]  
   
                                        2009          hepatitis A vaccine,  
   
unspecified formulation                             Tim T Furness  
Work Phone:   
[phone]                          [facility]  
Work Phone:   
[phone]  
   
                                        2008          hepatitis A vaccine,  
   
adult dosage                                        Farhad Angel   
APRN-CNP  
Work Phone:   
[phone]                          [facility]  
Work Phone:   
[phone]  
   
                                        2008          hepatitis A vaccine,  
   
unspecified formulation                             Tim T Furness  
Work Phone:   
[phone]                          [facility]  
Work Phone:   
[phone]  
   
                                        07-          HPV, unspecified   
formulation                                         Tim T Furness  
Work Phone:   
[phone]                          [facility]  
Work Phone:   
[phone]  
   
                                        2008          HPV, unspecified   
formulation                                         Tim T Furness  
Work Phone:   
[phone]                          [facility]  
Work Phone:   
[phone]  
   
                                        01-          HPV, unspecified   
formulation                                         Tim T Furness  
Work Phone:   
[phone]                          [facility]  
Work Phone:   
[phone]  
   
                                        04-          meningococcal   
polysaccharide vaccine   
(MPSV4)                                             Tim T Furness  
Work Phone:   
[phone]                          [facility]  
Work Phone:   
[phone]  
   
                          04-   varicella virus vaccine              Patr  
ick T Mariess  
Work Phone:   
[phone]                          [facility]  
Work Phone:   
[phone]  
   
                                        2003          hepatitis B vaccine,  
   
pediatric or   
pediatric/adolescent   
dosage                                              Tim T Furness  
Work Phone:   
[phone]                          [facility]  
Work Phone:   
[phone]  
   
                                        2003          hepatitis B vaccine,  
   
pediatric or   
pediatric/adolescent   
dosage                                              Tim T Furness  
Work Phone:   
[phone]                          [facility]  
Work Phone:   
[phone]  
   
                                        2003          hepatitis B vaccine,  
   
pediatric or   
pediatric/adolescent   
dosage                                              Tim aCrmichael  
Work Phone:   
[phone]                          [facility]  
Work Phone:   
[phone]  
   
                                        2003          TD(adult) unspecifie  
d   
formulation                                         Tim Carmichael  
Work Phone:   
[phone]                          [facility]  
Work Phone:   
[phone]  
   
                                        2002          measles, mumps and   
rubella virus vaccine                               Tim Carmichael  
Work Phone:   
[phone]                          [facility]  
Work Phone:   
[phone]  
   
                                        1995          diphtheria, tetanus   
toxoids and acellular   
pertussis vaccine,   
unspecified formulation                             Tim Carmichael  
Work Phone:   
[phone]                          [facility]  
Work Phone:   
[phone]  
   
                                        1995          poliovirus vaccine,   
inactivated                                         Tim Carmichael  
Work Phone:   
[phone]                          [facility]  
Work Phone:   
[phone]  
   
                                        1995          poliovirus vaccine,   
unspecified formulation                             Farhad DANIELMiguelSANDHYA  
Work Phone:   
[phone]                          [facility]  
Work Phone:   
[phone]  
   
                                        1991          diphtheria, tetanus   
toxoids and acellular   
pertussis vaccine,   
unspecified formulation                             Tim Carmichael  
Work Phone:   
[phone]                          [facility]  
Work Phone:   
[phone]  
   
                                        1991          poliovirus vaccine,   
inactivated                                         Tim Carmichael  
Work Phone:   
[phone]                          [facility]  
Work Phone:   
[phone]  
   
                                        1991          measles, mumps and   
rubella virus vaccine                               Timtanja Carmichael  
Work Phone:   
[phone]                          [facility]  
Work Phone:   
[phone]  
   
                                        1991          haemophilus influenz  
ae   
type b vaccine,   
conjugate unspecified   
formulation                                         Tim Carmichael  
Work Phone:   
[phone]                          [facility]  
Work Phone:   
[phone]  
   
                                        1990          haemophilus influenz  
ae   
type b vaccine,   
conjugate unspecified   
formulation                                         Tim T Furness  
Work Phone:   
[phone]                          [facility]  
Work Phone:   
[phone]  
   
                                        1990          haemophilus influenz  
ae   
type b vaccine,   
conjugate unspecified   
formulation                                         Tim T Furness  
Work Phone:   
[phone]                          [facility]  
Work Phone:   
[phone]  
   
                                        1990          diphtheria, tetanus   
toxoids and acellular   
pertussis vaccine,   
unspecified formulation                             Tim T Furness  
Work Phone:   
[phone]                          [facility]  
Work Phone:   
[phone]  
   
                                        1990          diphtheria, tetanus   
toxoids and acellular   
pertussis vaccine,   
unspecified formulation                             Tim T Furness  
Work Phone:   
[phone]                          [facility]  
Work Phone:   
[phone]  
   
                                        1990          poliovirus vaccine,   
inactivated                                         Tim T Furness  
Work Phone:   
[phone]                          [facility]  
Work Phone:   
[phone]  
   
                                        1990          diphtheria, tetanus   
toxoids and acellular   
pertussis vaccine,   
unspecified formulation                             Tim T Furness  
Work Phone:   
[phone]                          [facility]  
Work Phone:   
[phone]  
   
                                        1990          poliovirus vaccine,   
inactivated                                         Tim T Furness  
Work Phone:   
[phone]                          [facility]  
Work Phone:   
[phone]  
  
  
  
Payers  
  
  
                          Date         Payer Category Payer        Policy ID  
   
                          2022   Unknown                     
   
                          2022   Unknown                   [id number]  
   
                          2022   Unknown                   [id number]  
   
                                        2020          [employer]  
e   
( and others)                    [facility] [id number]   
2020-Present [address]   
[address] [place] [address]                  [id number]   
[id number]  
[id number]  
   
                                        2018          [employer]  
e   
( and others)                                  
   
                                        2018          [employer]  
e   
( and others)                                [id number]   
[id number]  
[id number]  
   
                          2018   Unknown                   [id number]  
   
                          1990   Unknown                   [id number]   
[id number]  
[id number]  
   
                          1990   Unknown                   [id number]   
[id number]  
[id number]  
   
                          1990   Unknown                   [id number]   
[id number]  
[id number]  
   
                          1990   Unknown                   [id number]   
[id number]  
[id number]  
   
                          1990   Unknown                   [id number]   
[id number]  
[id number]  
   
                          1990   Unknown                   [id number]   
[id number]  
[id number]  
   
                          1990   Unknown                   [id number]   
[id number]  
[id number]  
   
                          1990   Unknown                   [id number]   
[id number]  
[id number]  
  
  
  
Social History  
  
  
                          Date         Type         Detail       Facility  
   
                                                    Start: 2020  
End: 2024                         Tobacco smoking status   
NHIS                      Never smoker              [facility]  
   
                                                    Start: 2020  
End: 2024           Alcohol intake            Lifetime non-drinker   
(finding)                               [facility]  
   
                                                    Start: 2020  
End: 2021                         History SDOH Alcohol   
Frequency                 1                         [facility]  
   
                          Start: 1990 Sex Assigned At Birth Not on file  [facility]  
   
                                                            Exposure to SARS-CoV  
-2   
(event)                   Unable to assess          [facility]  
   
                                                    Start: 2020  
End: 2024                         Tobacco use and   
exposure                  Never used                [employer] [facility]  
   
                                                    Start: 2024  
End: 2024     No alcohol use      No alcohol use      [facility]   
[facility]  
Work Phone:   
[phone]  
   
                                                    Start: 2023  
End: 2024           Alcohol intake            Current non-drinker of   
alcohol (finding)                       [facility]  
   
                                                    Start: 2020  
End: 2024           Tobacco Comment           patient is exposed to   
second hand cigarette   
smoke in her home                       [facility]  
   
                                                    Start: 2024  
End: 2024     Tobacco use panel                       [facility]  
Work Phone:   
[phone]  
   
                                                    Start: 2024  
End: 2024                         Exposure to SARS-CoV-2   
(event)                   Not sure                  [facility]  
   
                                       History of tobacco use Passive smoker [facility]  
   
                                                            National Score   
(1-100), lower number   
is lower risk             51                        [facility]  
   
                          Start: 2023              Pregnancy    [facility]  
   
                                                            The thought of hussain pastrana   
myself has occurred to   
me                        Never                     [facility]  
  
  
  
Medical Equipment  
  
  
                                Procedure Code  Equipment Code  Equipment Origin  
al   
Text                      Equipment Identifier      Dates  
   
                                                                Use with blood   
glucose test four   
times a day.              [id number]                Start:   
2024  
   
                                                                Use as directed   
to   
check glucose levels   
up to seven times   
daily.                    [id number]                Start:   
2024  
   
                                                                Use as directed   
to   
check glucose levels   
up to seven times   
daily.                    [id number]                Start:   
2024  
End: 2024  
   
                                            1 Each once daily. [id number] Start:  
   
06-  
  
  
  
Goals  
  
  
                                Date            Patient Goal    Desired Activity  
/State  
   
                                                Personal health goal   
  
  
  
Clinical Notes 2021 to 2024  
  Telephone Encounter - Natasha Burleson RN - 2024 4:03 PM EDTTelephone   
Encounter - Natasha Burleson RN - 2024 4:03 PM EDTTelephone Encounter - 
Natasha Burleson RN - 2024 3:25 PM EDT  
  
                                Note Date & Type Note            Facility  
   
                                                    2024 Telephone   
encounter Note                          Formatting of this note might   
be different from the original.  
Patient spoke to Dr. Peters   
regarding induction of labor.   
She is now scheduled for   
induction 24 at 7pm. She   
is coming in  to sign   
consent. Consent in nurse   
triage room.  
Natasha Burleson RN  
  
Electronically signed by   
Natasha Burleson RN at   
2024 4:09 PM EDT  
                                        [facility]  
   
                                                    2024 Miscellaneous   
Notes                                   Formatting of this note might   
be different from the original.  
Patient spoke to Dr. Peters   
regarding induction of labor.   
She is now scheduled for   
induction 24 at 7pm. She   
is coming in  to sign   
consent. Consent in nurse   
triage room.  
Natasha Burleson RN  
  
Electronically signed by   
Natasha Burleson RN at   
2024 4:09 PM EDT  
Formatting of this note might   
be different from the original.  
Patient returned call and   
transferred call to  to speak   
with directly.  
Natasha Burleson RN  
  
Electronically signed by   
Natasha Burleson RN at   
2024 3:25 PM EDT  
Formatting of this note might   
be different from the original.  
Called to speak with patient.   
MFM recommending IOL early.   
Left Vm to call office  
Electronically signed by   
Narda Peters MD at   
2024 1:14 PM EDT  
documented in this encounter            [facility]  
   
                                                    2024 Telephone   
encounter Note                          Formatting of this note might   
be different from the original.  
Patient returned call and   
transferred call to  to speak   
with directly.  
Natasha Burleson RN  
  
Electronically signed by   
Natasha Burleson RN at   
2024 3:25 PM EDT  
                                        [facility]  
   
                                                    2024 Telephone   
encounter Note                          Formatting of this note might   
be different from the original.  
Called to speak with patient.   
MFM recommending IOL early.   
Left Vm to call office  
Electronically signed by   
Narda Peters MD at   
2024 1:14 PM EDT  
                                        [facility]  
   
                                        2024 Note       
Indication  
Evaluation of fetal growth,   
Evaluation of fetal well-being  
Maternal obesity, BMI >30,   
Advanced maternal age,   
Gestational diabetes - insulin  
dependent  
  
Impression  
REMOTE READ  
- Single, live, intrauterine   
pregnancy.  
- The fetal biometry is   
accelerated for the assigned   
gestational dating.  
- The EFW is 3751 g, at the   
98%. AC is at the >99%.  
- The amniotic fluid volume is   
mild polyhydramnios with an MVP   
of 7.4 cm and an  
CHRIS of 26.6 cm  
- The placenta is anterior,   
fundal.  
- BPP .  
- No malformations visualized   
on a limited survey as detailed   
below.  
  
Ultrasound Consultation  
Polyhydramnios was identified.   
Polyhydramnios is typically   
defined by an amniotic  
fluid index > 24 cm or a MVP >   
8 cm. The leading diagnosis for   
increased fluid in  
the  
third trimester is idiopathic   
(i.e. normal variant). The   
differential diagnosis  
also includes maternal   
conditions such as diabetes   
mellitus. Rare causes include  
fetal hydrops,  
absence of fetal swallowing   
which can be secondary to   
neurologic conditions or GI  
obstruction such as esophageal   
atresia or duodenal atresia.   
There is no evidence  
of any  
structural abnormalities on   
ultrasound. With any ultrasound   
there is potential for  
non-visualized fetal   
malformations.  
  
Recommendations  
Recommend twice weekly   
 testing - BPP   
alternate with NST  
Consider early term delivery   
due to LGA, polyhydramnios, GDM   
on insulin  
  
Maternal Assessment  
Height 168 cm  
Height (ft) 5 ft  
Height (in) 6 in  
Physical Exam  
Initial weight (lb) 218 lb  
Initial BMI 35.19 kg/m  
Maternal assessment other:   
 1 Para 0  
  
Method  
Transabdominal ultrasound   
examination. View: Suboptimal   
view: limited by late  
gestational age  
  
Pregnancy  
Conner pregnancy. Number of   
fetuses: 1  
  
Dating  
LMP on: 2023  
GA by LMP 36 w + 5 d  
LAURYN by LMP: 2024  
GA by prior assessment 36 w + 5   
d  
LAURYN by prior assessment:   
2024  
Ultrasound examination on:   
2024  
GA by U/S based upon: AC, BPD,   
Femur  
GA by U/S 38 w + 0 d  
LAURYN by U/S: 2024  
Assigned: based on stated LAURYN,   
selected on 2024  
Assigned GA 36 w + 5 d  
Assigned LAURYN: 2024  
  
General Evaluation  
Cardiac activity present. FHR   
140 bpm. Fetal movements:   
present. Presentation:  
cephalic  
Placenta: Placental site:   
anterior, fundal  
Umbilical cord: Cord vessels: 3   
vessel cord  
Amniotic fluid: Amount of AF:   
mild polyhydramnios, MVP 7.4   
cm. CHRIS 26.6 cm. Q1 7.4  
cm, Q2 6.7 cm, Q3 5.3 cm, Q4   
7.2 cm  
  
Biophysical Profile  
2: Fetal breathing movements  
2: Gross body movements  
2: Fetal tone  
2: Amniotic fluid volume  
 Biophysical profile score  
  
Fetal Growth Overview  
Exam date GA BPD (mm) HC (mm)    
AC (mm)  
FL (mm) HL (mm) EFW (g)  
2024 21w 2d 50.3 46% 193.6   
59%  195.1  
99% 35.7 67% 35.5 79% 531 97%  
2024 32w 2d 85.8 94%   
323.3 95%  322.5  
>99% 62.8 69% 2609 99%  
2024 36w 5d 93.4 90%   
350.3 93% 365.9  
>99% 69.9 57% 3751 98%  
  
Fetal Biometry  
Standard  
BPD 93.4 mm 38w 0d 90% Hadlock  
.8 mm -/- 97% Nicolaides  
.3 mm -/- 93% Lidia  
.9 mm 40w 4d >99% Hadlock  
Femur 69.9 mm 35w 3d 57% Lidia  
EFW 3,751 g -/- 98% Hadlock  
EFW (lb) 8 lb  
EFW (oz) 4 oz  
EFW by: Hadlock (HC-AC-FL)  
Extended  
 5.5 mm  
Extremities / Bony Struc  
FL / HC 0.20  
Other Structures  
 bpm  
  
Fetal Anatomy  
Lateral ventricles: normal  
Cavum septi pellucidi: normal  
Cerebellum: normal  
Cisterna magna: normal  
4-chamber view: suboptimal  
RVOT view: normal  
LVOT view: normal  
3-vessel view: normal  
Heart / Thorax  
Situs: situs solitus (normal)  
Diaphragm: normal  
Stomach: normal  
Kidneys: normal  
Bladder: normal  
Gender: Unspecified  
Wants to know fetal sex: no  
  
Performed By: Maisha Iqbal, SHYANN, RVT  
  
Read By: Krys Ledesma M.D.            [facility]  
   
                                        2024 Progress note Formatting of t  
his note might   
be different from the original.  
S: Karina Lindsay is a 34 year old   
female who presents at   
2024, Alternate LAURYN Entry   
for a routine visit. Denies   
headache, visual changes, chest   
pain, shortness of breath,   
vaginal bleeding, leakage of   
fluid, or dysuria. Feeling   
well, no complaints. Good   
movement, No contractions  
Feels better with insulin at 14   
units. PP have been stable.   
Fasting hovers around 95  
  
O: See flow sheet  
Gen: No apparent distress  
Abd: Gravid, nontender  
  
CHRIS 27  
BPP8/8  
EFW 98%  
Discussed possible elective c/s   
for EFW and GDMA2  
  
ASSESSMENT/PLAN:  
1. 36 weeks gestation of   
pregnancy - ICD9: V22.2, ICD10:   
Z3A.36 (primary diagnosis)  
- URINE OB DIP B/O  
  
2. Encounter for supervision of   
high risk pregnancy in third   
trimester, antepartum - ICD9:   
V23.9, ICD10: O09.93  
- URINE OB DIP B/O  
  
3. Insulin controlled   
gestational diabetes mellitus   
(GDM) in third trimester -   
ICD9: 648.83, ICD10: O24.414  
- URINE OB DIP B/O  
  
4. Other obesity due to excess   
calories affecting pregnancy in   
third trimester - ICD9: 649.13,   
278.00, ICD10: O99.213, E66.09  
- URINE OB DIP B/O  
  
Narda Peters MD  
  
  
Electronically signed by   
Narda Peters MD at   
2024 1:20 PM EDT  
                                        [facility]  
   
                                                    2024 Miscellaneous   
Notes                                   Formatting of this note might   
be different from the original.  
S: Karina Lindsay is a 34 year old   
female who presents at   
2024, Alternate LAURYN Entry   
for a routine visit. Denies   
headache, visual changes, chest   
pain, shortness of breath,   
vaginal bleeding, leakage of   
fluid, or dysuria. Feeling   
well, no complaints. Good   
movement, No contractions  
Feels better with insulin at 14   
units. PP have been stable.   
Fasting hovers around 95  
  
O: See flow sheet  
Gen: No apparent distress  
Abd: Gravid, nontender  
  
CHRIS 27  
BPP8/8  
EFW 98%  
Discussed possible elective c/s   
for EFW and GDMA2  
  
ASSESSMENT/PLAN:  
1. 36 weeks gestation of   
pregnancy - ICD9: V22.2, ICD10:   
Z3A.36 (primary diagnosis)  
- URINE OB DIP B/O  
  
2. Encounter for supervision of   
high risk pregnancy in third   
trimester, antepartum - ICD9:   
V23.9, ICD10: O09.93  
- URINE OB DIP B/O  
  
3. Insulin controlled   
gestational diabetes mellitus   
(GDM) in third trimester -   
ICD9: 648.83, ICD10: O24.414  
- URINE OB DIP B/O  
  
4. Other obesity due to excess   
calories affecting pregnancy in   
third trimester - ICD9: 649.13,   
278.00, ICD10: O99.213, E66.09  
- URINE OB DIP B/O  
  
Narda Peters MD  
  
  
Electronically signed by   
Narda Peters MD at   
2024 1:20 PM EDT  
documented in this encounter            [facility]  
   
                                        2024 Instructions   
  
  
Kelly Cervantes MA - 2024   
8:39 AM EDTFormatting of this   
note might be different from   
the original.  
SEQUENTIAL SCREENINGS  
The [facility] offers   
sequential screenings for women   
who are interested in   
screenings for chromosomal   
abnormalities and certain birth   
defects during a pregnancy. The   
sequential screen combines   
ultrasound and blood tests to   
determine the risk of   
chromosomal abnormalities,   
including Down's Syndrome   
(Trisomy 21) and Trisomy 18, as   
well as open neural tube   
defects including spina bifida.   
Ultrasound examination is   
performed between 11 weeks and   
13 weeks gestational age. Blood   
tests are drawn after the   
ultrasound and again later in   
the pregnancy between 15 and 21   
weeks gestational age. Please   
let your physician know if you   
are interested in this testing.   
It will require an appointment   
with our ultrasound technician.   
This is not an ultrasound   
performed by a physician in our   
office during a routine visit.  
  
  
SIGNS AND SYMPTOMS OF    
LABOR  
1. Contractions every 10   
minutes or more often  
2. Clear, pink, or brownish   
fluid (water) leaking from   
vagina  
3. Feeling that baby is pushing   
down, pressure  
4. Low, dull backache  
5. Cramps that feel like a   
period  
6. Cramps with or without   
diarrhea  
  
If you notice any of the above   
symptoms, contact our office at   
[phone] and ask to speak   
with a nurse.  
  
After hours, you can call   
doctors registry at   
[phone] OR call \[facility]\"" at [phone] and   
ask to have the doctor on call   
paged.  
  
If you consider this an   
emergency, dial 9-1-6 or go to   
your nearest emergency   
department.  
  
NEED HELP? Are you dealing with   
a violent or abusive   
relationship? Are you a victim   
of rape or sexual assult? Call   
[facility] ([place])   
24 hour Crisis Hotline:   
320.632.9140 or 287-523-8230.  
  
PREGNANCY MANUAL  
Your Guide to a Healthy   
Pregnancy manual is now   
on-line. Visit   
[email or web address]  
[place] to download your   
free copy  
  
Electronically signed by   
Kelly Cervantes MA at   
2024 8:39 AM EDT  
  
documented in this encounter            [facility]  
   
                                        2024 Note     HNO ID: [id number]  
Author: JANET LILLY APRN.CNM  
Service: ?  
Author Type: Midwife  
Type: Progress Notes  
Filed: 2024 09:40  
Note Text:  
NST SUMMARY  
PROVIDER ASSESSMENT AND   
INTERPRETATION  
Karina Lindsay is a 34 year old   
female, , who is at   
36w2d with an LAURYN of  
2024, Alternate LAURYN Entry   
dating method.  
Indications for NST:   
Gestational Diabetes - Insulin   
Controlled and Obesity  
Baseline: 125  
Variability: Moderate  
Accelerations: Present 15 X 15  
Decelerations: None  
Contractions: TOCO: None  
Interpretation: Category I and   
Reactive  
SIGNATURE: Janet Lilly APRN.CNM                                [facility]  
   
                                                    2024 History of Presen  
t   
illness Narrative                       Formatting of this note might   
be different from the original.  
NST SUMMARY  
  
PROVIDER ASSESSMENT AND   
INTERPRETATION  
  
Karina Lindsay is a 34 year old   
female, , who is at   
36w2d with an LAURYN of 2024,   
Alternate LAURYN Entry dating   
method.  
  
Indications for NST:   
Gestational Diabetes - Insulin   
Controlled and Obesity  
Baseline: 125  
Variability: Moderate  
Accelerations: Present 15 X 15  
Decelerations: None  
Contractions: TOCO: None  
Interpretation: Category I and   
Reactive  
  
SIGNATURE: Janet Lilly APRN.CNM  
  
  
Electronically signed by   
Janet Lilly APRN.CNM at   
2024 9:40 AM EDT  
documented in this encounter            [facility]  
   
                                        2024 Progress note Formatting of t  
his note might   
be different from the original.  
Patient for NST only. NST   
reactive, Cat. 1 tracing. RTO   
Thursday for OB US / PENG Lilly APRN.CNM  
  
Electronically signed by   
Janet Lilly APRN.CNM at   
2024 9:40 AM EDT  
                                        [facility]  
   
                                                    2024 Miscellaneous   
Notes                                   Formatting of this note might   
be different from the original.  
Patient for NST only. NST   
reactive, Cat. 1 tracing. RTO   
Thursday for OB US / PENG Lilly APRN.CNM  
  
Electronically signed by   
Janet Lilly APRN.CNM at   
2024 9:40 AM EDT  
documented in this encounter            [facility]  
   
                                        2024 Instructions   
  
  
Kelly Cervantes MA - 2024   
9:06 AM EDTFormatting of this   
note might be different from   
the original.  
SEQUENTIAL SCREENINGS  
The [facility] offers   
sequential screenings for women   
who are interested in   
screenings for chromosomal   
abnormalities and certain birth   
defects during a pregnancy. The   
sequential screen combines   
ultrasound and blood tests to   
determine the risk of   
chromosomal abnormalities,   
including Down's Syndrome   
(Trisomy 21) and Trisomy 18, as   
well as open neural tube   
defects including spina bifida.   
Ultrasound examination is   
performed between 11 weeks and   
13 weeks gestational age. Blood   
tests are drawn after the   
ultrasound and again later in   
the pregnancy between 15 and 21   
weeks gestational age. Please   
let your physician know if you   
are interested in this testing.   
It will require an appointment   
with our ultrasound technician.   
This is not an ultrasound   
performed by a physician in our   
office during a routine visit.  
  
  
SIGNS AND SYMPTOMS OF    
LABOR  
1. Contractions every 10   
minutes or more often  
2. Clear, pink, or brownish   
fluid (water) leaking from   
vagina  
3. Feeling that baby is pushing   
down, pressure  
4. Low, dull backache  
5. Cramps that feel like a   
period  
6. Cramps with or without   
diarrhea  
  
If you notice any of the above   
symptoms, contact our office at   
[phone] and ask to speak   
with a nurse.  
  
After hours, you can call   
doctors registry at   
[phone] OR call \[facility]\"" at [phone] and   
ask to have the doctor on call   
paged.  
  
If you consider this an   
emergency, dial 9-1-7 or go to   
your nearest emergency   
department.  
  
NEED HELP? Are you dealing with   
a violent or abusive   
relationship? Are you a victim   
of rape or sexual assult? Call   
[facility] ([place])   
24 hour Crisis Hotline:   
183.735.8095 or 411-053-6563.  
  
PREGNANCY MANUAL  
Your Guide to a Healthy   
Pregnancy manual is now   
on-line. Visit   
[email or web address]  
[place] to download your   
free copy  
  
Electronically signed by   
Kelly Cervantes MA at   
2024 9:06 AM EDT  
  
documented in this encounter            [facility]  
   
                                        2024 Note     HNO ID: [id number]  
Author: XENIA MARTINES MD  
Service: ?  
Author Type: Physician  
Type: Progress Notes  
Filed: 2024 10:01  
Note Text:  
NST SUMMARY  
PROVIDER ASSESSMENT AND   
INTERPRETATION  
Karina Lindsay is a 34 year old   
female, , who is at   
35w6d with an LAURYN of  
2024, Alternate LAURYN Entry   
dating method.  
Indications for NST:   
Gestational Diabetes - Insulin   
Controlled  
Baseline: 135  
Variability: Moderate  
Accelerations: Present 15 X 15  
Decelerations: Variable  
Contractions: TOCO: None  
Interpretation: Reactive  
SIGNATURE: Xenia Martines MD             [facility]  
   
                                                    2024 History of Presen  
t   
illness Narrative                       Formatting of this note might   
be different from the original.  
NST SUMMARY  
  
PROVIDER ASSESSMENT AND   
INTERPRETATION  
  
Karina Lindsay is a 34 year old   
female, , who is at   
35w6d with an LAURYN of 2024,   
Alternate LAURYN Entry dating   
method.  
  
Indications for NST:   
Gestational Diabetes - Insulin   
Controlled  
Baseline: 135  
Variability: Moderate  
Accelerations: Present 15 X 15  
Decelerations: Variable  
Contractions: TOCO: None  
Interpretation: Reactive  
  
SIGNATURE: Xenia Martines MD  
  
  
Electronically signed by Xenia Martines MD at 2024 10:01   
AM EDT  
documented in this encounter            [facility]  
   
                                        2024 Instructions   
  
  
Tobin Florez MA - 2024   
9:26 AM EDTFormatting of this   
note might be different from   
the original.  
SEQUENTIAL SCREENINGS  
The [facility] offers   
sequential screenings for women   
who are interested in   
screenings for chromosomal   
abnormalities and certain birth   
defects during a pregnancy. The   
sequential screen combines   
ultrasound and blood tests to   
determine the risk of   
chromosomal abnormalities,   
including Down's Syndrome   
(Trisomy 21) and Trisomy 18, as   
well as open neural tube   
defects including spina bifida.   
Ultrasound examination is   
performed between 11 weeks and   
13 weeks gestational age. Blood   
tests are drawn after the   
ultrasound and again later in   
the pregnancy between 15 and 21   
weeks gestational age. Please   
let your physician know if you   
are interested in this testing.   
It will require an appointment   
with our ultrasound technician.   
This is not an ultrasound   
performed by a physician in our   
office during a routine visit.  
  
  
SIGNS AND SYMPTOMS OF    
LABOR  
1. Contractions every 10   
minutes or more often  
2. Clear, pink, or brownish   
fluid (water) leaking from   
vagina  
3. Feeling that baby is pushing   
down, pressure  
4. Low, dull backache  
5. Cramps that feel like a   
period  
6. Cramps with or without   
diarrhea  
  
If you notice any of the above   
symptoms, contact our office at   
[phone] and ask to speak   
with a nurse.  
  
After hours, you can call   
doctors registry at   
[phone] OR call \[facility]\"" at [phone] and   
ask to have the doctor on call   
paged.  
  
If you consider this an   
emergency, dial 9-1-4 or go to   
your nearest emergency   
department.  
  
NEED HELP? Are you dealing with   
a violent or abusive   
relationship? Are you a victim   
of rape or sexual assult? Call   
[facility] ([place])   
24 hour Crisis Hotline:   
486.638.5598 or 243-837-4069.  
  
PREGNANCY MANUAL  
Your Guide to a Healthy   
Pregnancy manual is now   
on-line. Visit   
[email or web address]  
[place] to download your   
free copy  
  
Electronically signed by Tobin Florez MA at 2024 9:26   
AM EDT  
  
documented in this encounter            [facility]  
   
                                                    07- Telephone   
encounter Note                          Formatting of this note might   
be different from the original.  
Ok, that is fine she can stay   
on Friday . Pt. Made aware.  
  
Yana Strange LPN  
  
Electronically signed by Yana Strange LPN at 07/15/2024   
4:34 PM EDT  
                                        [facility]  
   
                                                    07- Miscellaneous   
Notes                                   Formatting of this note might   
be different from the original.  
Ok, that is fine she can stay   
on Friday . Pt. Made aware.  
  
Yana Strange LPN  
  
Electronically signed by Yana Strange LPN at 07/15/2024   
4:34 PM EDT  
Formatting of this note might   
be different from the original.  
Patient called back and stated   
she is unable to come in 24, she works the next 3   
days and that is why she   
schedule for 24  
  
Alondra Schofield  
  
Electronically signed by Alondra Parks at 07/15/2024 4:26   
PM EDT  
Formatting of this note might   
be different from the original.  
Left message for patient to   
return call. When she calls,   
please ask if we can  her   
Friday Iron infusion () to   
Wednesday () at 9 AM.  
  
Annette Francis  
  
Electronically signed by Annette Francis at 07/15/2024 4:06 PM   
EDT  
documented in this encounter            [facility]  
   
                                                    07- Telephone   
encounter Note                          Formatting of this note might   
be different from the original.  
Patient called back and stated   
she is unable to come in 24, she works the next 3   
days and that is why she   
schedule for 24  
  
Alondra Schofield  
  
Electronically signed by Alondra Parks at 07/15/2024 4:26   
PM EDT  
                                        [facility]  
   
                                                    07- Telephone   
encounter Note                          Formatting of this note might   
be different from the original.  
Left message for patient to   
return call. When she calls,   
please ask if we can  her   
Friday Iron infusion () to   
Wednesday () at 9 AM.  
  
Annette Fracnis  
  
Electronically signed by Annette Francis at 07/15/2024 4:06 PM   
EDT  
                                        [facility]  
   
                                        07- Note     HNO ID: [id number]  
Author: NARDA PETERS MD  
Service: ?  
Author Type: Physician  
Type: Progress Notes  
Filed: 07/15/2024 12:03  
Note Text:  
NST SUMMARY  
PROVIDER ASSESSMENT AND   
INTERPRETATION  
Indications for NST:   
Gestational Diabetes - Insulin   
Controlled  
Baseline: 130  
Variability: Moderate  
Accelerations: Present 15 X 15  
Decelerations: None  
Interpretation: Category I  
SIGNATURE: Narda Peters MD          [facility]  
   
                                                    07- History of Presen  
t   
illness Narrative                       Formatting of this note might   
be different from the original.  
NST SUMMARY  
  
PROVIDER ASSESSMENT AND   
INTERPRETATION  
  
Indications for NST:   
Gestational Diabetes - Insulin   
Controlled  
Baseline: 130  
Variability: Moderate  
Accelerations: Present 15 X 15  
Decelerations: None  
Interpretation: Category I  
  
SIGNATURE: Narda Peters MD  
  
Electronically signed by   
Narda Peters MD at   
07/15/2024 12:03 PM EDT  
documented in this encounter            [facility]  
   
                                        07- Progress note Formatting of t  
his note might   
be different from the original.  
S: Karina Lindsay is a 34 year old   
female who presents at   
2024, Alternate LAURYN Entry   
for a routine visit. Denies   
headache, visual changes, chest   
pain, shortness of breath,   
vaginal bleeding, leakage of   
fluid, or dysuria. Feeling   
well, no complaints. Good   
movement, No contractions.   
Doing Ok with BG. Random   
elevated/ On NPH of 14  
  
O: See flow sheet  
Gen: No apparent distress  
Abd: Gravid, nontender  
  
Reactive NST  
GBS collected  
  
ASSESSMENT/PLAN:  
1. Encounter for supervision of   
high risk pregnancy in third   
trimester, antepartum - ICD9:   
V23.9, ICD10: O09.93 (primary   
diagnosis)  
- URINE OB DIP B/O  
- ROUTINE, PRENATAL GROUP B   
STREP PCR  
  
2. 35 weeks gestation of   
pregnancy - ICD9: V22.2, ICD10:   
Z3A.35  
- URINE OB DIP B/O  
- ROUTINE, PRENATAL GROUP B   
STREP PCR  
  
3. Other obesity due to excess   
calories affecting pregnancy in   
third trimester - ICD9: 649.13,   
278.00, ICD10: O99.213, E66.09  
- URINE OB DIP B/O  
- ROUTINE, PRENATAL GROUP B   
STREP PCR  
  
4. Insulin controlled   
gestational diabetes mellitus   
(GDM) in third trimester -   
ICD9: 648.83, ICD10: O24.414  
NPH 14 qhs  
- URINE OB DIP B/O  
- ROUTINE, PRENATAL GROUP B   
STREP PCR  
  
Narda Peters MD  
  
  
Electronically signed by   
Narda Peters MD at   
07/15/2024 12:03 PM EDT  
                                        [facility]  
   
                                                    07- Miscellaneous   
Notes                                   Formatting of this note might   
be different from the original.  
S: Karina Lindsay is a 34 year old   
female who presents at   
2024, Alternate LAURYN Entry   
for a routine visit. Denies   
headache, visual changes, chest   
pain, shortness of breath,   
vaginal bleeding, leakage of   
fluid, or dysuria. Feeling   
well, no complaints. Good   
movement, No contractions.   
Doing Ok with BG. Random   
elevated/ On NPH of 14  
  
O: See flow sheet  
Gen: No apparent distress  
Abd: Gravid, nontender  
  
Reactive NST  
GBS collected  
  
ASSESSMENT/PLAN:  
1. Encounter for supervision of   
high risk pregnancy in third   
trimester, antepartum - ICD9:   
V23.9, ICD10: O09.93 (primary   
diagnosis)  
- URINE OB DIP B/O  
- ROUTINE, PRENATAL GROUP B   
STREP PCR  
  
2. 35 weeks gestation of   
pregnancy - ICD9: V22.2, ICD10:   
Z3A.35  
- URINE OB DIP B/O  
- ROUTINE, PRENATAL GROUP B   
STREP PCR  
  
3. Other obesity due to excess   
calories affecting pregnancy in   
third trimester - ICD9: 649.13,   
278.00, ICD10: O99.213, E66.09  
- URINE OB DIP B/O  
- ROUTINE, PRENATAL GROUP B   
STREP PCR  
  
4. Insulin controlled   
gestational diabetes mellitus   
(GDM) in third trimester -   
ICD9: 648.83, ICD10: O24.414  
NPH 14 qhs  
- URINE OB DIP B/O  
- ROUTINE, PRENATAL GROUP B   
STREP PCR  
  
Narda Peters MD  
  
  
Electronically signed by   
Narda Peters MD at   
07/15/2024 12:03 PM EDT  
documented in this encounter            [facility]  
   
                                        07- Instructions   
  
  
Kelly Cervantes MA - 07/15/2024   
8:55 AM EDTFormatting of this   
note might be different from   
the original.  
SEQUENTIAL SCREENINGS  
The [facility] offers   
sequential screenings for women   
who are interested in   
screenings for chromosomal   
abnormalities and certain birth   
defects during a pregnancy. The   
sequential screen combines   
ultrasound and blood tests to   
determine the risk of   
chromosomal abnormalities,   
including Down's Syndrome   
(Trisomy 21) and Trisomy 18, as   
well as open neural tube   
defects including spina bifida.   
Ultrasound examination is   
performed between 11 weeks and   
13 weeks gestational age. Blood   
tests are drawn after the   
ultrasound and again later in   
the pregnancy between 15 and 21   
weeks gestational age. Please   
let your physician know if you   
are interested in this testing.   
It will require an appointment   
with our ultrasound technician.   
This is not an ultrasound   
performed by a physician in our   
office during a routine visit.  
  
  
SIGNS AND SYMPTOMS OF    
LABOR  
1. Contractions every 10   
minutes or more often  
2. Clear, pink, or brownish   
fluid (water) leaking from   
vagina  
3. Feeling that baby is pushing   
down, pressure  
4. Low, dull backache  
5. Cramps that feel like a   
period  
6. Cramps with or without   
diarrhea  
  
If you notice any of the above   
symptoms, contact our office at   
[phone] and ask to speak   
with a nurse.  
  
After hours, you can call   
doctors registry at   
[phone] OR call \[facility]\"" at [phone] and   
ask to have the doctor on call   
paged.  
  
If you consider this an   
emergency, dial - or go to   
your nearest emergency   
department.  
  
NEED HELP? Are you dealing with   
a violent or abusive   
relationship? Are you a victim   
of rape or sexual assult? Call   
[facility] ([place])   
24 hour Crisis Hotline:   
202.723.3361 or 148-594-5718.  
  
PREGNANCY MANUAL  
Your Guide to a Healthy   
Pregnancy manual is now   
on-line. Visit   
[email or web address]  
[place] to download your   
free copy  
  
Electronically signed by   
Kelly Cervantes MA at   
07/15/2024 8:55 AM EDT  
  
documented in this encounter            [facility]  
   
                                        2024 Progress note Formatting of t  
his note might   
be different from the original.  
ANTHONY-S: Karina Lindsay is a 34 year   
old female who presents at   
34w6d with LAURYN:2024,   
Alternate LAURYN Entry for a NST.   
Denies headache, visual   
changes, chest pain, shortness   
of breath, vaginal bleeding,   
leakage of fluid, or dysuria.   
BP mildly elevated in 130s  
  
O: See flow sheet  
Gen: No apparent distress  
Abd: Gravid, nontender  
NST reactive  
Sai /80  
  
ASSESSMENT/PLAN:  
1. 34 weeks gestation of   
pregnancy  
  
2. Encounter for supervision of   
high risk pregnancy in third   
trimester, antepartum  
  
3. Other obesity due to excess   
calories affecting pregnancy in   
third trimester  
  
4. Insulin controlled   
gestational diabetes mellitus   
(GDM) in third trimester  
  
5. Anemia during pregnancy in   
third trimester  
-IV iron infusion today  
  
6. Elevated BP without   
diagnosis of hypertension  
-Preeclampsia labs are normal   
on 24  
-Sai BP normal range today  
-Continue checking BP  
  
-PTL precautions reviewed and   
when to call  
-RTO as scheduled  
  
Letty Beaver APRN.CNM  
  
Electronically signed by Letty Beaver APRN.CNM at 2024   
1:19 PM EDT  
                                        [facility]  
   
                                                    2024 Miscellaneous   
Notes                                   Formatting of this note might   
be different from the original.  
ANTHONY-S: Karina Lindsay is a 34 year   
old female who presents at   
34w6d with LAURYN:2024,   
Alternate LAURYN Entry for a NST.   
Denies headache, visual   
changes, chest pain, shortness   
of breath, vaginal bleeding,   
leakage of fluid, or dysuria.   
BP mildly elevated in 130s  
  
O: See flow sheet  
Gen: No apparent distress  
Abd: Gravid, nontender  
NST reactive  
Sai /80  
  
ASSESSMENT/PLAN:  
1. 34 weeks gestation of   
pregnancy  
  
2. Encounter for supervision of   
high risk pregnancy in third   
trimester, antepartum  
  
3. Other obesity due to excess   
calories affecting pregnancy in   
third trimester  
  
4. Insulin controlled   
gestational diabetes mellitus   
(GDM) in third trimester  
  
5. Anemia during pregnancy in   
third trimester  
-IV iron infusion today  
  
6. Elevated BP without   
diagnosis of hypertension  
-Preeclampsia labs are normal   
on 24  
-Sai BP normal range today  
-Continue checking BP  
  
-PTL precautions reviewed and   
when to call  
-RTO as scheduled  
  
Letty Beaver APRN.CNM  
  
Electronically signed by Letty Beaver APRN.CNM at 2024   
1:19 PM EDT  
documented in this encounter            [facility]  
   
                                        2024 Note     HNO ID: [id number]  
Author: LETTY BEAVER APRN.CNM  
Service: ?  
Author Type: Midwife  
Type: Progress Notes  
Filed: 2024 13:19  
Note Text:  
NST SUMMARY  
PROVIDER ASSESSMENT AND   
INTERPRETATION  
Karina Lindsay is a 34 year old   
female, , who is at   
34w6d with an LAURYN of  
2024, Alternate LAURYN Entry   
dating method.  
Indications for NST:   
Gestational Diabetes - Insulin   
Controlled  
Baseline: 130  
Variability: Moderate  
Accelerations: Present 15 X 15  
Decelerations: None  
Contractions: TOCO: Irregular  
Interpretation: Reactive  
SIGNATURE: Letty Beaver APRN.CNM                                [facility]  
   
                                                    2024 History of Presen  
t   
illness Narrative                       Formatting of this note might   
be different from the original.  
NST SUMMARY  
  
PROVIDER ASSESSMENT AND   
INTERPRETATION  
  
Karina Lindsay is a 34 year old   
female, , who is at   
34w6d with an LAURYN of 2024,   
Alternate LAURYN Entry dating   
method.  
  
Indications for NST:   
Gestational Diabetes - Insulin   
Controlled  
Baseline: 130  
Variability: Moderate  
Accelerations: Present 15 X 15  
Decelerations: None  
Contractions: TOCO: Irregular  
Interpretation: Reactive  
  
SIGNATURE: Letty Beaver APRN.CNM  
  
  
Electronically signed by Letty Beaver APRN.CNM at 2024   
1:19 PM EDT  
documented in this encounter            [facility]  
   
                                        2024 Tobin Mclain MA - 2024   
9:35 AM EDTFormatting of this   
note might be different from   
the original.  
SEQUENTIAL SCREENINGS  
The [facility] offers   
sequential screenings for women   
who are interested in   
screenings for chromosomal   
abnormalities and certain birth   
defects during a pregnancy. The   
sequential screen combines   
ultrasound and blood tests to   
determine the risk of   
chromosomal abnormalities,   
including Down's Syndrome   
(Trisomy 21) and Trisomy 18, as   
well as open neural tube   
defects including spina bifida.   
Ultrasound examination is   
performed between 11 weeks and   
13 weeks gestational age. Blood   
tests are drawn after the   
ultrasound and again later in   
the pregnancy between 15 and 21   
weeks gestational age. Please   
let your physician know if you   
are interested in this testing.   
It will require an appointment   
with our ultrasound technician.   
This is not an ultrasound   
performed by a physician in our   
office during a routine visit.  
  
  
SIGNS AND SYMPTOMS OF    
LABOR  
1. Contractions every 10   
minutes or more often  
2. Clear, pink, or brownish   
fluid (water) leaking from   
vagina  
3. Feeling that baby is pushing   
down, pressure  
4. Low, dull backache  
5. Cramps that feel like a   
period  
6. Cramps with or without   
diarrhea  
  
If you notice any of the above   
symptoms, contact our office at   
[phone] and ask to speak   
with a nurse.  
  
After hours, you can call   
doctors registry at   
[phone] OR call \[facility]\"" at [phone] and   
ask to have the doctor on call   
paged.  
  
If you consider this an   
emergency, dial 9-1-1 or go to   
your nearest emergency   
department.  
  
NEED HELP? Are you dealing with   
a violent or abusive   
relationship? Are you a victim   
of rape or sexual assult? Call   
[facility] ([place])   
24 hour Crisis Hotline:   
639.741.1181 or 848-805-1397.  
  
PREGNANCY MANUAL  
Your Guide to a Healthy   
Pregnancy manual is now   
on-line. Visit   
[email or web address]  
[place] to download your   
free copy  
  
Electronically signed by Tobin Florez MA at 2024 9:35   
AM EDT  
  
documented in this encounter            [facility]  
   
                                        2024 Note     HNO ID: [id number]  
Author: SHAYNA ESCALERA MD  
Service: ?  
Author Type: Physician  
Type: Progress Notes  
Filed: 2024 12:18  
Note Text:  
NST SUMMARY  
PROVIDER ASSESSMENT AND   
INTERPRETATION  
Karina Lindsay is a 34 year old   
female, , who is at   
34w2d with an LAURYN of  
2024, Alternate LAURYN Entry   
dating method.  
Indications for NST:   
Gestational Diabetes - Insulin   
Controlled  
Baseline: 135  
Variability: Moderate  
Accelerations: Present 15 X 15  
Decelerations: None  
Contractions: TOCO: None  
Interpretation: Reactive  
SIGNATURE: Shayna Escalera DO             [facility]  
   
                                                    2024 History of Presen  
t   
illness Narrative                       Formatting of this note might   
be different from the original.  
NST SUMMARY  
  
PROVIDER ASSESSMENT AND   
INTERPRETATION  
  
Karina Lindsay is a 34 year old   
female, , who is at   
34w2d with an LAURYN of 2024,   
Alternate LAURYN Entry dating   
method.  
  
Indications for NST:   
Gestational Diabetes - Insulin   
Controlled  
Baseline: 135  
Variability: Moderate  
Accelerations: Present 15 X 15  
Decelerations: None  
Contractions: TOCO: None  
Interpretation: Reactive  
  
SIGNATURE: Shayna Escalera DO  
  
  
Electronically signed by   
Shayna Escalera MD at 2024   
12:18 PM EDT  
documented in this encounter            [facility]  
   
                                                    2024 Telephone   
encounter Note                          Formatting of this note might   
be different from the original.  
I reviewed the patient on the   
1st-time treatment report. The   
patient does not have a cancer   
diagnosis or a chemo/radiation   
regimen. No further Financial   
Navigator intervention is   
needed at this time.  
Electronically signed by   
Deyvi Renee at 2024   
11:23 AM EDT  
                                        [facility]  
   
                                                    2024 Miscellaneous   
Notes                                   Formatting of this note might   
be different from the original.  
I reviewed the patient on the   
1st-time treatment report. The   
patient does not have a cancer   
diagnosis or a chemo/radiation   
regimen. No further Financial   
Navigator intervention is   
needed at this time.  
Electronically signed by   
Deyvi Renee at 2024   
11:23 AM EDT  
documented in this encounter            [facility]  
   
                                        2024 Progress note Formatting of t  
his note might   
be different from the original.  
SW- Pt doing well. No HA,   
vision changes, upper abd pain,   
N/V, ctx, vb, lof. Good FM  
PE:  
Gen- NAD, well appearing  
Abd- Soft, gravid, NT  
See flowsheet  
A/p 34 wk gestation  
- Initial BP higher than   
baseline. Serial true BP   
normal. No symptoms of pre e   
and no proteinuria. Check pre e   
panel but low concern for pre e   
at this time. Reviewed warning   
signs and symptoms of pre e,   
and reasons to call  
- A2GDM: Cont twice weekly   
testing and has repeat growth   
US scheduled. Patient using CGM   
and some difference in CGM   
readings and fingerstick.   
Discussed with patient if she   
prefers CGM (after re   
calibrating), will place   
referral for MFM endo to   
manage. She declines at this   
time and would like for our   
office to continue to monitor   
BG levels, and she plans on   
fingerstick. Insulin increased   
3 days ago  
- Anemia: Scheduled for IV iron  
- RTO scheduled visits  
Shayna Escalera DO  
Electronically signed by   
Shayna Escalera MD at 2024   
12:18 PM EDT  
                                        [facility]  
   
                                                    2024 Miscellaneous   
Notes                                   Formatting of this note might   
be different from the original.  
SW- Pt doing well. No HA,   
vision changes, upper abd pain,   
N/V, ctx, vb, lof. Good FM  
PE:  
Gen- NAD, well appearing  
Abd- Soft, gravid, NT  
See flowsheet  
A/p 34 wk gestation  
- Initial BP higher than   
baseline. Serial true BP   
normal. No symptoms of pre e   
and no proteinuria. Check pre e   
panel but low concern for pre e   
at this time. Reviewed warning   
signs and symptoms of pre e,   
and reasons to call  
- A2GDM: Cont twice weekly   
testing and has repeat growth   
US scheduled. Patient using CGM   
and some difference in CGM   
readings and fingerstick.   
Discussed with patient if she   
prefers CGM (after re   
calibrating), will place   
referral for MFM endo to   
manage. She declines at this   
time and would like for our   
office to continue to monitor   
BG levels, and she plans on   
fingerstick. Insulin increased   
3 days ago  
- Anemia: Scheduled for IV iron  
- RTO scheduled visits  
Shayna Escalera DO  
Electronically signed by   
Shayna Escalera MD at 2024   
12:18 PM EDT  
documented in this encounter            [facility]  
   
                                        2024 Instructions   
  
  
Kelly Cervantes MA - 2024   
9:07 AM EDTFormatting of this   
note might be different from   
the original.  
SEQUENTIAL SCREENINGS  
The [facility] offers   
sequential screenings for women   
who are interested in   
screenings for chromosomal   
abnormalities and certain birth   
defects during a pregnancy. The   
sequential screen combines   
ultrasound and blood tests to   
determine the risk of   
chromosomal abnormalities,   
including Down's Syndrome   
(Trisomy 21) and Trisomy 18, as   
well as open neural tube   
defects including spina bifida.   
Ultrasound examination is   
performed between 11 weeks and   
13 weeks gestational age. Blood   
tests are drawn after the   
ultrasound and again later in   
the pregnancy between 15 and 21   
weeks gestational age. Please   
let your physician know if you   
are interested in this testing.   
It will require an appointment   
with our ultrasound technician.   
This is not an ultrasound   
performed by a physician in our   
office during a routine visit.  
  
  
SIGNS AND SYMPTOMS OF    
LABOR  
1. Contractions every 10   
minutes or more often  
2. Clear, pink, or brownish   
fluid (water) leaking from   
vagina  
3. Feeling that baby is pushing   
down, pressure  
4. Low, dull backache  
5. Cramps that feel like a   
period  
6. Cramps with or without   
diarrhea  
  
If you notice any of the above   
symptoms, contact our office at   
[phone] and ask to speak   
with a nurse.  
  
After hours, you can call   
doctors registry at   
[phone] OR call \[facility]\"" at [phone] and   
ask to have the doctor on call   
paged.  
  
If you consider this an   
emergency, dial [phone] or go to   
your nearest emergency   
department.  
  
NEED HELP? Are you dealing with   
a violent or abusive   
relationship? Are you a victim   
of rape or sexual assult? Call   
[facility] ([place])   
24 hour Crisis Hotline:   
210.537.5967 or 440-213-9741.  
  
PREGNANCY MANUAL  
Your Guide to a Healthy   
Pregnancy manual is now   
on-line. Visit   
[email or web address]  
[place] to download your   
free copy  
  
Electronically signed by   
Kelly Cervantes MA at   
2024 9:07 AM EDT  
  
documented in this encounter            [facility]  
   
                                        2024 Note     HNO ID: [id number]  
Author: NARDA PETERS MD  
Service: ?  
Author Type: Physician  
Type: Progress Notes  
Filed: 2024 13:22  
Note Text:  
NST SUMMARY  
PROVIDER ASSESSMENT AND   
INTERPRETATION  
Indications for NST:   
Gestational Diabetes - Insulin   
Controlled and Obesity  
Baseline: 130  
Variability: Moderate  
Accelerations: Present 15 X 15  
Decelerations: None  
Interpretation: Category I  
SIGNATURE: Narda Peters MD          [facility]  
   
                                                    2024 History of Presen  
t   
illness Narrative                       Formatting of this note might   
be different from the original.  
NST SUMMARY  
  
PROVIDER ASSESSMENT AND   
INTERPRETATION  
  
Indications for NST:   
Gestational Diabetes - Insulin   
Controlled and Obesity  
Baseline: 130  
Variability: Moderate  
Accelerations: Present 15 X 15  
Decelerations: None  
Interpretation: Category I  
  
SIGNATURE: Narda Peters MD  
  
Electronically signed by   
Narda Peters MD at   
2024 1:22 PM EDT  
documented in this encounter            [facility]  
   
                                        2024 Progress note Formatting of t  
his note might   
be different from the original.  
S: Karina Lindsay is a 34 year old   
female who presents at   
2024, Alternate LAURYN Entry   
for a routine visit. Denies   
headache, visual changes, chest   
pain, shortness of breath,   
vaginal bleeding, leakage of   
fluid, or dysuria. Feeling   
well, no complaints. Good   
movement, No contractions  
  
O: See flow sheet  
Gen: No apparent distress  
Abd: Gravid, nontender  
  
Fasting still elevated. CGM   
shows some low overnight.   
Encouraged qhs snack . Trying   
to be more consistent with meal   
time. Increased insulin to 14   
units  
NST reactive  
  
ASSESSMENT/PLAN:  
1. 33 weeks gestation of   
pregnancy - ICD9: V22.2, ICD10:   
Z3A.33 (primary diagnosis)  
Labor precautions  
- URINE OB DIP B/O  
  
2. Encounter for supervision of   
high risk pregnancy in third   
trimester, antepartum - ICD9:   
V23.9, ICD10: O09.93  
  
3. Other obesity due to excess   
calories affecting pregnancy in   
third trimester - ICD9: 649.13,   
278.00, ICD10: O99.213, E66.09  
  
4. Anemia during pregnancy in   
third trimester - ICD9: 648.23,   
ICD10: O99.013  
iron  
  
5. Insulin controlled   
gestational diabetes mellitus   
(GDM) in third trimester -  
Increased insulin to 14 units  
  
Narda Peters MD  
  
  
Electronically signed by   
Narda Peters MD at   
2024 1:22 PM EDT  
                                        [facility]  
   
                                                    2024 Miscellaneous   
Notes                                   Formatting of this note might   
be different from the original.  
S: Karina Lindsay is a 34 year old   
female who presents at   
2024, Alternate LAURYN Entry   
for a routine visit. Denies   
headache, visual changes, chest   
pain, shortness of breath,   
vaginal bleeding, leakage of   
fluid, or dysuria. Feeling   
well, no complaints. Good   
movement, No contractions  
  
O: See flow sheet  
Gen: No apparent distress  
Abd: Gravid, nontender  
  
Fasting still elevated. CGM   
shows some low overnight.   
Encouraged qhs snack . Trying   
to be more consistent with meal   
time. Increased insulin to 14   
units  
NST reactive  
  
ASSESSMENT/PLAN:  
1. 33 weeks gestation of   
pregnancy - ICD9: V22.2, ICD10:   
Z3A.33 (primary diagnosis)  
Labor precautions  
- URINE OB DIP B/O  
  
2. Encounter for supervision of   
high risk pregnancy in third   
trimester, antepartum - ICD9:   
V23.9, ICD10: O09.93  
  
3. Other obesity due to excess   
calories affecting pregnancy in   
third trimester - ICD9: 649.13,   
278.00, ICD10: O99.213, E66.09  
  
4. Anemia during pregnancy in   
third trimester - ICD9: 648.23,   
ICD10: O99.013  
iron  
  
5. Insulin controlled   
gestational diabetes mellitus   
(GDM) in third trimester -  
Increased insulin to 14 units  
  
Narda Peters MD  
  
  
Electronically signed by   
Narda Peters MD at   
2024 1:22 PM EDT  
documented in this encounter            [facility]  
   
                                        2024 Instructions   
  
  
Lorena Conway MA -   
2024 9:15 AM   
EDTFormatting of this note   
might be different from the   
original.  
SEQUENTIAL SCREENINGS  
[facility] offers   
sequential screenings for women   
who are interested in   
screenings for chromosomal   
abnormalities and certain birth   
defects during a pregnancy. The   
sequential screen combines   
ultrasound and blood tests to   
determine the risk of   
chromosomal abnormalities,   
including Down's Syndrome   
(Trisomy 21) and Trisomy 18, as   
well as open neural tube   
defects including spina bifida.   
Ultrasound examination is   
performed between 11 weeks and   
13 weeks gestational age. Blood   
tests are drawn after the   
ultrasound and again later in   
the pregnancy between 15 and 21   
weeks gestational age. Please   
let your physician know if you   
are interested in this testing.   
It will require an appointment   
with our ultrasound technician.   
This is not an ultrasound   
performed by a physician in our   
office during a routine visit.  
  
  
SIGNS AND SYMPTOMS OF    
LABOR  
1. Contractions every 10   
minutes or more often  
2. Clear, pink, or brownish   
fluid (water) leaking from   
vagina  
3. Feeling that baby is pushing   
down, pressure  
4. Low, dull backache  
5. Cramps that feel like a   
period  
6. Cramps with or without   
diarrhea  
  
If you notice any of the above   
symptoms, contact our office at   
[phone] and ask to speak   
with a nurse.  
  
After hours, you can call   
doctors registry at   
[phone] OR call \[facility]\"" at [phone] and   
ask to have the doctor on call   
paged.  
  
If you consider this an   
emergency, dial 9-2 or go to   
your nearest emergency   
department.  
  
NEED HELP? Are you dealing with   
a violent or abusive   
relationship? Are you a victim   
of rape or sexual assult? Call   
[facility] ([place])   
24 hour Crisis Hotline:   
195.232.6388 or 958-331-4409.  
  
PREGNANCY MANUAL  
Your Guide to a Healthy   
Pregnancy manual is now   
on-line. Visit   
[email or web address]  
[place] to download your   
free copy  
  
Electronically signed by   
Lorena Conway MA at   
2024 9:15 AM EDT  
  
documented in this encounter            [facility]  
   
                                        2024 Note     HNO ID: [id number]  
Author: JANET LILLY APRN.CNM  
Service: ?  
Author Type: Midwife  
Type: Progress Notes  
Filed: 2024 10:07  
Note Text:  
NST SUMMARY  
PROVIDER ASSESSMENT AND   
INTERPRETATION  
Karina Lindsay is a 34 year old   
female, , who is at   
33w2d with an LAURYN of  
2024, Alternate LAURYN Entry   
dating method.  
Indications for NST:   
Gestational Diabetes - Insulin   
Controlled  
Baseline: 130  
Variability: Moderate  
Accelerations: Present 15 X 15  
Decelerations: None  
Contractions: TOCO: None  
Interpretation: Category I and   
Reactive  
SIGNATURE: Janet Lilly APRN.CNM                                [facility]  
   
                                                    2024 History of Presen  
t   
illness Narrative                       Formatting of this note might   
be different from the original.  
NST SUMMARY  
  
PROVIDER ASSESSMENT AND   
INTERPRETATION  
  
Karina Lindsay is a 34 year old   
female, , who is at   
33w2d with an LAURYN of 2024,   
Alternate LAURYN Entry dating   
method.  
  
Indications for NST:   
Gestational Diabetes - Insulin   
Controlled  
Baseline: 130  
Variability: Moderate  
Accelerations: Present 15 X 15  
Decelerations: None  
Contractions: TOCO: None  
Interpretation: Category I and   
Reactive  
  
SIGNATURE: Janet Lilly APRN.CNM  
  
  
Electronically signed by   
Janet Lilly APRN.CNM at   
2024 10:07 AM EDT  
documented in this encounter            [facility]  
   
                                        2024 Progress note Formatting of t  
his note might   
be different from the original.  
Patient for NST only. NST   
reactive. Cat. 1 tracing.   
Janet Lilly APRN.CNM  
  
Electronically signed by   
Janet Lilly APRN.CNM at   
2024 10:07 AM EDT  
                                        [facility]  
   
                                                    2024 Miscellaneous   
Notes                                   Formatting of this note might   
be different from the original.  
Patient for NST only. NST   
reactive. Cat. 1 tracing.   
Janet Lilly APRN.CNM  
  
Electronically signed by   
Janet Lilly APRN.CNM at   
2024 10:07 AM EDT  
documented in this encounter            [facility]  
   
                                        2024 Instructions   
  
  
Kelly Cervantes MA - 2024   
9:00 AM EDTFormatting of this   
note might be different from   
the original.  
SEQUENTIAL SCREENINGS  
The [facility] offers   
sequential screenings for women   
who are interested in   
screenings for chromosomal   
abnormalities and certain birth   
defects during a pregnancy. The   
sequential screen combines   
ultrasound and blood tests to   
determine the risk of   
chromosomal abnormalities,   
including Down's Syndrome   
(Trisomy 21) and Trisomy 18, as   
well as open neural tube   
defects including spina bifida.   
Ultrasound examination is   
performed between 11 weeks and   
13 weeks gestational age. Blood   
tests are drawn after the   
ultrasound and again later in   
the pregnancy between 15 and 21   
weeks gestational age. Please   
let your physician know if you   
are interested in this testing.   
It will require an appointment   
with our ultrasound technician.   
This is not an ultrasound   
performed by a physician in our   
office during a routine visit.  
  
  
SIGNS AND SYMPTOMS OF    
LABOR  
1. Contractions every 10   
minutes or more often  
2. Clear, pink, or brownish   
fluid (water) leaking from   
vagina  
3. Feeling that baby is pushing   
down, pressure  
4. Low, dull backache  
5. Cramps that feel like a   
period  
6. Cramps with or without   
diarrhea  
  
If you notice any of the above   
symptoms, contact our office at   
[phone] and ask to speak   
with a nurse.  
  
After hours, you can call   
doctors [facility] at   
[phone] OR call \[facility]\"" at [phone] and   
ask to have the doctor on call   
paged.  
  
If you consider this an   
emergency, dial [phone]--1 or go to   
your nearest emergency   
department.  
  
NEED HELP? Are you dealing with   
a violent or abusive   
relationship? Are you a victim   
of rape or sexual assult? Call   
[facility] ([place])   
24 hour Crisis Hotline:   
268.787.7149 or 159-998-4260.  
  
PREGNANCY MANUAL  
Your Guide to a Healthy   
Pregnancy manual is now   
on-line. Visit   
[email or web address]  
[place] to download your   
free copy  
  
Electronically signed by   
Kelly Cervantes MA at   
2024 9:00 AM EDT  
  
documented in this encounter            [facility]  
   
                                        2024 Note     HNO ID: [id number]  
Author: SHAYNA ESCALERA MD  
Service: ?  
Author Type: Physician  
Type: Progress Notes  
Filed: 2024 12:41  
Note Text:  
filed                                   [facility]  
   
                                                    2024 History of Presen  
t   
illness Narrative                       Formatting of this note might   
be different from the original.  
filed  
Electronically signed by   
Shayna Escalera MD at 2024   
12:41 PM EDT  
documented in this encounter            [facility]  
   
                                        2024 Note     HNO ID: [id number]  
Author: LETTY BEAVER APRN.CNM  
Service: ?  
Author Type: Midwife  
Type: Progress Notes  
Filed: 2024 10:32  
Note Text:  
NST SUMMARY  
PROVIDER ASSESSMENT AND   
INTERPRETATION  
Karina Lindsay is a 34 year old   
female, , who is at   
32w6d with an LAURYN of  
2024, Alternate LAURYN Entry   
dating method.  
Indications for NST: Obesity  
Baseline: 135  
Variability: Moderate  
Accelerations: Present 15 X 15  
Decelerations: Variable  
Contractions: TOCO: None  
Interpretation: Reactive  
SIGNATURE: Letty Beaver APRN.CNM                                [facility]  
   
                                                    2024 History of Presen  
t   
illness Narrative                       Formatting of this note might   
be different from the original.  
NST SUMMARY  
  
PROVIDER ASSESSMENT AND   
INTERPRETATION  
  
Karina Lindsay is a 34 year old   
female, , who is at   
32w6d with an LAURYN of 2024,   
Alternate LAURYN Entry dating   
method.  
  
Indications for NST: Obesity  
Baseline: 135  
Variability: Moderate  
Accelerations: Present 15 X 15  
Decelerations: Variable  
Contractions: TOCO: None  
Interpretation: Reactive  
  
SIGNATURE: Letty Beaver APRN.CNM  
  
  
Electronically signed by Letty Beaver APRN.CNM at 2024   
10:32 AM EDT  
documented in this encounter            [facility]  
   
                                        2024 Instructions   
  
  
Kelly Cervantes MA - 2024   
9:13 AM EDTFormatting of this   
note might be different from   
the original.  
SEQUENTIAL SCREENINGS  
The [facility] offers   
sequential screenings for women   
who are interested in   
screenings for chromosomal   
abnormalities and certain birth   
defects during a pregnancy. The   
sequential screen combines   
ultrasound and blood tests to   
determine the risk of   
chromosomal abnormalities,   
including Down's Syndrome   
(Trisomy 21) and Trisomy 18, as   
well as open neural tube   
defects including spina bifida.   
Ultrasound examination is   
performed between 11 weeks and   
13 weeks gestational age. Blood   
tests are drawn after the   
ultrasound and again later in   
the pregnancy between 15 and 21   
weeks gestational age. Please   
let your physician know if you   
are interested in this testing.   
It will require an appointment   
with our ultrasound technician.   
This is not an ultrasound   
performed by a physician in our   
office during a routine visit.  
  
  
SIGNS AND SYMPTOMS OF    
LABOR  
1. Contractions every 10   
minutes or more often  
2. Clear, pink, or brownish   
fluid (water) leaking from   
vagina  
3. Feeling that baby is pushing   
down, pressure  
4. Low, dull backache  
5. Cramps that feel like a   
period  
6. Cramps with or without   
diarrhea  
  
If you notice any of the above   
symptoms, contact our office at   
[phone] and ask to speak   
with a nurse.  
  
After hours, you can call   
[facility] at   
[phone] OR call \[facility]\"" at [phone] and   
ask to have the doctor on call   
paged.  
  
If you consider this an   
emergency, dial [phone] or go to   
your nearest emergency   
department.  
  
NEED HELP? Are you dealing with   
a violent or abusive   
relationship? Are you a victim   
of rape or sexual assult? Call   
[facility] ([facility]   
24 hour Crisis Hotline:   
491.530.9031 or 650-519-9711.  
  
PREGNANCY MANUAL  
Your Guide to a Healthy   
Pregnancy manual is now   
on-line. Visit   
[email or web address]  
[place] to download your   
free copy  
  
Electronically signed by   
Kelly Cervantes MA at   
2024 9:13 AM EDT  
  
documented in this encounter            [facility]  
   
                                        2024 Note     HNO ID: [id number]  
Author: SALVADOR WEAVER RN  
Service: ?  
Author Type: Registered Nurse  
Type: Progress Notes  
Filed: 2024 14:42  
Note Text:  
Patient referred to Blood   
Management for evaluation and   
treatment of  
pre-surgical anemia and/or iron   
deficiency.  
Non-surgical: anemia in   
pregnancy  
Date of surgery: NA  
Medical/Surgical History:  
PAST MEDICAL HISTORY  
Diagnosis Date  
Abnormal Pap smear of cervix  
Cervical dysplasia, mild   
2016  
PAST SURGICAL HISTORY  
Procedure Laterality Date  
COLONOSCOPY 2016  
EXTRACTION, ERUPTED TOOTH OR   
EXPOSED ROOT (ELEVATION AND/OR   
FORCEPS REMOVAL)  
2004  
TONSILLECTOMY AND ADENOIDECTOMY   
HX 2000  
Other significant   
Medical/Surgical history:  
- None  
Current Outpatient Medications  
Medication Sig  
insulin NPH (NOVOLIN N FLEXPEN)   
100 unit/mL (3 mL) injection   
pen Inject 12  
Units subcutaneously daily at   
bedtime.  
omeprazole (PRILOSEC) 20 mg   
capsule Take 1 capsule by mouth   
two times a day.  
insulin needles, DISPOSABLE,   
(PEN NEEDLE) 31 gauge x 5/16  1   
Each once daily.  
Lancets Use as directed to   
check glucose levels up to   
seven times daily.  
alcohol swabs (ALCOHOL PREP   
PADS) Use as directed to check   
glucose levels up to  
seven times daily.  
Blood-Glucose Meter (FREESTYLE   
FREEDOM) monitoring kit 1 Each   
as needed.  
blood sugar diagnostic   
(FREESTYLE TEST) test strip Use   
with blood glucose test  
four times a day.  
ARIPiprazole (ABILIFY) 2 mg   
tablet Take 2 mg by mouth once   
daily. 1mg for 7  
days then 2mg  
sertraline (ZOLOFT) 100 mg   
tablet Take 1 tablet by mouth   
once daily.  
BABY ASPIRIN ORAL Take 81 mg by   
mouth once daily.  
prenatal 21/iron fu/folic acid   
(PRENATAL COMPLETE ORAL) Take   
by mouth.  
No current   
facility-administered   
medications for this visit.  
Current medications that may   
affect iron absorption and/or   
blood loss:  
- Aspirin  
Baseline laboratory values:  
WBC (k/uL)  
Date Value  
2024 13.99 (H)  
RBC (m/uL)  
Date Value  
2024 3.37 (L)  
Hemoglobin (g/dL)  
Date Value  
2024 10.3 (L)  
Hematocrit (%)  
Date Value  
2024 31.2 (L)  
MCV (fL)  
Date Value  
2024 92.6  
MCH (pg)  
Date Value  
2024 30.6  
MCHC (g/dL)  
Date Value  
2024 33.0  
RDW-CV (%)  
Date Value  
2024 15.6 (H)  
Platelet Count (k/uL)  
Date Value  
2024 284  
MPV (fL)  
Date Value  
2024 9.8  
Iron  
Date Value Ref Range Status  
2024 110 41 - 186 ug/dL   
Final  
TIBC  
Date Value Ref Range Status  
2024 500 (H) 232 - 386   
ug/dL Final  
Ferritin  
Date Value Ref Range Status  
2024 31.3 14.7 - 205.1   
ng/mL Final  
Transferrin Saturation  
Date Value Ref Range Status  
2024 22.0 15.0 - 57.0 %   
Final  
Assess for the need to augment   
a patient?s natural red blood   
cell production:  
- Blood transfusion avoidance  
- Iron depletion  
Recommendations according to   
Blood Management patient care   
guidelines:  
- Iron Sucrose 200 mg, IV   
infusion, dose(s) 3  
total iron deficit using   
Ganzoni equation = 643   
(pre- wt 99 kg/goal hgb  
11 g/dL)  
Clinical information is sent to   
a provider for review and   
evaluation for  
treatment.                              [facility]  
   
                                                    2024 History of Presen  
t   
illness Narrative                       Formatting of this note is   
different from the original.  
Patient referred to Blood   
Management for evaluation and   
treatment of pre-surgical   
anemia and/or iron deficiency.  
  
Non-surgical: anemia in   
pregnancy  
  
Date of surgery: NA  
  
Medical/Surgical History:  
PAST MEDICAL HISTORY  
Diagnosis Date  
Abnormal Pap smear of cervix  
Cervical dysplasia, mild   
2016  
  
PAST SURGICAL HISTORY  
Procedure Laterality Date  
COLONOSCOPY 2016  
EXTRACTION, ERUPTED TOOTH OR   
EXPOSED ROOT (ELEVATION AND/OR   
FORCEPS REMOVAL) 2004  
TONSILLECTOMY AND ADENOIDECTOMY   
HX 2000  
  
Other significant   
Medical/Surgical history:  
- None  
  
Current Outpatient Medications  
Medication Sig  
insulin NPH (NOVOLIN N FLEXPEN)   
100 unit/mL (3 mL) injection   
pen Inject 12 Units   
subcutaneously daily at   
bedtime.  
omeprazole (PRILOSEC) 20 mg   
capsule Take 1 capsule by mouth   
two times a day.  
insulin needles, DISPOSABLE,   
(PEN NEEDLE) 31 gauge x 5/16  1   
Each once daily.  
Lancets Use as directed to   
check glucose levels up to   
seven times daily.  
alcohol swabs (ALCOHOL PREP   
PADS) Use as directed to check   
glucose levels up to seven   
times daily.  
Blood-Glucose Meter (FREESTYLE   
FREEDOM) monitoring kit 1 Each   
as needed.  
blood sugar diagnostic   
(FREESTYLE TEST) test strip Use   
with blood glucose test four   
times a day.  
ARIPiprazole (ABILIFY) 2 mg   
tablet Take 2 mg by mouth once   
daily. 1mg for 7 days then 2mg  
sertraline (ZOLOFT) 100 mg   
tablet Take 1 tablet by mouth   
once daily.  
BABY ASPIRIN ORAL Take 81 mg by   
mouth once daily.  
prenatal 21/iron fu/folic acid   
(PRENATAL COMPLETE ORAL) Take   
by mouth.  
  
No current   
facility-administered   
medications for this visit.  
  
Current medications that may   
affect iron absorption and/or   
blood loss:  
- Aspirin  
  
Baseline laboratory values:  
WBC (k/uL)  
Date Value  
2024 13.99 (H)  
  
RBC (m/uL)  
Date Value  
2024 3.37 (L)  
  
Hemoglobin (g/dL)  
Date Value  
2024 10.3 (L)  
  
Hematocrit (%)  
Date Value  
2024 31.2 (L)  
  
MCV (fL)  
Date Value  
2024 92.6  
  
MCH (pg)  
Date Value  
2024 30.6  
  
MCHC (g/dL)  
Date Value  
2024 33.0  
  
RDW-CV (%)  
Date Value  
2024 15.6 (H)  
  
Platelet Count (k/uL)  
Date Value  
2024 284  
  
MPV (fL)  
Date Value  
2024 9.8  
  
Iron  
Date Value Ref Range Status  
2024 110 41 - 186 ug/dL   
Final  
  
TIBC  
Date Value Ref Range Status  
2024 500 (H) 232 - 386   
ug/dL Final  
  
Ferritin  
Date Value Ref Range Status  
2024 31.3 14.7 - 205.1   
ng/mL Final  
  
Transferrin Saturation  
Date Value Ref Range Status  
2024 22.0 15.0 - 57.0 %   
Final  
  
Assess for the need to augment   
a patient s natural red blood   
cell production:  
- Blood transfusion avoidance  
- Iron depletion  
  
Recommendations according to   
Blood Management patient care   
guidelines:  
- Iron Sucrose 200 mg, IV   
infusion, dose(s) 3  
total iron deficit using   
Ganzoni equation = 643   
(pre- wt 99 kg/goal hgb   
11 g/dL)  
  
Clinical information is sent to   
a provider for review and   
evaluation for treatment.  
Electronically signed by Salvador Weaver RN at 2024   
2:42 PM EDT  
documented in this encounter            [facility]  
   
                                                    2024 Telephone   
encounter Note                          Formatting of this note might   
be different from the original.  
Pt returned call and given   
below results and instructions.   
Pt voiced understanding. Chart   
forwarded to Blood management.   
Will monitor for pt to be   
scheduled. Kasey Osuna LPN'  
Electronically signed by   
Kasey Osuna LPN at   
2024 8:23 AM EDT  
                                        [facility]  
   
                                                    2024 Miscellaneous   
Notes                                   Formatting of this note might   
be different from the original.  
Pt returned call and given   
below results and instructions.   
Pt voiced understanding. Chart   
forwarded to Blood management.   
Will monitor for pt to be   
scheduled. Kasey Osuna LPN'  
Electronically signed by   
Kasey Osuna LPN at   
2024 8:23 AM EDT  
Formatting of this note might   
be different from the original.  
Left message for patient to   
call office.  
Narda Blanco RN  
  
Electronically signed by   
Narda Blanco RN at   
2024 4:49 PM EDT  
Formatting of this note might   
be different from the original.  
----- Message from Shayna Escalera MD sent at 2024   
4:43 PM EDT -----  
Add to prenatal record  
No improvement in her anemia.   
Blood management referral   
placed  
Electronically signed by   
Narda Blanco RN at   
2024 4:48 PM EDT  
documented in this encounter            [facility]  
   
                                                    2024 Telephone   
encounter Note                          Formatting of this note might   
be different from the original.  
Left message for patient to   
call office.  
Narda Blanco RN  
  
Electronically signed by   
Narda Blanco RN at   
2024 4:49 PM EDT  
                                        [facility]  
   
                                                    2024 Telephone   
encounter Note                          Formatting of this note might   
be different from the original.  
----- Message from Shayna Escalera MD sent at 2024   
4:43 PM EDT -----  
Add to prenatal record  
No improvement in her anemia.   
Blood management referral   
placed  
Electronically signed by   
Narda Blanco, RN at   
2024 4:48 PM EDT  
                                        [facility]  
   
                                                    2024 Telephone   
encounter Note                          Formatting of this note might   
be different from the original.  
See result note  
Blood management referral   
placed  
Electronically signed by   
Shayna Escalera MD at 2024   
4:43 PM EDT  
                                        [facility]  
   
                                                    2024 Miscellaneous   
Notes                                   Formatting of this note might   
be different from the original.  
See result note  
Blood management referral   
placed  
Electronically signed by   
Shayna Escalera MD at 2024   
4:43 PM EDT  
documented in this encounter            [facility]  
   
                                        2024 Note       
Indication  
Evaluation of fetal growth and   
fetal well-being  
Obesity, BMI >30, Gestational   
diabetes  
  
Impression  
REMOTE READ  
- Single, live, intrauterine   
pregnancy.  
- The fetal biometry is   
consistent with the assigned   
gestational dating.  
- The EFW is 2609 g, at the   
99%. AC is at the >99%.  
- The amniotic fluid volume is   
normal amount with an MVP of 7   
cm and an CHRIS of  
21.8 cm  
- The placenta is posterior,   
fundal.  
- BPP .  
- No malformations visualized   
on a limited survey as detailed   
below.  
  
Recommendations  
Continue twice weekly    
testing  
  
Maternal Assessment  
Height 168 cm  
Height (ft) 5 ft  
Height (in) 6 in  
Physical Exam  
Initial weight (lb) 218 lb  
Initial BMI 35.19 kg/m  
Maternal assessment other:   
 1 Para 0  
  
Method  
Transabdominal ultrasound   
examination  
  
Pregnancy  
Conner pregnancy. Number of   
fetuses: 1  
  
Dating  
LMP on: 2023  
GA by LMP 32 w + 2 d  
LAURYN by LMP: 2024  
GA by prior assessment 32 w + 2   
d  
LAURYN by prior assessment:   
2024  
Ultrasound examination on:   
2024  
GA by U/S based upon: AC, BPD,   
Femur, HC  
GA by U/S 34 w + 5 d  
LAURYN by U/S: 2024  
Assigned: based on stated LAURYN,   
selected on 2024  
Assigned GA 32 w + 2 d  
Assigned LAURYN: 2024  
  
General Evaluation  
Cardiac activity present. FHR   
146 bpm. Fetal movements:   
present. Presentation:  
breech  
Placenta: Placental site:   
posterior, fundal  
Umbilical cord: Cord vessels: 3   
vessel cord  
Amniotic fluid: Amount of AF:   
normal amount. MVP 7.0 cm. CHRIS   
21.8 cm. Q1 5.3 cm,  
Q2 7.0 cm, Q3 3.6 cm, Q4 5.9 cm  
  
Biophysical Profile  
2: Fetal breathing movements  
2: Gross body movements  
2: Fetal tone  
2: Amniotic fluid volume  
 Biophysical profile score  
  
Fetal Growth Overview  
Exam date GA BPD (mm) HC (mm)   
AC (mm)  
FL (mm) HL (mm) EFW (g)  
2024 21w 2d 50.3 46% 193.6   
59% 195.1  
99% 35.7 67% 35.5 79% 531 97%  
2024 32w 2d 85.8 94%   
323.3 95% 322.5  
>99% 62.8 69% 2609 99%  
  
Fetal Biometry  
Standard  
BPD 85.8 mm 34w 4d 94% Hadlock  
.4 mm -/- 96% Nicolaides  
.3 mm 35w 5d 95% Lidia  
.5 mm 36w 1d >99% Hadlock  
Femur 62.8 mm 32w 2d 69% Lidia  
EFW 2,609 g 35w 0d 99% Hadlock  
EFW (lb) 5 lb  
EFW (oz) 12 oz  
EFW by: Hadlock (HC-AC-FL)  
Extended  
 6.3 mm  
Extremities / Bony Struc  
FL / HC 0.19  
Other Structures  
 bpm  
  
Fetal Anatomy  
Lateral ventricles: normal  
Cavum septi pellucidi: normal  
Cerebellum: normal  
Cisterna magna: normal  
4-chamber view: normal  
RVOT view: normal  
LVOT view: normal  
3-vessel view: normal  
Heart / Thorax  
Situs: situs solitus (normal)  
Diaphragm: normal  
Stomach: normal  
Kidneys: normal  
Bladder: normal  
Cervical spine: normal  
Thoracic spine: normal  
Lumbar spine: normal  
Sacral spine: normal  
Gender: Unspecified  
Wants to know fetal sex: no  
  
Performed By: Maisha Iqbal RDMS, RVT  
  
Read By: Jasmeet Alex D.O.            [facility]  
   
                                                    2024 Telephone   
encounter Note                          Formatting of this note might   
be different from the original.  
Faxed  
Electronically signed by   
Narda Blanco RN at   
2024 2:06 PM EDT  
                                        [facility]  
   
                                                    2024 Miscellaneous   
Notes                                   Formatting of this note might   
be different from the original.  
Faxed  
Electronically signed by   
Narda Blanco RN at   
2024 2:06 PM EDT  
Formatting of this note might   
be different from the original.  
signed  
Electronically signed by   
Shayna Escalera MD at 2024   
1:42 PM EDT  
Formatting of this note might   
be different from the original.  
32w2d  
Received prescription request   
from [facility] for a   
Dexcom glucose monitoring   
system. RX to SW to review.   
Patient seen in office with CP   
today.  
Narda Blanco RN  
  
Electronically signed by   
Narda Blanco RN at   
2024 12:55 PM EDT  
documented in this encounter            [facility]  
   
                                                    2024 Telephone   
encounter Note                          Formatting of this note might   
be different from the original.  
signed  
Electronically signed by   
Shayna Escalera MD at 2024   
1:42 PM EDT  
                                        [facility]  
   
                                                    2024 Telephone   
encounter Note                          Formatting of this note might   
be different from the original.  
32w2d  
Received prescription request   
from [facility] for a   
Dexcom glucose monitoring   
system. RX to SW to review.   
Patient seen in office with CP   
today.  
Narda Blanco RN  
  
Electronically signed by   
Narda Blanco RN at   
2024 12:55 PM EDT  
                                        [facility]  
   
                                        2024 Progress note Formatting of t  
his note might   
be different from the original.  
S: Karina Lindsay is a 34 year old   
female who presents at 32 weeks   
gestation for a routine visit.   
Just completed BPP & Growth-   
EFW 99%. Started using NPH 8   
units at bedtime 2 weeks ago.   
Reviewed blood glucose levels.   
All fasting levels are   
elevated. She works night shift   
3x week and is eating over   
night at times. Positive fetal   
movement. Denies headache,   
visual changes, chest pain,   
shortness of breath, vaginal   
bleeding, leakage of fluid, or   
dysuria. Feeling well, no   
complaints.  
  
O: See flow sheet  
Gen: No apparent distress  
Abd: Gravid, nontender  
  
ASSESSMENT/PLAN:  
1. GDM, class A2 - ICD9:   
648.80, V58.67, ICD10: O24.419   
(primary diagnosis)  
  
2. Encounter for supervision of   
high risk pregnancy in third   
trimester, antepartum - ICD9:   
V23.9, ICD10: O09.93  
3. 32 weeks gestation of   
pregnancy - ICD9: V22.2, ICD10:   
Z3A.3  
  
4. Anemia during pregnancy in   
third trimester - ICD9: 648.23,   
ICD10: O99.013  
  
5. Obesity affecting pregnancy   
in second trimester,   
unspecified obesity type -   
ICD9: 649.13, ICD10: O99.212  
  
- Blood glucose readings   
reviewed with SW- Insulin dose   
increased to NPH 12 units /   
bedtime- RX sent  
- Patient aware she will be   
physician only patient  
- Biweekly  testing-   
already scheduled  
- Patient met with diabetic /   
educator  
- Iron levels drawn today  
- Discussed trying to continue   
to eat meals at the same times   
daily regardless of work hours/   
may snack at work  
- RTO 2 weeks or sooner  
Janet Lilly APRN.CNM  
  
  
Electronically signed by   
Janet Lilly APRN.CNM at   
2024 10:26 AM EDT  
                                        [facility]  
   
                                                    2024 Miscellaneous   
Notes                                   Formatting of this note might   
be different from the original.  
S: Karina Lindsay is a 34 year old   
female who presents at 32 weeks   
gestation for a routine visit.   
Just completed BPP & Growth-   
EFW 99%. Started using NPH 8   
units at bedtime 2 weeks ago.   
Reviewed blood glucose levels.   
All fasting levels are   
elevated. She works night shift   
3x week and is eating over   
night at times. Positive fetal   
movement. Denies headache,   
visual changes, chest pain,   
shortness of breath, vaginal   
bleeding, leakage of fluid, or   
dysuria. Feeling well, no   
complaints.  
  
O: See flow sheet  
Gen: No apparent distress  
Abd: Gravid, nontender  
  
ASSESSMENT/PLAN:  
1. GDM, class A2 - ICD9:   
648.80, V58.67, ICD10: O24.419   
(primary diagnosis)  
  
2. Encounter for supervision of   
high risk pregnancy in third   
trimester, antepartum - ICD9:   
V23.9, ICD10: O09.93  
3. 32 weeks gestation of   
pregnancy - ICD9: V22.2, ICD10:   
Z3A.3  
  
4. Anemia during pregnancy in   
third trimester - ICD9: 648.23,   
ICD10: O99.013  
  
5. Obesity affecting pregnancy   
in second trimester,   
unspecified obesity type -   
ICD9: 649.13, ICD10: O99.212  
  
- Blood glucose readings   
reviewed with SW- Insulin dose   
increased to NPH 12 units /   
bedtime- RX sent   
- Patient aware she will be   
physician only patient  
- Biweekly  testing-   
already scheduled  
- Patient met with diabetic /   
educator  
- Iron levels drawn today  
- Discussed trying to continue   
to eat meals at the same times   
daily regardless of work hours/   
may snack at work  
- RTO 2 weeks or sooner  
Janet Lilly APRN.CNM  
  
  
Electronically signed by   
Janet Lilly APRN.CNM at   
2024 10:26 AM EDT  
documented in this encounter            [facility]  
   
                                        2024 Instructions   
  
  
Tobin Florez MA - 2024   
9:43 AM EDTFormatting of this   
note might be different from   
the original.  
SEQUENTIAL SCREENINGS  
The [facility] offers   
sequential screenings for women   
who are interested in   
screenings for chromosomal   
abnormalities and certain birth   
defects during a pregnancy. The   
sequential screen combines   
ultrasound and blood tests to   
determine the risk of   
chromosomal abnormalities,   
including Down's Syndrome   
(Trisomy 21) and Trisomy 18, as   
well as open neural tube   
defects including spina bifida.   
Ultrasound examination is   
performed between 11 weeks and   
13 weeks gestational age. Blood   
tests are drawn after the   
ultrasound and again later in   
the pregnancy between 15 and 21   
weeks gestational age. Please   
let your physician know if you   
are interested in this testing.   
It will require an appointment   
with our ultrasound technician.   
This is not an ultrasound   
performed by a physician in our   
office during a routine visit.  
  
  
SIGNS AND SYMPTOMS OF    
LABOR  
1. Contractions every 10   
minutes or more often  
2. Clear, pink, or brownish   
fluid (water) leaking from   
vagina  
3. Feeling that baby is pushing   
down, pressure  
4. Low, dull backache  
5. Cramps that feel like a   
period  
6. Cramps with or without   
diarrhea  
  
If you notice any of the above   
symptoms, contact our office at   
[phone] and ask to speak   
with a nurse.  
  
After hours, you can call   
doctors registry at   
[phone] OR call \[facility]\"" at [phone] and   
ask to have the doctor on call   
paged.  
  
If you consider this an   
emergency, dial 9-1-1 or go to   
your nearest emergency   
department.  
  
NEED HELP? Are you dealing with   
a violent or abusive   
relationship? Are you a victim   
of rape or sexual assult? Call   
[facility] ([place])   
24 hour Crisis Hotline:   
985.258.6718 or 480-180-2828.  
  
PREGNANCY MANUAL  
Your Guide to a Healthy   
Pregnancy manual is now   
on-line. Visit   
[email or web address]  
nancyGuide to download your   
free copy  
  
Electronically signed by Tobin Florez MA at 2024 9:43   
AM EDT  
  
documented in this encounter            [facility]  
   
                                                    2024 Telephone   
encounter Note                          Formatting of this note might   
be different from the original.  
Refill request received via   
[employer]. Patient 31w3d, next   
appointment is on .  
  
Maisha Jarrett RN  
  
Electronically signed by   
Maisha Jarrett, RN at   
2024 9:41 AM EDT  
                                        [facility]  
   
                                                    2024 Miscellaneous   
Notes                                   Formatting of this note might   
be different from the original.  
Refill request received via   
[employer]. Patient 31w3d, next   
appointment is on .  
  
Maisha Jarrett RN  
  
Electronically signed by   
Maisha Jarrett, RN at   
2024 9:41 AM EDT  
documented in this encounter            [facility]  
   
                                                    06- Telephone   
encounter Note                          Formatting of this note might   
be different from the original.  
FMLA paperwork completed, copy   
scanned into EMR and filed in   
nurses station. Original placed   
in nurses station to be given   
to pt at her next visit.   
Kasey Osuna LPN'  
  
Electronically signed by   
Kasey Osuna LPN at   
06/10/2024 2:52 PM EDT  
                                        [facility]  
   
                                                    06- Miscellaneous   
Notes                                   Formatting of this note might   
be different from the original.  
FMLA paperwork completed, copy   
scanned into EMR and filed in   
nurses station. Original placed   
in nurses station to be given   
to pt at her next visit.   
Kasey Osuna LPN'  
  
Electronically signed by   
Kasey Osuna LPN at   
06/10/2024 2:52 PM EDT  
documented in this encounter            [facility]  
   
                                        06- Progress note Formatting of t  
his note might   
be different from the original.  
SW- pt doing well. No pain, vb,   
lof. Good FM  
PE:  
Gen- NAD, well appearing  
Abd- Soft, gravid, NT  
See flowsheet  
A/p 30 wk gestation  
- Anemia: Recheck CBC next   
visit. Orders placed  
- A2GDM: Start NPH 8 units   
bedtime given all fasting BG's   
are elevated. BG log reviewed.   
Patient is an NP and declines   
insulin teaching. Twice weekly   
NST's ordered and to have   
growth US q 4 weeks. Discussed   
risks of GDM with pregnancy and   
delivery  
- RTO 2 weeks  
Shayna Escalera DO  
Electronically signed by   
Shayna Escalera MD at 06/10/2024   
8:03 PM EDT  
                                        [facility]  
   
                                                    06- Miscellaneous   
Notes                                   Formatting of this note might   
be different from the original.  
SW- pt doing well. No pain, vb,   
lof. Good FM  
PE:  
Gen- NAD, well appearing  
Abd- Soft, gravid, NT  
See flowsheet  
A/p 30 wk gestation  
- Anemia: Recheck CBC next   
visit. Orders placed  
- A2GDM: Start NPH 8 units   
bedtime given all fasting BG's   
are elevated. BG log reviewed.   
Patient is an NP and declines   
insulin teaching. Twice weekly   
NST's ordered and to have   
growth US q 4 weeks. Discussed   
risks of GDM with pregnancy and   
delivery  
- RTO 2 weeks  
Shayna Escalera DO  
Electronically signed by   
Shayna Escalera MD at 06/10/2024   
8:03 PM EDT  
documented in this encounter            [facility]  
   
                                        06- Instructions   
  
  
Kelly Cervantes MA - 06/10/2024   
1:42 PM EDTFormatting of this   
note might be different from   
the original.  
SEQUENTIAL SCREENINGS  
The [facility] offers   
sequential screenings for women   
who are interested in   
screenings for chromosomal   
abnormalities and certain birth   
defects during a pregnancy. The   
sequential screen combines   
ultrasound and blood tests to   
determine the risk of   
chromosomal abnormalities,   
including Down's Syndrome   
(Trisomy 21) and Trisomy 18, as   
well as open neural tube   
defects including spina bifida.   
Ultrasound examination is   
performed between 11 weeks and   
13 weeks gestational age. Blood   
tests are drawn after the   
ultrasound and again later in   
the pregnancy between 15 and 21   
weeks gestational age. Please   
let your physician know if you   
are interested in this testing.   
It will require an appointment   
with our ultrasound technician.   
This is not an ultrasound   
performed by a physician in our   
office during a routine visit.  
  
  
SIGNS AND SYMPTOMS OF    
LABOR  
1. Contractions every 10   
minutes or more often  
2. Clear, pink, or brownish   
fluid (water) leaking from   
vagina  
3. Feeling that baby is pushing   
down, pressure  
4. Low, dull backache  
5. Cramps that feel like a   
period  
6. Cramps with or without   
diarrhea  
  
If you notice any of the above   
symptoms, contact our office at   
[phone] and ask to speak   
with a nurse.  
  
After hours, you can call   
doctors registry at   
[phone] OR call \[facility]\"" at [phone] and   
ask to have the doctor on call   
paged.  
  
If you consider this an   
emergency, dial 9-1-6 or go to   
your nearest emergency   
department.  
  
NEED HELP? Are you dealing with   
a violent or abusive   
relationship? Are you a victim   
of rape or sexual assult? Call   
[facility] ([place])   
24 hour Crisis Hotline:   
280.210.1874 or 347-754-2407.  
  
PREGNANCY MANUAL  
Your Guide to a Healthy   
Pregnancy manual is now   
on-line. Visit   
[email or web address]  
[place] to download your   
free copy  
  
Electronically signed by   
Kelly Cervantes MA at   
06/10/2024 1:42 PM EDT  
  
documented in this encounter            [facility]  
   
                                        2024 Note     HNO ID: [id number]  
Author: SARAH DE LA CRUZ RN  
Service: ?  
Author Type: Registered Nurse  
Type: Progress Notes  
Filed: 2024 16:01  
Note Text:  
DIABETES SELF-MANAGEMENT   
EDUCATION AND SUPPORT  
Location: [facility]  
Type of visit: Virtual (with   
video) individual  
I have communicated my name and   
active licensure. The patient's   
identity and  
physical location were verified   
at the time of this visit.   
Either the patient or  
their legal representative has   
been informed of the risks and   
benefits of -- and  
alternatives to -- treatment   
through a remote evaluation and   
consents to proceed  
with the evaluation remotely.  
Types of DSMES:   
Initial/Comprehensive (add to   
or update ADA spreadsheet)  
PATIENT'S MAIN CONCERN TODAY:   
target goals for BG  
Support person present for   
education today: none  
Cognitive ability: Alert and   
oriented  
Motivation to learn: Interested  
Learning barriers identified by   
educator: none  
Method of instruction: written   
and verbal  
INTERVENTIONS/TOPICS COVERED:  
-Diabetes Pathophysiology:   
gestational diabetes basics  
-Monitoring: BG targets, using   
a home glucose monitor, and   
testing frequency  
-Healthy Eating: impact of   
carbs on BG, Plate Method,   
basic carb counting, foods  
with carbs, portion sizes,   
reading food labels, and   
recommendation for 30 grams  
carb at breakfast, 15-30 grams   
for daytime snacks, 30 grams   
for bedtime snack,  
45 to 60 grams for lunch and   
dinner, include healthy source   
of protein with all  
meals and snacks  
-Physical Activity: benefits of   
exercise and types of exercise  
-Acute Complications:   
hypoglycemia s/sx/tx and   
hyperglycemia s/sx/tx  
DIABETES ASSESSMENT:  
Referring Physician: Bobbi Jalloh  
Previous Diabetes Education?   
No, did meet with dietitian,   
reviewed meal plan  
today  
What are you hoping to gain   
from this visit? Not aware of   
BG targets  
In your words, what is   
gestational diabetes? The   
body's resistance to insulin  
during pregnancy for many   
reasons  
What concerns you about having   
gestational diabetes? No   
specific concerns  
Diabetes History:  
Type of Diabetes: Gestational (   
diabetes in pregnancy)  
How far along is your   
pregnancy? Weeks: 31  
Does anyone in your family have   
diabetes? yes maternal side,   
grandfather and  
aunts  
How do you learn best?listening   
and reading  
Demographics:  
Highest level of education:   
Post-graduate degree, Nurse   
Practioner at [employer]  
Race/Ethnic Origin:   
White/  
Does you culture or Latter day   
require any of the following:   
No cultural/Holiness  
practices affecting DM  
Do you have problems with: No   
difficulty   
seeing/hearing/reading/writing/  
speaking  
Occupation: nurse practitioner  
Work hours: asked/not answered  
Support System:  
How often does someone help you   
read hospital materials? never  
How often does someone help you   
read your pill bottles? never  
How often does someone have to   
help you take care of your   
diabetes? never  
Major stressors:  father of   
baby currently inpatient rehab,   
concerned when he is  
released will want to be   
involved in her life again but   
she has informed him it  
is over unless significant   
changes made by him .   
Questioned if she is concerned  
about her safety and she   
denied,  I don't think he would   
be reactive, but you  
never know.  Recommended she   
inform her OB/GYN or PCP if she   
feels threatened,  
or this educator can assist   
with referral to social   
services/resources. She  
declined at this time.  
How do you manage stress?   
Mother is supportive, likes to   
work on crafts.  
Do any of the following things   
get in the way of managing your   
diabetes? Nothing  
gets in the way of DM   
management  
Health History:  
Do you use tobacco? No  
Do you use alcohol? No  
In the past 12 months have you   
had any:  
Hospital Admissions: Asked/not   
answered  
ER Visits: Asked/not answered  
Primary Care Visits: Asked/not   
answered  
What are your general feelings   
about you overall health?   
Asked/not answered  
Medical Issues/Complications:   
presently low iron levels,   
history of depression  
To whom are you reporting your   
blood sugar levels? High-Risk   
OB  
PAST MEDICAL HISTORY  
Diagnosis Date  
Abnormal Pap smear of cervix  
Cervical dysplasia, mild   
2016  
Most recent A1C  
Lab Results  
Component Value Date  
HBA1C 5.0 2024  
Physical Activity:  
Do you do a regular exercise?   
No, has a farm and very active   
in maintaining but  
feeling low energy in last   
trimester  
Sleep:  
Do you get at least 7 hrs of   
sleep most nights? yes  
Current Outpatient Medications  
Medication Sig  
Lancets Use as directed to   
check glucose levels up to   
seven times daily.  
alcohol swabs (ALCOHOL PREP   
PADS) Use as directed to check   
glucose levels up to  
seven times daily.  
Blood-Glucose Meter (FREESTYLE   
FREEDOM) monitoring kit 1 Each   
as needed.  
blood sugar diagnostic   
(FREESTYLE TEST) test strip Use   
with blood glucose test  
four times a day.  
ARIPiprazole (ABILIFY) 2 mg   
tablet Take 2 mg by mouth once   
daily. 1mg for 7  
days then 2mg  
sertraline (ZOLOFT) 100 mg   
tablet Take 1 tablet by mouth   
(more content not included)...          [facility]  
   
                                                    2024 History of Presen  
t   
illness Narrative                       Formatting of this note is   
different from the original.  
DIABETES SELF-MANAGEMENT   
EDUCATION AND SUPPORT  
  
Location: [facility]  
  
Type of visit: Virtual (with   
video) individual  
  
I have communicated my name and   
active licensure. The patient's   
identity and physical location   
were verified at the time of   
this visit. Either the patient   
or their legal representative   
has been informed of the risks   
and benefits of -- and   
alternatives to -- treatment   
through a remote evaluation and   
consents to proceed with the   
evaluation remotely.  
  
Types of DSMES:   
Initial/Comprehensive (add to   
or update ADA spreadsheet)  
  
PATIENT'S MAIN CONCERN TODAY:   
target goals for BG  
  
Support person present for   
education today: none  
  
Cognitive ability: Alert and   
oriented  
  
Motivation to learn: Interested  
  
Learning barriers identified by   
educator: none  
  
Method of instruction: written   
and verbal  
  
INTERVENTIONS/TOPICS COVERED:  
-Diabetes Pathophysiology:   
gestational diabetes basics  
-Monitoring: BG targets, using   
a home glucose monitor, and   
testing frequency  
-Healthy Eating: impact of   
carbs on BG, Plate Method,   
basic carb counting, foods with   
carbs, portion sizes, reading   
food labels, and recommendation   
for 30 grams carb at breakfast,   
15-30 grams for daytime snacks,   
30 grams for bedtime snack, 45   
to 60 grams for lunch and   
dinner, include healthy source   
of protein with all meals and   
snacks  
-Physical Activity: benefits of   
exercise and types of exercise  
-Acute Complications:   
hypoglycemia s/sx/tx and   
hyperglycemia s/sx/tx  
  
DIABETES ASSESSMENT:  
Referring Physician: Bobbi Jalloh  
  
Previous Diabetes Education?   
No, did meet with dietitian,   
reviewed meal plan today  
What are you hoping to gain   
from this visit? Not aware of   
BG targets  
In your words, what is   
gestational diabetes? The   
body's resistance to insulin   
during pregnancy for many   
reasons  
What concerns you about having   
gestational diabetes? No   
specific concerns  
  
Diabetes History:  
Type of Diabetes: Gestational (   
diabetes in pregnancy)  
How far along is your   
pregnancy? Weeks: 31  
Does anyone in your family have   
diabetes? yes maternal side,   
grandfather and aunts  
How do you learn best?listening   
and reading  
  
Demographics:  
Highest level of education:   
Post-graduate degree, Nurse   
Practioner at [employer]  
Race/Ethnic Origin:   
White/  
Does you culture or Latter day   
require any of the following:   
No cultural/Holiness practices   
affecting DM  
Do you have problems with: No   
difficulty   
seeing/hearing/reading/writing/  
speaking  
Occupation: nurse practitioner  
Work hours: asked/not answered  
  
Support System:  
How often does someone help you   
read hospital materials? never  
How often does someone help you   
read your pill bottles? never  
How often does someone have to   
help you take care of your   
diabetes? never  
  
Major stressors:  father of   
baby currently inpatient rehab,   
concerned when he is released   
will want to be involved in her   
life again but she has informed   
him it is over unless   
significant changes made by   
him . Questioned if she is   
concerned about her safety and   
she denied,  I don't think he   
would be reactive, but you   
never know.  Recommended she   
inform her OB/GYN or PCP if she   
feels threatened, or this   
educator can assist with   
referral to social   
services/resources. She   
declined at this time.  
How do you manage stress?   
Mother is supportive, likes to   
work on crafts.  
Do any of the following things   
get in the way of managing your   
diabetes? Nothing gets in the   
way of DM management  
  
Health History:  
Do you use tobacco? No  
Do you use alcohol? No  
In the past 12 months have you   
had any:  
Hospital Admissions: Asked/not   
answered  
ER Visits: Asked/not answered  
Primary Care Visits: Asked/not   
answered  
What are your general feelings   
about you overall health?   
Asked/not answered  
Medical Issues/Complications:   
presently low iron levels,   
history of depression  
To whom are you reporting your   
blood sugar levels? High-Risk   
OB  
  
PAST MEDICAL HISTORY  
Diagnosis Date  
Abnormal Pap smear of cervix  
Cervical dysplasia, mild   
2016  
  
Most recent A1C  
Lab Results  
Component Value Date  
HBA1C 5.0 2024  
  
Physical Activity:  
Do you do a regular exercise?   
No, has a farm and very active   
in maintaining but feeling low   
energy in last trimester  
  
Sleep:  
Do you get at least 7 hrs of   
sleep most nights? yes  
  
Current Outpatient Medications  
Medication Sig  
Lancets Use as directed to   
check glucose levels up to   
seven times daily.  
alcohol swabs (ALCOHOL PREP   
PADS) Use as directed to check   
glucose levels up to seven   
times daily.  
Blood-Glucose Meter (FREESTYLE   
FREEDOM) monitoring kit 1 Each   
as needed.  
blood sugar diagnostic   
(FREESTYLE TEST) test strip Use   
with blood glucose test four   
times a day.  
ARIPiprazole (ABILIFY) 2 mg   
tablet Take 2 mg by mouth once   
daily. 1mg for 7 days then 2mg  
sertraline (ZOLOFT) 100 mg   
tablet Take 1 tablet by mouth   
once daily.  
omeprazole (PRILOSEC) 20 mg   
capsule Take 1 capsule by mouth   
two times a day.  
BABY ASPIRIN ORAL Take 81 mg by   
mouth once daily.  
prenatal 21/iron fu/folic acid   
(PRENATAL COMPLETE ORAL) Take   
by mouth.  
  
No current   
facility-administered   
medications for this visit.  
  
Medications for Diabetes:  
  
Name of Medication Dose When   
taken How often missed  
none  
  
  
  
  
  
Injections Technique:  
Do you take insulin or a   
medication you inject for your   
diabetes? No  
  
Blood Sugar Monitoring:  
Do you have a blood sugar   
monitor? Yes; What kind of   
meter is it? [employer] (bought out   
of pocket, was cheaper than   
co-pay for meter covered by   
insurance) How often do you   
check? 4 times a day When you   
check? Before breakfast and   
Other one hour PP. Do you log   
your blood sugars? Yes. Where   
do you throw away your lancets?   
Sharps container Just started   
testing yesterday:  
Date Fasting AM 1 hr PP Before   
Lunch 1 hr PP Before Dinner 1   
hr PP Bedtime  
 [phone] 138  
 105 128  
  
  
Management of Low Blood Sugar:  
What has been your lowest blood   
sugar in the last month? Just   
started testing  
What are your symptoms of   
lows?Asked/not answered  
How do you treat lows?   
asked/not answered  
Do you drive? yes  
  
Management of High Blood Sugar:  
What has been you highest blood   
sugar in the last month? 153  
What are your symptoms of   
highs? Asked/not answered  
How do you treat your highs?   
asked/not answered  
  
Meal Planning:  
Are you currently following any   
meal plan? Carb counting  
Who does the cooking in your   
house? Self  
Who does the grocery shopping?   
Self  
How often do you eat out?   
0-1x/week  
How many meals do you eat per   
day? Three. Breakfast: eggs   
(hard-boiled or fried, may have   
yogurt, oatmeal or a bagel),   
Lunch: fried ham and cheese   
sandwith and salad, Dinner:   
similar to lunch, last night   
had scrambled eggs, chees and   
mejia wrap. Likes all types of   
fruit, typically not snacking   
in between meals  
Which meals do you tend to   
skip? None  
Beverages: water and coffee   
with collagen and stevia or nut   
based creamer, may have an   
electrolyte replacement   
beverage  
  
EDUCATION HANDOUTS:  
Healthy You: Diabetes and   
Pregnancy  
  
LEARNING RESPONSE:  
Diabetes pathophysiology:   
Demonstrated   
understanding/competency today   
or at previous visit  
Healthy eating: Demonstrated   
understanding/competency today   
or at previous visit  
Being active: Demonstrated   
understanding/competency today   
or at previous visit  
Taking medications: Not   
applicable for this patient  
Monitoring glucose:   
Demonstrated   
understanding/competency today   
or at previous visit  
Acute complications:   
Demonstrated   
understanding/competency today   
or at previous visit  
Chronic complications:   
Demonstrated   
understanding/competency today   
or at previous visit  
Healthy coping: Demonstrated   
understanding/competency today   
or at previous visit  
Diabetes distress and support:   
Demonstrated   
understanding/competency today   
or at previous visit  
  
PATIENT SELECTED THE FOLLOWING   
GOALS:  
-Healthy eating goal: follow a   
meal plan 4: I'm ready to start   
now  
-Monitoring goal: start   
checking blood sugar 4: I'm   
ready to start now  
  
POSSIBLE FUTURE TOPICS:  
  
1. The following topics were   
not assessed due to time   
limitations, but should be   
assessed at the next visit: all   
areas were assessed today or   
within the last 12 months.  
  
2. The following topics should   
be taught or reinforced at the   
next visit: N/A.  
  
DIABETES EDUCATION PLAN:  
Education completed and annual   
diabetes education follow-up   
visit recommended  
  
Time Spent (Minutes): 30  
  
This visit note will be   
communicated to the healthcare   
provider via access to shared   
medical record.  
  
SIGNATURE: Sarah De La Cruz RN   
PATIENT NAME: Karina Lindsay  
DATE: 2024 MRN:   
[id number]  
TIME: 3:32 PM PAGER:  
  
  
Electronically signed by Sarah De La Cruz RN at 2024 4:01 PM   
EDT  
documented in this encounter            [facility]  
   
                                                    2024 Telephone   
encounter Note                          Formatting of this note might   
be different from the original.  
1st attempt  
  
Sent mc to call office.  
  
Tomy Butler PAC  
  
Electronically signed by Renetta Butler at 2024 7:46   
AM EDT  
                                        [facility]  
   
                                                    2024 Telephone   
encounter Note                          Formatting of this note might   
be different from the original.  
----- Message from Kolby Montesinos RD sent at 2024 7:31   
AM EDT -----  
Regarding: FW: Gestational DM  
Please call pt and schedule   
with me or Andree Morales RD for   
virtual GDM education appt.  
  
I have openings for GDM  at   
10am or 11am  
Andree has an opening 6/10 at   
8am  
  
Thanks,  
Kolby Montesinos RD  
  
----- Message -----  
From: Rachel Herrera  
Sent: 2024 2:48 PM EDT  
To: [facility]  
Subject: FW: Gestational DM  
  
----- Message -----  
From: Perla Thomas  
Sent: 2024 2:28 PM EDT  
To: Jeanna Stanley RN; Cass Galo LPN; #  
Subject: RE: Gestational DM  
  
We have a Diabetes Educator who   
is currently out of office. We   
do not have an Endo Registered   
Dietician in [place]. They have   
General Nutritionists. I'm not   
sure what you're looking for   
exactly but Jeanna is our   
Diabetes Educator who again is   
currently out on leave. This   
would need routed to [facility].  
  
Thank you in advance.  
----- Message -----  
From: Cass Galo LPN  
Sent: 2024 1:36 PM EDT  
To: Perla Thomas  
Subject: FW: Gestational DM  
  
----- Message -----  
From: Rachel Herrera  
Sent: 2024 12:57 PM EDT  
To: [facility]  
Subject: FW: Gestational DM  
  
Our RD at [facility] is requesting   
this patient be seen by an Endo   
RD due to GDM. I didn't want to   
incorrectly place them on your   
schedule. Can you please assist   
patient with a VV for GDM? It   
will take the place of the   
appointment tomorrow 2024   
with ANUJA Tony  
----- Message -----  
From: Keely Tony RD  
Sent: 2024 12:37 PM EDT  
To: Dilcia Neil   
Subject: Gestational DM  
  
Can you please reschedule this   
patient with Endo RD due to   
gestational DM? Looks like the   
[place] endo RD has an opening   
tomorrow morning. TY  
  
  
Electronically signed by Renetta Butler at 2024 7:46   
AM EDT  
                                        [facility]  
   
                                                    2024 Miscellaneous   
Notes                                   Formatting of this note might   
be different from the original.  
1st attempt  
  
Sent mc to call office.  
  
Tomy Butler PAC  
  
Electronically signed by Renetta Butler at 2024 7:46   
AM EDT  
Formatting of this note might   
be different from the original.  
----- Message from Kolby Montesinos RD sent at 2024 7:31   
AM EDT -----  
Regarding: FW: Gestational DM  
Please call pt and schedule   
with me or Andree Morales RD for   
virtual GDM education appt.  
  
I have openings for GDM  at   
10am or 11am  
Andree has an opening 6/10 at   
8am  
  
ThanksKolby RD  
  
----- Message -----  
From: Rachel Herrera  
Sent: 2024 2:48 PM EDT  
To: [facility]  
Subject: FW: Gestational DM  
  
----- Message -----  
From: Perla Thomas  
Sent: 2024 2:28 PM EDT  
To: Jeanna Stanley RN; Cass Galo LPN; #  
Subject: RE: Gestational DM  
  
We have a Diabetes Educator who   
is currently out of office. We   
do not have an Endo Registered   
Dietician in [place]. They have   
General Nutritionists. I'm not   
sure what you're looking for   
exactly but Jeanna is our   
Diabetes Educator who again is   
currently out on leave. This   
would need routed to [facility].  
  
Thank you in advance.  
----- Message -----  
From: Cass Galo LPN  
Sent: 2024 1:36 PM EDT  
To: Perla Thomas  
Subject: FW: Gestational DM  
  
----- Message -----  
From: Rachel Herrera  
Sent: 2024 12:57 PM EDT  
To: [facility]  
Subject: FW: Gestational DM  
  
Our RD at [facility] is requesting   
this patient be seen by an Endo   
RD due to GDM. I didn't want to   
incorrectly place them on your   
schedule. Can you please assist   
patient with a VV for GDM? It   
will take the place of the   
appointment tomorrow 2024   
with ANUJA Tony  
----- Message -----  
From: Keely Tony RD  
Sent: 2024 12:37 PM EDT  
To: Dilcia Neil   
Subject: Gestational DM  
  
Can you please reschedule this   
patient with Endo RD due to   
gestational DM? Looks like the   
[place] endo RD has an opening   
tomorrow morning. TY  
  
  
Electronically signed by Renetta Butler at 2024 7:46   
AM EDT  
documented in this encounter            [facility]  
   
                                        2024 Note     HNO ID: [id number]  
Author: KEELY TONY RD  
Service: ?  
Author Type: Registered   
Dietitian  
Type: Progress Notes  
Filed: 2024 11:59  
Note Text:  
[facility]  
Nutrition Therapy: Virtual   
Consult - Initial Assessment  
I have communicated my name and   
active licensure. The patient?s   
identity and  
physical location were verified   
at the time of this visit.   
Either the patient or  
their legal representative has   
been informed of the risks and   
benefits of -- and  
alternatives to -- treatment   
through a remote evaluation and   
consents to proceed  
with the evaluation remotely.  
Nutrition Diagnosis: Excessive   
carbohydrate intake, related   
to: DM, as evidenced  
by elevated blood sugar and   
Inconsistent carbohydrate   
intake, related to,  
lifestyle factors that   
interfere with the ability to   
regulate timing of  
carbohydrate consumption, as   
evidenced by diet recall .  
RECOMMENDED MALNUTRITION   
DIAGNOSIS: NO MALNUTRITION   
IDENTIFIED  
NUTRITION CARE PLAN  
Nutrition Intervention   
2024: Modify type and   
quantity of foods with meals  
and snacks  
Recommend following a   
carbohydrate controlled,   
protein rich, moderate fat   
eating  
pattern.  
-Consider using silvia to track   
your macros:  
-Carbohydrate-at least 175   
grams  
-Protein-100-120 grams  
-Fat-60-65 grams  
2. Recommended carb breakdown   
with meals/snacks  
-Breakfast-30g carb  
-Snack-15-30g carb  
-Suzos-19-32w carb  
-Snack-15-30g carb  
-Cnhrtu-61-64g carb  
3. Include protein source with   
all meals and snacks  
4. Choose whole grain starches   
with at least 2.5g fiber per   
serving  
5. Aim for at least 64 ounces   
of Zero calorie fluids per day  
-Limit fruit juice to 4 ounces   
per day  
-Replace ginger ale with   
seltzer water  
Exercise Goal: Recommend 10   
minute walk after meals.  
Nutrition Monitoring AND   
Evaluation: consistent carb   
intake with meals, blood  
sugar levels  
Need for Follow up: PRN  
Patient presents with   
Gestational DM.  
PMH:depressive disorder.  
Recent hx:anemia and heart burn   
during pregnancy.  
She reports up till 4 years ago   
followed a keto diet, broke her   
leg and resumed  
regular diet.  
Works 6p-6a as an NP.  
She reports since pregnant she   
has tended to eat more bread.  
Diet recall reveals excessive   
and inconsistent carbohydrate   
which is somewhat  
related to her work schedule;   
inadequate intake of protein.  
Fluid intake includes simple   
sugar from OJ and ginger ale.  
She is lacking formal exercise.  
Patient's symptoms are: None  
Diet History:  
Breakfast - 7:30am-(off   
day)Greek yogurt w/ granola or   
eggs + toast or cereal,  
drinks coffee and 1 cup OJ  
4:30-5a-at work same foods or   
high protein oatmeal  
9a-3:30pm-sleeping  
Snack - off day  
Lunch - leftovers +/- fruit AND   
veg  
Snack - none  
Dinner -   
4-6pm-chicken/pork/beef/fish +   
starch + veg  
-vegetarian meal-grain + veg   
+/- cheese  
Snack - none or sip on ginger   
ale  
Beverages - 1 cup coffee w/   
stevia, collagen, +/- nut pods;   
water, OJ,  
electrolyte drink, regular   
ginger ale  
Alcohol- none  
Vitamins/Supplements - see med   
list  
Activity:  
Activities of Daily Living:   
Active 50% of the day. (On feet   
for most of the  
day, i.e. teacher/salesman)  
-CNP  
-Farm chores  
Additional Activity: Sedentary   
(Little or no exercise:   
<1x/week)  
Anthropometrics:  
Height: Last 1 Encounter Ht   
Readings:  
Date: Ht:  
2024 167.6 cm (5' 6 )  
Weight: Last 1 Encounter Wt   
Readings:  
Date: Wt:  
2024 108 kg (238 lb)  
Body mass index is 38.41 kg/m?.  
Resting Metabolic Rate: 1797  
Malnutrition Screening  
Significant unintentional   
weight loss? No  
Eating less than 75% of usual   
intake for more than 2 weeks?   
No  
Potential Signs of   
Inflammation: no identifiable   
sources  
Education Materials Provided:   
Gestational Diabetes Program   
and Portion Plate  
Placemat  
READINESS TO LEARN  
Cognitive ability: Alert and   
oriented  
Motivation to learn: Interested  
Family support: Unable to   
assess - Family not present  
Instruction provided to:   
Patient  
Patient learns best by: Written   
Instruction - Hand-outs  
Verbal Instruction  
Factors affecting learning:   
None  
Physical limitations affecting   
learning: None  
Referred by: Yeimi TREADWELL Billing Type: Initial   
Assess/15 min 2 units  
SIGNATURE: Keely Tony RD PATIENT NAME: Karina May  
DATE: 2024 MRN: [id number]  
TIME: 7:29 AM PAGER: [phone]              [facility]  
   
                                                    2024 History of Presen  
t   
illness Narrative                       Formatting of this note is   
different from the original.  
[facility]  
Nutrition Therapy: Virtual   
Consult - Initial Assessment  
  
I have communicated my name and   
active licensure. The patient s   
identity and physical location   
were verified at the time of   
this visit. Either the patient   
or their legal representative   
has been informed of the risks   
and benefits of -- and   
alternatives to -- treatment   
through a remote evaluation and   
consents to proceed with the   
evaluation remotely.  
  
Nutrition Diagnosis: Excessive   
carbohydrate intake, related   
to: DM, as evidenced by   
elevated blood sugar and   
Inconsistent carbohydrate   
intake, related to, lifestyle   
factors that interfere with the   
ability to regulate timing of   
carbohydrate consumption, as   
evidenced by diet recall .  
  
RECOMMENDED MALNUTRITION   
DIAGNOSIS: NO MALNUTRITION   
IDENTIFIED  
  
NUTRITION CARE PLAN  
  
Nutrition Intervention   
2024: Modify type and   
quantity of foods with meals   
and snacks  
  
Recommend following a   
carbohydrate controlled,   
protein rich, moderate fat   
eating pattern.  
-Consider using silvia to track   
your macros:  
-Carbohydrate-at least 175   
grams  
-Protein-100-120 grams  
-Fat-60-65 grams  
  
2. Recommended carb breakdown   
with meals/snacks  
-Breakfast-30g carb  
-Snack-15-30g carb  
-Wtmvu-21-01n carb  
-Snack-15-30g carb  
-Dsnngc-86-56p carb  
  
3. Include protein source with   
all meals and snacks  
  
4. Choose whole grain starches   
with at least 2.5g fiber per   
serving  
  
5. Aim for at least 64 ounces   
of Zero calorie fluids per day  
-Limit fruit juice to 4 ounces   
per day  
-Replace ginger ale with   
seltzer water  
  
Exercise Goal: Recommend 10   
minute walk after meals.  
  
Nutrition Monitoring &   
Evaluation: consistent carb   
intake with meals, blood sugar   
levels  
  
Need for Follow up: PRN  
  
Patient presents with   
Gestational DM.  
PMH:depressive disorder.  
Recent hx:anemia and heart burn   
during pregnancy.  
She reports up till 4 years ago   
followed a keto diet, broke her   
leg and resumed regular diet.  
Works 6p-6a as an NP.  
She reports since pregnant she   
has tended to eat more bread.  
Diet recall reveals excessive   
and inconsistent carbohydrate   
which is somewhat related to   
her work schedule; inadequate   
intake of protein.  
Fluid intake includes simple   
sugar from OJ and ginger ale.  
She is lacking formal exercise.  
  
Patient's symptoms are: None  
  
Diet History:  
Breakfast - 7:30am-(off   
day)Greek yogurt w/ granola or   
eggs + toast or cereal, drinks   
coffee and 1 cup OJ  
4:30-5a-at work same foods or   
high protein oatmeal  
9a-3:30pm-sleeping  
Snack - off day  
Lunch - leftovers +/- fruit &   
veg  
Snack - none  
Dinner -   
4-6pm-chicken/pork/beef/fish +   
starch + veg  
-vegetarian meal-grain + veg   
+/- cheese  
Snack - none or sip on ginger   
ale  
Beverages - 1 cup coffee w/   
stevia, collagen, +/- nut pods;   
water, OJ, electrolyte drink,   
regular ginger ale  
Alcohol- none  
Vitamins/Supplements - see med   
list  
  
Activity:  
Activities of Daily Living:   
Active 50% of the day. (On feet   
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   
teacher/salesman)  
-CNP  
-Farm chores  
Additional Activity: Sedentary   
(Little or no exercise:   
<1x/week)  
  
Anthropometrics:  
Height: Last 1 Encounter Ht   
Readings:  
Date: Ht:  
2024 167.6 cm (5' 6 )  
Weight: Last 1 Encounter Wt   
Readings:  
Date: Wt:  
2024 108 kg (238 lb)  
Body mass index is 38.41 kg/m .  
Resting Metabolic Rate: 1797  
  
Malnutrition Screening  
Significant unintentional   
weight loss? No  
Eating less than 75% of usual   
intake for more than 2 weeks?   
No  
Potential Signs of   
Inflammation: no identifiable   
sources  
  
  
Education Materials Provided:   
Gestational Diabetes Program   
and Portion Plate Placemat  
  
READINESS TO LEARN  
Cognitive ability: Alert and   
oriented  
Motivation to learn: Interested  
Family support: Unable to   
assess - Family not present  
Instruction provided to:   
Patient  
Patient learns best by: Written   
Instruction - Hand-outs  
Verbal Instruction  
Factors affecting learning:   
None  
Physical limitations affecting   
learning: None  
  
Referred by: Yeimi TREADWELL Billing Type: Initial   
Assess/15 min 2 units  
  
SIGNATURE: Keely Tony RD PATIENT NAME: Karina May  
DATE: 2024 MRN: [id number]  
TIME: 7:29 AM PAGER: [phone]  
  
  
Electronically signed by   
Keely Tony RD at   
2024 11:59 AM EDT  
documented in this encounter            [facility]  
   
                                                    2024 Telephone   
encounter Note                          Formatting of this note might   
be different from the original.  
Patient notified and voiced   
understanding of results.   
Nutrition and diabetic   
education appointments   
scheduled. Growth ultrasound at   
32 weeks scheduled.  
  
Maisha Jarrett RN  
  
Electronically signed by   
Maisha Jarrett RN at   
2024 9:07 AM EDT  
                                        [facility]  
   
                                                    2024 Miscellaneous   
Notes                                   Formatting of this note might   
be different from the original.  
Patient notified and voiced   
understanding of results.   
Nutrition and diabetic   
education appointments   
scheduled. Growth ultrasound at   
32 weeks scheduled.  
  
Maisha Jarrett RN  
  
Electronically signed by   
Maisha Jarrett RN at   
2024 9:07 AM EDT  
Formatting of this note might   
be different from the original.  
Please notify patient:  
  
Abnormal 3 hour, consistent   
with GDM.  
Supplies ordered.  
Nutrition consult and diabetes   
consult placed.  
Growth ultrasounds every 4   
weeks at diagnosis ordered.   
Please assist in scheduling.  
Patient to bring glucose logs   
to next appointment. Please   
review timing of sugars and   
parameters.  
FREDY Beebe.SANDHYA  
  
  
Electronically signed by Bobbi Jalloh APRN.CNP at 2024   
7:12 AM EDT  
documented in this encounter            [facility]  
   
                                                    2024 Telephone   
encounter Note                          Formatting of this note might   
be different from the original.  
Please notify patient:  
  
Abnormal 3 hour, consistent   
with GDM.  
Supplies ordered.  
Nutrition consult and diabetes   
consult placed.  
Growth ultrasounds every 4   
weeks at diagnosis ordered.   
Please assist in scheduling.  
Patient to bring glucose logs   
to next appointment. Please   
review timing of sugars and   
parameters.  
FREDY Beebe.SANDHYA  
  
  
Electronically signed by Bobbi Jalloh APRN.CNP at 2024   
7:12 AM EDT  
                                        [facility]  
   
                                        2024 Note     HNO ID: [id number]  
Author: NATASHA BURLESON RN  
Service: ?  
Author Type: Registered Nurse  
Type: Progress Notes  
Filed: 2024 12:34  
Note Text:  
Karina Lindsay 34 year old is here   
for her injection of Rhophylac.  
Karina Lindsay  
Antibody Screen (no units)  
Date Value  
2024 Negative  
Karina Lindsay is RH Negative  
Rhophylac was given without   
incident.  
See immunizations for details   
of immunizations administered   
today.  
Provider FREDY Hinds.CNM was present in office   
at time of injection  
Karina Lindsay was given her   
Rhophylac pocket card.  
Natasha Burleson RN                      [facility]  
   
                                                    2024 History of Presen  
t   
illness Narrative                       Formatting of this note is   
different from the original.  
Karina Lindsay 34 year old is here   
for her injection of Rhophylac.  
  
Karina Lindsay  
Antibody Screen (no units)  
Date Value  
2024 Negative  
  
Karina Lindsay is RH Negative  
  
Rhophylac was given without   
incident.  
  
See immunizations for details   
of immunizations administered   
today.  
  
Provider FREDY Hinds.CNM was present in office   
at time of injection  
  
Karina Lidnsay was given her   
Rhophylac pocket card.  
  
Natasha Burleson RN  
  
Electronically signed by   
Natasha Burleson RN at   
2024 12:34 PM EDT  
Formatting of this note might   
be different from the original.  
Patient identified by name and   
date of birth.  
  
Karina Lindsay presents today for a   
vaccination of Tdap.  
  
Patient denies an allergy to   
latex: yes  
Patient denies a severe   
(life-threatening) allergy to a   
previous dose of Tdap, DTP,   
DTaP, DT or Td vaccine. Yes  
  
Patient denies history of   
epilepsy or neurological   
problems: Yes  
  
Patient is afebrile and denies   
being moderately or severely   
ill: Yes  
  
Patient denies history of   
Guillain-T[place] Syndrome (a   
severe paralytic illness): Yes  
  
Tdap Adacel injection was given   
without incident.  
  
See immunizations for details   
of immunizations administered   
today.  
  
VIS sheet provided: Yes  
  
Provider FREDY Hinds CNM was present in office at   
time of injection.  
Tobin Florez MA  
  
Electronically signed by Tobin Florez MA at 2024 12:34   
PM EDT  
documented in this encounter            [facility]  
   
                                        2024 Progress note Formatting of t  
his note might   
be different from the original.  
ANTHONY-S: Karina is a 34 year old   
female who presents at 17w2d   
for a routine visit. Feeling   
fetal movement. Denies   
headache, visual changes, chest   
pain, shortness of breath,   
vaginal bleeding, leakage of   
fluid, or dysuria. Feeling   
well, no complaints. Follow up   
appointment with psych NP Hope   
419, will start counseling Valentina   
3, no medication adjustments   
and feeling well at this time.  
  
O: See flow sheet  
Gen: No apparent distress  
Abd: Gravid, nontender  
S=D, 18lb TWG, watch fundal   
height  
  
ASSESSMENT/PLAN:  
1. Encounter for supervision of   
normal first pregnancy in third   
trimester  
- LARC form reviewed and   
signed. Patient declines  
- Opioid screen negative  
- Birth plan form discussed and   
given to patient. Has CBE and   
lactation classes planned  
  
2. 28 weeks gestation of   
pregnancy  
  
3. Obesity affecting pregnancy   
in second trimester,   
unspecified obesity type  
- Pre pregnancy BMI 35  
- OBSTETRIC ULTRASOUND WHI at   
32 weeks then every 4 weeks  
- FETAL NON-STRESS TEST weekly   
at 36 weeks  
- Continue ASA 81mg PO once   
daily  
  
4. Need for vaccination - ICD9:   
V05.9, ICD10: Z23  
Tdap today  
  
5. Heartburn during pregnancy   
in second trimester - ICD9:   
646.83, 787.1, ICD10: O26.892,   
R12  
-Prilosec 20 mg BID PO  
  
6. Rh negative state in   
antepartum period - ICD9:   
646.83, ICD10: O26.899, Z67.91  
-Rhogam today  
  
7. Depressive disorder - ICD9:   
311, ICD10: F32.A  
- Seeing psych NP and counselor  
- Continue Zoloft dose to 100   
mg, feels she is doing well  
  
PTL precautions reviewed, RTO   
in 2 weeks  
  
Letty Beaver APRN.CNM  
  
Electronically signed by Letty Beaver APRN.CNM at 2024   
12:34 PM EDT  
                                        [facility]  
   
                                                    2024 Miscellaneous   
Notes                                   Formatting of this note might   
be different from the original.  
ANTHONY-S: Karina is a 34 year old   
female who presents at 17w2d   
for a routine visit. Feeling   
fetal movement. Denies   
headache, visual changes, chest   
pain, shortness of breath,   
vaginal bleeding, leakage of   
fluid, or dysuria. Feeling   
well, no complaints. Follow up   
appointment with psych NP Hope   
419, will start counseling Valentina   
3, no medication adjustments   
and feeling well at this time.  
  
O: See flow sheet  
Gen: No apparent distress  
Abd: Gravid, nontender  
S=D, 18lb TWG, watch fundal   
height  
  
ASSESSMENT/PLAN:  
1. Encounter for supervision of   
normal first pregnancy in third   
trimester  
- LARC form reviewed and   
signed. Patient declines  
- Opioid screen negative  
- Birth plan form discussed and   
given to patient. Has CBE and   
lactation classes planned  
  
2. 28 weeks gestation of   
pregnancy  
  
3. Obesity affecting pregnancy   
in second trimester,   
unspecified obesity type  
- Pre pregnancy BMI 35  
- OBSTETRIC ULTRASOUND WHI at   
32 weeks then every 4 weeks  
- FETAL NON-STRESS TEST weekly   
at 36 weeks  
- Continue ASA 81mg PO once   
daily  
  
4. Need for vaccination - ICD9:   
V05.9, ICD10: Z23  
Tdap today  
  
5. Heartburn during pregnancy   
in second trimester - ICD9:   
646.83, 787.1, ICD10: O26.892,   
R12  
-Prilosec 20 mg BID PO  
  
6. Rh negative state in   
antepartum period - ICD9:   
646.83, ICD10: O26.899, Z67.91  
-Rhogam today  
  
7. Depressive disorder - ICD9:   
311, ICD10: F32.A  
- Seeing psych NP and counselor  
- Continue Zoloft dose to 100   
mg, feels she is doing well  
  
PTL precautions reviewed, RTO   
in 2 weeks  
  
Letty Beaver APRN.CNM  
  
Electronically signed by Letty Beaver APRN.CNM at 2024   
12:34 PM EDT  
documented in this encounter            [facility]  
   
                                        2024 Note     HNO ID: [id number]  
Author: TOBIN FLOREZ MA  
Service: ?  
Author Type: Technician  
Type: Progress Notes  
Filed: 2024 12:34  
Note Text:  
Patient identified by name and   
date of birth.  
Karina Lindsay presents today for a   
vaccination of Tdap.  
Patient denies an allergy to   
latex: yes  
Patient denies a severe   
(life-threatening) allergy to a   
previous dose of Tdap,  
DTP, DTaP, DT or Td vaccine.   
Yes  
Patient denies history of   
epilepsy or neurological   
problems: Yes  
Patient is afebrile and denies   
being moderately or severely   
ill: Yes  
Patient denies history of   
Guillain-Tecate Syndrome (a   
severe paralytic illness):  
Yes  
Tdap Adacel injection was given   
without incident.  
See immunizations for details   
of immunizations administered   
today.  
VIS sheet provided: Yes  
Provider FREDY Hinds CNM was present in office at   
time of injection.  
Tobin Florez MA                        [facility]  
   
                                        2024 Instructions   
  
  
Tobin Florez MA - 2024   
11:08 AM EDTFormatting of this   
note might be different from   
the original.  
SEQUENTIAL SCREENINGS  
The [facility] offers   
sequential screenings for women   
who are interested in   
screenings for chromosomal   
abnormalities and certain birth   
defects during a pregnancy. The   
sequential screen combines   
ultrasound and blood tests to   
determine the risk of   
chromosomal abnormalities,   
including Down's Syndrome   
(Trisomy 21) and Trisomy 18, as   
well as open neural tube   
defects including spina bifida.   
Ultrasound examination is   
performed between 11 weeks and   
13 weeks gestational age. Blood   
tests are drawn after the   
ultrasound and again later in   
the pregnancy between 15 and 21   
weeks gestational age. Please   
let your physician know if you   
are interested in this testing.   
It will require an appointment   
with our ultrasound technician.   
This is not an ultrasound   
performed by a physician in our   
office during a routine visit.  
  
  
SIGNS AND SYMPTOMS OF    
LABOR  
1. Contractions every 10   
minutes or more often  
2. Clear, pink, or brownish   
fluid (water) leaking from   
vagina  
3. Feeling that baby is pushing   
down, pressure  
4. Low, dull backache  
5. Cramps that feel like a   
period  
6. Cramps with or without   
diarrhea  
  
If you notice any of the above   
symptoms, contact our office at   
[phone] and ask to speak   
with a nurse.  
  
After hours, you can call   
Weekdone registry at   
[phone] OR call \[facility]\"" at [phone] and   
ask to have the doctor on call   
paged.  
  
If you consider this an   
emergency, dial  or go to   
your nearest emergency   
department.  
  
NEED HELP? Are you dealing with   
a violent or abusive   
relationship? Are you a victim   
of rape or sexual assult? Call   
[facility] ([place])   
24 hour Crisis Hotline:   
272.802.2291 or 179-222-4251.  
  
PREGNANCY MANUAL  
Your Guide to a Healthy   
Pregnancy manual is now   
on-line. Visit   
[email or web address]  
[place]ron to download your   
free copy  
  
Electronically signed by Tobin Florez MA at 2024 11:08   
AM EDT  
  
documented in this encounter            [facility]  
   
                                        2024 Progress note Formatting of t  
his note might   
be different from the original.  
Karina Lindsay is a 34 year old   
female who presents at 25w2d   
for a routine visit. Good fetal   
movement. Denies headache,   
visual changes, chest pain,   
shortness of breath, vaginal   
bleeding, leakage of fluid, or   
dysuria. Feeling well, no   
complaints. Size equal to   
dates.  
ASSESSMENT/PLAN:  
1. 25 weeks gestation of   
pregnancy - ICD9: V22.2, ICD10:   
Z3A.25 (primary diagnosis)  
2. Encounter for supervision of   
normal first pregnancy in   
second trimester - ICD9: V22.0,   
ICD10: Z34.02  
3. Obesity affecting pregnancy   
in second trimester,   
unspecified obesity type -   
ICD9: 649.13, ICD10: O99.212  
4. Depressive disorder - ICD9:   
311, ICD10: F32.A  
5. Depression affecting   
pregnancy in second trimester,   
antepartum - ICD9: 648.43, 311,   
ICD10: O99.342, F32.A  
  
- Continue Zoloft PO daily  
- Continue Abilify PO daily  
- Continue Prilosec PO daily  
- Continue ASA  
- PTL/ precautions reviewed  
- RTO 3 weeks for PENG with GCT/   
CBC/RPR- will need RHOGAM  
Janet Lilly APRN.CNM  
  
Electronically signed by   
Janet Lilly APRN.CNM at   
2024 9:22 AM EDT  
                                        [facility]  
   
                                                    2024 Miscellaneous   
Notes                                   Formatting of this note might   
be different from the original.  
Karina Lindsay is a 34 year old   
female who presents at 25w2d   
for a routine visit. Good fetal   
movement. Denies headache,   
visual changes, chest pain,   
shortness of breath, vaginal   
bleeding, leakage of fluid, or   
dysuria. Feeling well, no   
complaints. Size equal to   
dates.  
ASSESSMENT/PLAN:  
1. 25 weeks gestation of   
pregnancy - ICD9: V22.2, ICD10:   
Z3A.25 (primary diagnosis)  
2. Encounter for supervision of   
normal first pregnancy in   
second trimester - ICD9: V22.0,   
ICD10: Z34.02  
3. Obesity affecting pregnancy   
in second trimester,   
unspecified obesity type -   
ICD9: 649.13, ICD10: O99.212  
4. Depressive disorder - ICD9:   
311, ICD10: F32.A  
5. Depression affecting   
pregnancy in second trimester,   
antepartum - ICD9: 648.43, 311,   
ICD10: O99.342, F32.A  
  
- Continue Zoloft PO daily  
- Continue Abilify PO daily  
- Continue Prilosec PO daily  
- Continue ASA  
- PTL/ precautions reviewed  
- RTO 3 weeks for PENG with GCT/   
CBC/RPR- will need MICHELLE Lilly APRN.CNM  
  
Electronically signed by   
Janet Lilly APRN.CNM at   
2024 9:22 AM EDT  
documented in this encounter            [facility]  
   
                                        2024 Instructions   
  
  
Kelly Cervantes MA - 2024   
8:04 AM EDTFormatting of this   
note might be different from   
the original.  
SEQUENTIAL SCREENINGS  
The [facility] offers   
sequential screenings for women   
who are interested in   
screenings for chromosomal   
abnormalities and certain birth   
defects during a pregnancy. The   
sequential screen combines   
ultrasound and blood tests to   
determine the risk of   
chromosomal abnormalities,   
including Down's Syndrome   
(Trisomy 21) and Trisomy 18, as   
well as open neural tube   
defects including spina bifida.   
Ultrasound examination is   
performed between 11 weeks and   
13 weeks gestational age. Blood   
tests are drawn after the   
ultrasound and again later in   
the pregnancy between 15 and 21   
weeks gestational age. Please   
let your physician know if you   
are interested in this testing.   
It will require an appointment   
with our ultrasound technician.   
This is not an ultrasound   
performed by a physician in our   
office during a routine visit.  
  
  
SIGNS AND SYMPTOMS OF    
LABOR  
1. Contractions every 10   
minutes or more often  
2. Clear, pink, or brownish   
fluid (water) leaking from   
vagina  
3. Feeling that baby is pushing   
down, pressure  
4. Low, dull backache  
5. Cramps that feel like a   
period  
6. Cramps with or without   
diarrhea  
  
If you notice any of the above   
symptoms, contact our office at   
[phone] and ask to speak   
with a nurse.  
  
After hours, you can call   
doctors registry at   
[phone] OR call \[facility]\"" at [phone] and   
ask to have the doctor on call   
paged.  
  
If you consider this an   
emergency, dial 9--1 or go to   
your nearest emergency   
department.  
  
NEED HELP? Are you dealing with   
a violent or abusive   
relationship? Are you a victim   
of rape or sexual assult? Call   
[facility] ([place])   
24 hour Crisis Hotline:   
597.790.3037 or 655-442-7641.  
  
PREGNANCY MANUAL  
Your Guide to a Healthy   
Pregnancy manual is now   
on-line. Visit   
[email or web address]  
[place] to download your   
free copy  
  
Electronically signed by   
Kelly Cervantes MA at   
2024 8:04 AM EDT  
  
documented in this encounter            [facility]  
   
                                                    2024 Miscellaneous   
Notes                                   Formatting of this note might   
be different from the original.  
ANTHONY-S: Karina Lindsay is a 34 year   
old female who presents at   
21w2d with LAURYN:2024,   
Alternate LAURYN Entry for a   
routine visit. Denies headache,   
visual changes, chest pain,   
shortness of breath, vaginal   
bleeding, leakage of fluid, or   
dysuria. Feeling well, no   
complaints.  
  
O: See flow sheet  
Gen: No apparent distress  
Abd: Gravid, nontender  
  
ASSESSMENT/PLAN:  
1. Encounter for supervision of   
normal first pregnancy in   
second trimester  
  
2. 21 weeks gestation of   
pregnancy  
  
3. Obesity affecting pregnancy   
in second trimester,   
unspecified obesity type  
-Prepregnancy BMI 35  
-Growth US every 4wk starting   
at 32 weeks and NSTs at 36 wk  
-Continue ASA  
  
4. Depressive disorder  
-Zoloft increased to 100mg PO   
daily  
-Psych provider added Abilify  
  
5. Heartburn during pregnancy   
in second trimester  
-Continue Prilosec 20mg PO BID  
  
P:  
1) PTL precautions reviewed and   
when to call  
2) RTO in 4 weeks  
3) Anatomy US completed,   
awaiting formal results  
4) Repeat type screen because   
patient states she was A+, O   
negative on initial type and   
Screen, repeat today.  
5) Declined aneuploidy   
screening  
6) Reviewed CBE and   
breastfeeding  
  
Letty Beaver APRN.CNM  
Electronically signed by Letty Beaver APRN.CNM at 2024   
9:25 AM EDT  
documented in this encounter            [facility]  
   
                                        2024 Instructions   
  
  
Tobin Florez MA - 2024   
9:06 AM EDTFormatting of this   
note might be different from   
the original.  
SEQUENTIAL SCREENINGS  
The [facility] offers   
sequential screenings for women   
who are interested in   
screenings for chromosomal   
abnormalities and certain birth   
defects during a pregnancy. The   
sequential screen combines   
ultrasound and blood tests to   
determine the risk of   
chromosomal abnormalities,   
including Down's Syndrome   
(Trisomy 21) and Trisomy 18, as   
well as open neural tube   
defects including spina bifida.   
Ultrasound examination is   
performed between 11 weeks and   
13 weeks gestational age. Blood   
tests are drawn after the   
ultrasound and again later in   
the pregnancy between 15 and 21   
weeks gestational age. Please   
let your physician know if you   
are interested in this testing.   
It will require an appointment   
with our ultrasound technician.   
This is not an ultrasound   
performed by a physician in our   
office during a routine visit.  
  
  
SIGNS AND SYMPTOMS OF    
LABOR  
1. Contractions every 10   
minutes or more often  
2. Clear, pink, or brownish   
fluid (water) leaking from   
vagina  
3. Feeling that baby is pushing   
down, pressure  
4. Low, dull backache  
5. Cramps that feel like a   
period  
6. Cramps with or without   
diarrhea  
  
If you notice any of the above   
symptoms, contact our office at   
[phone] and ask to speak   
with a nurse.  
  
After hours, you can call   
doctors registry at   
[phone] OR call \[facility]\"" at [phone] and   
ask to have the doctor on call   
paged.  
  
If you consider this an   
emergency, dial [phone] or go to   
your nearest emergency   
department.  
  
NEED HELP? Are you dealing with   
a violent or abusive   
relationship? Are you a victim   
of rape or sexual assult? Call   
[facility] ([facility]   
24 hour Crisis Hotline:   
182.510.9690 or 990-146-1185.  
  
PREGNANCY MANUAL  
Your Guide to a Healthy   
Pregnancy manual is now   
on-line. Visit   
[email or web address]  
[email or web address] to download your   
free copy  
  
Electronically signed by Tobin Florez MA at 2024 9:06   
AM EDT  
  
documented in this encounter            [facility]  
   
                                        2024 Instructions   
  
  
Kasey Osuna LPN -   
2024 9:18 AM   
EDTFormatting of this note   
might be different from the   
original.  
SEQUENTIAL SCREENINGS  
[facility] offers   
sequential screenings for women   
who are interested in   
screenings for chromosomal   
abnormalities and certain birth   
defects during a pregnancy. The   
sequential screen combines   
ultrasound and blood tests to   
determine the risk of   
chromosomal abnormalities,   
including Down's Syndrome   
(Trisomy 21) and Trisomy 18, as   
well as open neural tube   
defects including spina bifida.   
Ultrasound examination is   
performed between 11 weeks and   
13 weeks gestational age. Blood   
tests are drawn after the   
ultrasound and again later in   
the pregnancy between 15 and 21   
weeks gestational age. Please   
let your physician know if you   
are interested in this testing.   
It will require an appointment   
with our ultrasound technician.   
This is not an ultrasound   
performed by a physician in our   
office during a routine visit.  
  
  
SIGNS AND SYMPTOMS OF    
LABOR  
1. Contractions every 10   
minutes or more often  
2. Clear, pink, or brownish   
fluid (water) leaking from   
vagina  
3. Feeling that baby is pushing   
down, pressure  
4. Low, dull backache  
5. Cramps that feel like a   
period  
6. Cramps with or without   
diarrhea  
  
If you notice any of the above   
symptoms, contact our office at   
[phone] and ask to speak   
with a nurse.  
  
After hours, you can call   
doctors registry at   
[phone] OR call \[facility]\"" at [phone] and   
ask to have the doctor on call   
paged.  
  
If you consider this an   
emergency, dial 9-1-1 or go to   
your nearest emergency   
department.  
  
NEED HELP? Are you dealing with   
a violent or abusive   
relationship? Are you a victim   
of rape or sexual assult? Call   
[facility] ([place])   
24 hour Crisis Hotline:   
866.829.6784 or 765-068-6021.  
  
PREGNANCY MANUAL  
Your Guide to a Healthy   
Pregnancy manual is now   
on-line. Visit   
[email or web address]  
[place] to download your   
free copy  
  
Electronically signed by   
Ksaey Osuna LPN at   
2024 9:18 AM EDT  
  
documented in this encounter            [facility]  
   
                                                    2024 Miscellaneous   
Notes                                   Formatting of this note might   
be different from the original.  
S: Karina is a 34 year old   
female who presents at 17w2d   
for a routine visit. Feeling   
fetal movement. Denies   
headache, visual changes, chest   
pain, shortness of breath,   
vaginal bleeding, leakage of   
fluid, or dysuria. Feeling   
well, no complaints.  
  
O: See flow sheet  
Gen: No apparent distress  
Abd: Gravid, nontender  
  
ASSESSMENT/PLAN:  
1. Encounter for supervision of   
normal first pregnancy in   
second trimester - ICD9: V22.0,   
ICD10: Z34.02 (primary   
diagnosis)  
- Repeat type and screen - A+   
per , O- per [facility]  
  
2. 17 weeks gestation of   
pregnancy - ICD9: V22.2, ICD10:   
Z3A.17  
- Anatomy ultrasound scheduled   
for   
- Prefers midwifery care  
  
3. Obesity affecting pregnancy   
in second trimester,   
unspecified obesity type -   
ICD9: 649.13, ICD10: O99.212  
- Pre pregnancy BMI 35  
- Plan for 32 week growth q 4   
weeks. Weekly NSTs at 36 weeks.  
- Continue LDA  
  
4. Depression affecting   
pregnancy in second trimester,   
antepartum - ICD9: 648.43, 311,   
ICD10: O99.342, F32.A  
- Seeing psych NP and counselor  
- Would like to increase Zoloft   
dose to 100 mg. Reviewed r/b/a  
- Denies thoughts of self harm   
or harming others  
  
5. Heartburn during pregnancy   
in second trimester - ICD9:   
646.83, 787.1, ICD10: O26.892,   
R12  
- Trial Prilosec 20 mg BID   
instead of 40 mg daily  
  
PTL precautions reviewed. RTO   
in 4 weeks or sooner as needed.  
  
FREDY Beebe.CNP  
  
Electronically signed by Bobbi Jalloh APRN.CNP at 2024   
10:23 AM EDT  
documented in this encounter            [facility]  
   
                                                    02- Miscellaneous   
Notes                                   Formatting of this note might   
be different from the original.  
Redraw with 28 wk labs  
Can address further at next   
visit thanks  
Electronically signed by   
Shayna Escalera MD at 02/15/2024   
3:17 PM EST  
Formatting of this note might   
be different from the original.  
13w4d  
No previous Type and screen on   
file.  
Electronically signed by   
Narda Blanco RN at   
2024 9:41 AM EST  
documented in this encounter            [facility]  
   
                                                    2024 Miscellaneous   
Notes                                   Formatting of this note might   
be different from the original.  
SW- Some hip and back   
discomfort. No vb, lof. Baby   
ASA. Mood stable on Zoloft. NT   
today and final report pending.   
Ordered anatomy US. Labs today.   
RTO 4 wks. Shayna Escalera DO  
Electronically signed by   
Shayna Escalera MD at 2024   
12:01 PM EST  
documented in this encounter            [facility]  
   
                                        2024 Instructions   
  
  
Lorena Conway Ma -   
2024 11:43 AM   
ESTFormatting of this note   
might be different from the   
original.  
SEQUENTIAL SCREENINGS  
The [facility] offers   
sequential screenings for women   
who are interested in   
screenings for chromosomal   
abnormalities and certain birth   
defects during a pregnancy. The   
sequential screen combines   
ultrasound and blood tests to   
determine the risk of   
chromosomal abnormalities,   
including Down's Syndrome   
(Trisomy 21) and Trisomy 18, as   
well as open neural tube   
defects including spina bifida.   
Ultrasound examination is   
performed between 11 weeks and   
13 weeks gestational age. Blood   
tests are drawn after the   
ultrasound and again later in   
the pregnancy between 15 and 21   
weeks gestational age. Please   
let your physician know if you   
are interested in this testing.   
It will require an appointment   
with our ultrasound technician.   
This is not an ultrasound   
performed by a physician in our   
office during a routine visit.  
  
  
SIGNS AND SYMPTOMS OF    
LABOR  
1. Contractions every 10   
minutes or more often  
2. Clear, pink, or brownish   
fluid (water) leaking from   
vagina  
3. Feeling that baby is pushing   
down, pressure  
4. Low, dull backache  
5. Cramps that feel like a   
period  
6. Cramps with or without   
diarrhea  
  
If you notice any of the above   
symptoms, contact our office at   
[phone] and ask to speak   
with a nurse.  
  
After hours, you can call   
doctors registry at   
[phone] OR call \[facility]\"" at [phone] and   
ask to have the doctor on call   
paged.  
  
If you consider this an   
emergency, dial -[phone] or go to   
your nearest emergency   
department.  
  
NEED HELP? Are you dealing with   
a violent or abusive   
relationship? Are you a victim   
of rape or sexual assult? Call   
[facility] ([facility]   
24 hour Crisis Hotline:   
639.411.3333 or 169-253-8897.  
  
PREGNANCY MANUAL  
Your Guide to a Healthy   
Pregnancy manual is now   
on-line. Visit   
[email or web address]  
[email or web address] to download your   
free copy  
  
Electronically signed by   
Lorena Conway Ma at   
2024 11:43 AM EST  
  
documented in this encounter            [facility]  
   
                                                    2024 History of Presen  
t   
illness Narrative                       Formatting of this note is   
different from the original.  
Subjective  
Patient ID: Karina Lindsay is a 34   
y.o. female who presents for   
Annual Exam ([facility] ).  
  
Karina comes to the office for   
medication refill.  
GERD - PPI daily; Discussed   
decreasing fat intake, weight   
loss, avoiding recumbency for 3   
hours postprandially, elevation   
of HOB and avoidance of   
chocolate, tomato based foods,   
alcohol, peppermint,   
caffeinated products, citrus   
fruits/drinks and onions. Tried   
stopping PPI once discovered   
she was pregnant but GERD sx   
returned. Continue PPI therapy   
(she indicates she ck'd w/ her   
OB as well & they are in   
agreement to continue)  
Depression - managed by [place], stable on zoloft  
Cervical CA Screening: PAP 1Y   
ago. UTD thru her OB GYN in   
[place]. 12 W pregnant  
Labs 1Y ago- scheduled for labs   
thru ob/gyn  
No surgeries or   
hospitalizations within the   
last year  
Fell on ice and then two weeks   
later tripped in basement  
  
  
  
  
Review of Systems  
Constitutional: Positive for   
fatigue. Negative for chills   
and fever.  
Gastrointestinal: Negative for   
abdominal pain.  
+ GERD  
  
Objective  
/76   Pulse 96   Ht 1.651   
m (5' 5 )   Wt 100 kg (221 lb)   
  SpO2 97%   BMI 36.78 kg/m  
  
Physical Exam  
Vitals and nursing note   
reviewed.  
Constitutional:  
Appearance: Normal appearance.  
HENT:  
Head: Normocephalic.  
Cardiovascular:  
Rate and Rhythm: Normal rate   
and regular rhythm.  
Heart sounds: Normal heart   
sounds.  
Pulmonary:  
Effort: Pulmonary effort is   
normal.  
Breath sounds: Normal breath   
sounds.  
Skin:  
General: Skin is warm and dry.  
Neurological:  
General: No focal deficit   
present.  
Mental Status: She is alert and   
oriented to person, place, and   
time.  
Psychiatric:  
Mood and Affect: Mood normal.  
Thought Content: Thought   
content normal.  
  
Assessment/Plan  
Problem List Items Addressed   
This Visit  
  
ICD-10-CM  
Depressive disorder - Primary   
F32.A  
Gastroesophageal reflux disease   
K21.9  
Relevant Medications  
omeprazole (PriLOSEC) 40 mg DR   
capsule  
Other Relevant Orders  
Follow Up In Primary Care -   
Established  
  
1. Depressive disorder  
stable on zoloft; follows w/   
[facility]  
  
2. Gastroesophageal reflux   
disease, unspecified whether   
esophagitis present omeprazole   
(PriLOSEC) 40 mg DR capsule  
Follow Up In Primary Care -   
Established  
cont. prilosec  
  
  
Patient was identified as a   
fall risk. Risk prevention   
instructions provided.  
Electronically signed by   
JYOTI Vazquez at   
2024 9:28 AM EST  
documented in this encounter            [facility]  
Work Phone:   
[phone]  
   
                                        2024 Instructions   
  
  
JYOTI Vazquez -   
2024 9:00 AM   
ESTFormatting of this note is   
different from the original.  
Refill prilosec  
Office visit 1 year  
  
Ways to Help Prevent Falls at   
Home  
  
Quick Tips  
? Ask for help if you need it.   
Most people want to help!  
? Get up slowly after sitting   
or laying down  
? Wear a medical alert device   
or keep cell phone in your   
pocket  
? Use night lights, especially   
areas near a bathroom  
? Keep the items you use often   
within reach on a small stool   
or end table  
? Use an assistive device such   
as walker or cane, as directed   
by provider/physical therapy  
? Use a non-slip mat and grab   
bars in your bathroom. Look for   
home health sections for best   
options  
  
Other Areas to Focus On  
? Exercise and nutrition:   
Regular exercise or taking a   
falls prevention class are   
great ways improve strength and   
balance. Don t forget to stay   
hydrated and bring a snack!  
? Medicine side effects: Some   
medicines can make you sleepy   
or dizzy, which could cause a   
fall. Ask your healthcare   
provider about the side effects   
your medicines could cause. Be   
sure to let them know if you   
take any vitamins or   
supplements as well.  
? Tripping hazards: Remove   
items you could trip on, such   
as loose mats, rugs, cords, and   
clutter. Wear closed toe shoes   
with rubber soles.  
? Health and wellness: Get   
regular checkups with your   
healthcare provider, plus   
routine vision and hearing   
screenings. Talk with your   
healthcare provider about:  
o Your medicines and the   
possible side effects - bring   
them in a bag if that is   
easier!  
o Problems with balance or   
feeling dizzy  
o Ways to promote bone health,   
such as Vitamin D and calcium   
supplements  
o Questions or concerns about   
falling  
  
*Ask your healthcare team if   
you have questions  
  
[facility]2022  
Electronically signed by   
JYOTI Vazquez at   
2024 9:27 AM EST  
  
documented in this encounter            [facility]  
Work Phone:   
[phone]  
   
                                        2024 Note     HNO ID: [id number]  
Author: Dilcia White APRN.CNP  
Service: ?  
Author Type: Nurse Practitioner  
Type: Progress Notes  
Filed: 2024 9:41 AM  
Note Text:  
patient declined chaperone  
OB point of care ultrasound was   
performed. See imaging tab for   
details.  
Kasey Osuna LPN  
INITIAL OB ASSESSMENT  
HPI:  
Karina is a 33 year old    
White here to establish   
Obstetrical Care.  
Patient's last menstrual period   
was 2023. from OB Dating   
Form.  
Do you have regular   
periods/menstrual cycles? Yes  
Pregnancy was unplanned but   
accepted  
Complaints: (!) Shortness of   
breath  
OB History  
 T0  L0  
SAB0 IAB0 Ectopic0 Multiple0   
Live Births0  
How many pregnancies have you   
had before? 0  
Have you had a prior conner   
 birth between 20w and   
36w6d? No  
Did you present in active   
spontaneous labor or have   
ruptured membranes, or  
advanced cervical dilation   
(greater than or equal to 4 cm)   
or effacement?  
No  
Did you have a previous baby   
with a GBS Infection? No  
Please select all that apply   
for any prior pregnancy: N/A  
Did you have a partner with   
Herpes? (!) Yes  
Prior : never  
History of 4th degree   
laceration: NA  
Patient's Risk Screening for   
 delivery:  
MEDICAL/PSYCHOSOCIAL HISTORY:  
History of hemorrhage or   
bleeding concerns: No  
Thyroid Disease: No  
History of chronic   
hypertension: No  
History of pre-existing   
diabetes: No  
BMI 35.25 kg/(m2)  
History of abnormal pap: Yes  
Prior treatment for cervical   
dysplasia: colposcopy.  
History of STDs: HPV  
Tobacco use: No  
E-Cigarette/Vaping Use: No  
Caffeine use: Yes  
Drug use: No  
Alcohol use: No  
Multivitamin with Folic acid:   
Yes  
[place] or    
heritage: No  
Would refuse blood transfusion   
if medically necessary: No  
No results found for:  ABORHD   
Social Needs:  
How often does this describe   
you? I don't have enough money   
to pay my  
bills: Never  
Within the past 12 months, have   
you worried that your food   
would run out  
before you had money to buy   
more? Never  
In the past 12 months, has lack   
of reliable transportation kept   
you from  
going to medical appointments   
or work, or from getting things   
needed for  
daily living? Never  
In the past 12 months, have you   
had any concerns about having a   
place to  
live, or about the condition or   
quality of your housing? Never  
Would you like more information   
on any of the following (please   
check all  
that apply)? Centering   
Pregnancy (group prenatal care   
classes), ,  
Midwife care  
Social History:  
Do you have any history of   
depression, anxiety, PTSD, or   
other mood  
problems? Yes  
Do you have a history of abuse   
or trauma that may impact your   
birth  
experience? No  
Are you currently employed? Yes  
Depression/Anxiety Screening:  
denies symptoms of depression.  
OB  Depression and   
Anxiety Screening- This   
Encounter (since  
2024)  
Over the past 2 weeks have you   
felt down, depressed, or   
hopeless?  
Negative  
Over the past two weeks, have   
you felt little interest or   
pleasure in  
doing things?? Negative  
Feeling nervous, anxious or on   
edge 0-Not at all  
Not being able to stop or   
control worrying 0-Not al all  
Anxiety Pre-Screening Total (If   
>/= 3 additional questions will   
be  
reviewed) 0  
ACOG Recommended Screening:  
Screening for early gestational   
diabetes testing:  
Criteria for early testing   
requires elevated BMI plus one   
other risk  
factor:  
BMI 35.25 kg/(m2) (risk factor   
if > than 25 or 23 in    
[place])  
Additional risk factors: None  
She does meet ACOG criteria for   
early gestational DM screening.  
Screening for low dose aspirin   
use for the prevention of   
pre-eclampsia:  
Low dose aspirin should be   
considered if the patient has   
one high or two  
moderate risk factors:  
High risk factors: None  
Moderate risk ractors:   
Nulliparity and Obesity (body   
mass index greater  
than 30) She does meet criteria   
for low dose ASA  
Marital Status:Co-habitating  
Partner:  
Name: Sawyer  
Age: 34  
Occupation:   
Gender: Male  
History of STDs: None  
PAST MEDICAL HISTORY  
Diagnosis Date  
Abnormal Pap smear of cervix  
Cervical dysplasia, mild   
2016  
PAST SURGICAL HISTORY  
Procedure Laterality Date  
COLONOSCOPY 2016  
EXTRACTION, ERUPTED TOOTH OR   
EXPOSED ROOT (ELEVATION AND/OR   
FORCEPS  
REMOVAL) 2004  
TONSILLECTOMY AND ADENOIDECTOMY   
HX 2000  
Current Outpatient Medications  
Medication Sig Dispense Refill  
sertraline (ZOLOFT) 50 mg   
tablet  
omeprazole (PRILOSEC) 40 mg   
capsule Take 40 mg by mouth   
once daily.  
prenatal 21/iron fu/folic acid   
(PRENATAL COMPLETE ORAL) Take   
by mouth.  
FEXOFENADINE/PSEUDOEPHEDRINE   
(ALLEGRA-D 24 HOUR ORAL) Take   
by mouth as  
needed. (Patient not taking:   
Reported on 2024)  
No current   
facility-administered   
medications for this visit.  
Allergies As of Date:   
2024  
(No Known Allergies)  
Fully Assessed 2024  
Does patient have penicillin   
allergy: No  
REVIEW OF SYSTEMS:  
GENERAL: Negative for: Fever or   
Chills  
HEENT: Negative fo (more   
content not included)...                [facility]  
   
                                                    2023 History of Presen  
t   
illness Narrative                       Formatting of this note might   
be different from the original.  
Karina Lindsay is a 32 year old   
 female who presents for   
management of Bartholin's gland   
cyst  
  
Patient noticed right labial   
swelling 1 years ago.  
Discharge: No  
Fever/chills: No  
Sexually active: Yes  
History of STDS: None and HPV  
Patient concerns for STD   
exposure: No.  
  
EXAM: /70   Ht 5' 6    
(1.68m)   Wt 212 lb (96.2kg)     
LMP 2023   BMI 34.23   
kg/(m^2).  
  
PELVIC: right Bartholin's gland   
cyst identified, approximately   
3-4 cm with fluctuance.  
  
UNIVERSAL PROTOCOL / SAFETY   
CHECKLIST  
  
Procedure to be Performed:   
incision & drainage of   
bartholin's cyst with word   
catheter placement  
  
Sign In:  
A Moment of CARE was completed.  
Personnel directly involved   
with the procedure wore the   
appropriate PPE (Personal   
Protective Equipment).  
  
Patient/Surrogate   
Stated/Verified: PATIENT   
VERIFIED(optional for EMERGENT   
procedures): Patient name, Date   
of Birth, Relevant allergies,   
and The intended procedure  
  
Time Out Communication:  
Intended patient and procedure   
match the source documents.  
Consent documented and matches   
the intended procedure.  
Medications required for   
procedure verified.  
No implant(s) inserted.  
  
Sign Out:  
SIGN OUT (optional for EMERGENT   
procedures): No specimen   
collected.  
All instruments, equipment,   
possible retained foreign   
bodies accounted for.  
Post-procedure follow-up   
management communicated and   
Plan of Care Visit completed   
when applicable.  
  
Lorena Conway Ma  
  
Implant/WORD Catheter   
verification:  
WORD lot #: [id number]  
Exp date: 2024  
  
  
PROCEDURE NOTE:  
  
right Labial area was cleansed   
with betadine and anesthetized   
with 6mL 1% lidocaine with   
1:100,000 epi. Scalpel used to   
make medial incision into   
Bartholin's gland with return   
of thin and tan fluid. Word   
catheter placed and inflated.   
Hemostasis obtained with   
pressure.  
  
Procedure Summary: Patient   
tolerated procedure well.  
  
ASSESSMENT: Bartholin's gland   
cyst  
  
PLAN:  
Antibiotics given: No.  
Post-procedure instructions   
reviewed and written material   
given to the patient.  
Patient will remove Word   
catheter if needed in 7 days.  
Return to office as needed  
  
Xenia Martines MD  
  
  
Electronically signed by   
Lorena Conway Ma at   
2023 10:47 AM EST  
Formatting of this note is   
different from the original.  
Chaperone offered: Patient   
declines.  
  
Karina is a 32 year old    
who presents for an annual   
gynecologic exam.  
  
Menses: cycles every 25-27 days   
and 5 days of flow.  
Contraception: none  
HPV vaccine: Yes  
Last Pap: 2022 normal  
HPV: 2022 negative  
History of abnormal pap: Yes -   
LSIL 2018 (MARIO 1 on colpo)  
Last mammogram: never  
  
OB History  
 T0  L0  
SAB0 IAB0 Ectopic0 Multiple0   
Live Births0  
  
Gyn History  
  
LMP: 2021, Having periods  
Age at Menarche:  
Age at First Pregnancy:  
Age at Menopause:  
Gyn History Comments:  
Sexual Activity: Yes; Male  
Contraception: None  
  
  
  
PAST MEDICAL HISTORY  
Diagnosis Date  
Cervical dysplasia, mild 2016  
  
PAST SURGICAL HISTORY  
Procedure Laterality Date  
COLONOSCOPY   
EXTRACTION, ERUPTED TOOTH OR   
EXPOSED ROOT (ELEVATION AND/OR   
FORCEPS REMOVAL) 2004  
TONSILLECTOMY AND ADENOIDECTOMY   
HX 2000  
  
FAMILY HISTORY  
Problem Relation Age of Onset  
Hypertension Mother  
other (Depression) Mother  
Heart Father  
Hypertension Father  
other (Depression) Father  
Alzheimer's Disease Maternal   
Grandmother  
Alzheimer's Disease Maternal   
Grandfather  
Diabetes Maternal Grandfather  
Heart Paternal Grandmother  
Heart Paternal Grandfather  
SOCIAL HISTORY  
Social History  
  
Tobacco Use  
Smoking status: Never  
Smokeless tobacco: Never  
Tobacco comments:  
patient is exposed to second   
hand cigarette smoke in her   
home  
Vaping Use  
Vaping Use: Never used  
Substance Use Topics  
Alcohol use: No  
Drug use: No  
  
REVIEW OF SYSTEMS  
Abdomen: No abdominal pain,   
nausea, vomiting, diarrhea, or   
constipation. No bloating,   
early satiety, indigestion, or   
increased flatulence.  
Bladder: No dysuria, gross   
hematuria, urinary frequency,   
urinary urgency, or   
incontinence.  
Breast: No breast lumps, nipple   
d/c, overlying skin changes,   
redness or skin retraction.  
Allergies and current   
medication updated:Yes  
  
EXAM: Ht 5' 6  (1.68m)   Wt 212   
lb (96.2kg)   LMP 2021     
BMI 34.23 kg/(m^2).  
  
  
GENERAL: pleasant,    
female in no apparent distress  
BREAST: soft, non-tender,   
symmetric, no dominant mass,   
normal nipple-areolar complex,   
no lymphadenopathy, and no   
nipple discharge  
CHEST: Normal inspiratory   
effort  
ABDOMEN: soft, non-tender, and   
no masses  
PELVIC: external genitalia   
normal, normal Bartholin's   
glands, urethra, [place]'s   
glands, no vulvar lesions other   
than right bartholin's cyst, no   
cervical lesions, good vaginal   
support, physiologic discharge   
present, normal appearing   
perineal body and perianal   
region  
BIMANUAL: uterus normal size,   
shape and consistency, no   
adnexal masses, and non-tender  
RECTOVAGINAL: deferred.  
NEURO: alert and oriented   
x3,exam grossly non-focal  
EXTREMITIES: normal  
  
ASSESSMENT/PLAN:  
1) Health maintenance:  
Pap done with HPV.  
Mammogram starting age 40.  
Nutrition, exercise and routine   
health maintenance exams   
reviewed.  
2) Contraception: none as   
desires pregnancy. Advised on   
OPKs, folic acid & timing for   
infertility   
evaluation/treatment.  
3) Bartholin's cyst - proceed   
with I&D  
4) Follow up one year or sooner   
as needed  
  
Xenia Martines MD  
  
Electronically signed by Xenia Martines MD at 2023 10:47   
AM EST  
documented in this encounter            [facility]  
   
                                        2023 Instructions   
  
  
Lorena Conway Ma -   
2023 9:49 AM   
ESTFormatting of this note   
might be different from the   
original.  
Patient instructions following   
Bartholin's gland incision and   
drainage  
  
It is normal to have some pain   
and discomfort following   
incision and drainage. You may   
take over-the-counter   
medication such as ibuprofen   
(Motrin) or acetaminophen   
(Tylenol), following package   
instructions for dosage.  
  
You can expect to see some   
bleeding initially and   
continued drainage of the cyst   
for the next 1-2 weeks. If a   
Word catheter was inflated and   
left in place, your doctor will   
have you schedule an   
appointment for removal. If the   
catheter falls out on its own,   
let your doctor know. Do not   
try to reinsert it.  
  
You may be given antibiotics if   
your doctor thinks the cyst is   
infected. If so, it is   
important to complete the   
entire course of antibiotics   
even if your symptoms resolve   
before you have finished all   
the pills.  
  
You should avoid vigorous   
exercise and not put anything   
in the vagina for 2 weeks. Do   
not douche or use   
creams/powders in the affected   
area unless instructed by your   
physician.  
  
You should call your doctor if   
you experience any of the   
following:  
Enlarging cyst  
Spreading redness in the skin  
Worsening pain  
Fever or chills  
Excessive bleeding  
Other concerning symptoms  
Electronically signed by   
Lorena Conway Ma at   
2023 9:49 AM EST  
  
documented in this encounter            [facility]  
   
                                                    2022 History of Presen  
t   
illness Narrative                       Karina comes to the office for   
complaints of dyspepsia. Acid   
reflux has been occurring since   
last year. She believes it   
worsened when she gained 60#   
after breaking her leg 2Y ago.   
No frequent use NSAID/alcohol   
use. Dyspepsia worse @ HS.   
Bowels as per usual. Caffeine   
consumption: 1-2C QAM. Energy   
drink: one daily. GERD better   
continues w/ fullness &   
burping; epigastric burning   
better. No pepcid use.   
Nonsmoker. No trouble or   
painful swallowing.Reviewed   
labs w/ pt todayAnxiety-   
better; less anxiety on daily   
basis Lexapro initially helped   
w/ depression and with anxiety.   
No panic attacks. Sleep: well.   
Anxious about random things   
3-4d/w.Cervical CA Screening:   
PAP (recently completed   
23) UTD thru her OB GYN in   
[place]. Ash OLSEN Bartholin   
gland                                   [facility]  
Work Phone:   
[phone]  
   
                                                    2021 History of Presen  
t   
illness Narrative                       Karina comes to the office for   
complaints of dyspepsia. Acid   
reflux has been occurring since   
last year. She believes it   
worsened when she gained   
60#after breaking her leg 2Y   
ago. Also notes stress/anxiety   
makes heartburn worse.   
TUMS/Rolaids offer no help. +   
nausea. + burping. + epigastric   
pain. Food triggers: none.   
Sometimes food helps ease the   
epigastric discomfort. No   
frequent use NSAID/alcohol use.   
Dyspepsia worse @ HS or in AM.   
Bowels as per usual. Pepcid   
helps (for only part of the   
day). Caffeine consumption:   
1-2C QAM. Energy drink: one   
daily.Anxiety- depends on the   
day or the time of the month;   
has had anxiety for most of   
life. Lexapro initially helped   
w/ depression and with anxiety.   
No panic attacks. Sleep: well.   
More irritable with small   
issues. Anxious about random   
things 3-4d/w.Has established   
OB GYN that she follows with in   
. Receives her PAP's in   
.c/o bottoms of both feet   
painful and posterior right   
calf. Denies   
warmth/tenderness/swelling to   
joints.                                 [facility]  
Work Phone:   
[phone]  
   
                                                    2021 History of Presen  
t   
illness Narrative                         
  
  
Formatting of this note might   
be different from the original.  
  
  
Date of Service : 2021  
  
HISTORY:  
The patient is a 31-year-old   
female, who is here today for   
right ankle  
varus instability followup. She   
wore an ankle lacer brace for a   
little  
while, not full 5 weeks. She   
wore compression sleeve as   
well, but now  
she does not need any of that.   
She had an allergy release,   
tension  
release-type procedure done by   
another practitioner. She notes   
that  
really did relieve a lot of her   
stress, tension, and pain.   
Overall, she  
is not having the instability   
of the pain that she is having   
before.  
  
PHYSICAL EXAMINATION:  
GENERAL: She is in no acute   
distress. She is alert, awake,   
and  
oriented x3. Affect is normal.   
VITAL SIGNS: Temperature is   
97.4  
degrees Fahrenheit. Height is 5   
feet 6 inches. Weight is 84.6   
kg. On  
her right ankle, she has no   
erythema, edema, or ecchymosis   
present.  
There is no definitive endpoint   
to inversion, but no pain. No  
tenderness to palpation to any   
location. She can do a double   
and single  
heel rise, the heels invert and   
she has no pain.  
  
DIAGNOSIS:  
Right ankle pain and   
instability, currently   
resolved.  
  
PLAN:  
Discussed with the patient   
today that she does not have a   
definitive  
endpoint to inversion. If she   
feels that there was   
instability present  
or frequently rolls her ankle,   
that she could have surgical   
intervention  
for that, but it is not   
warranted unless she is having   
clinical  
symptoms. She will return to   
activities as tolerated with no  
restrictions and follow up with   
me if her pain returns.  
  
(DOC:[id number])  
  
  
  
Electronically signed by   
Renetta Pacheco DPM at   
2021 9:07 AM   
EDTdocumented in this encounter         [facility]  
  
  
  
                                                    Evaluation note   
  
  
  
                                                    Diagnosis  
   
                                                      
  
  
Right ankle pain, unspecified chronicity- Primary  
   
                                                      
  
  
Instability of right ankle joint  
   
                                                      
  
  
Ankle varus, acquired, right  
  
documented in this encounter  
[facility]   
  
                                                    Diagnosis  
   
                                                      
  
  
Encounter for gynecological examination (general) (routine) without abnormal   
findings- Primary  
   
                                                      
  
  
Screening for cervical cancer  
  
  
Screening for malignant neoplasm of the cervix  
   
                                                      
  
  
Encounter for screening for human papillomavirus (HPV)  
  
  
Special screening examination for human papillomavirus (HPV)  
   
                                                      
  
  
Cyst of Bartholin's gland  
  
documented in this encounter  
[facility]   
  
                                                    Diagnosis  
   
                                                      
  
  
Depressive disorder- Primary  
  
  
Depressive disorder, not elsewhere classified  
   
                                                      
  
  
Gastroesophageal reflux disease, unspecified whether esophagitis present  
  
documented in this encounter  
[facility]  
Work Phone: [phone] note*   
  
                                                    Diagnosis  
   
                                                      
  
  
13 weeks gestation of pregnancy- Primary  
  
  
Pregnant state, incidental  
   
                                                      
  
  
Depressive disorder  
  
  
Depressive disorder, not elsewhere classified  
  
documented in this encounter  
[facility]   
  
                                                    Diagnosis  
   
                                                      
  
  
First trimester screening- Primary  
  
  
Other specified  screening  
   
                                                      
  
  
13 weeks gestation of pregnancy  
  
  
Pregnant state, incidental  
  
documented in this encounter  
[facility]   
  
                                                    Diagnosis  
   
                                                      
  
  
Encounter for supervision of normal first pregnancy in second trimester- Primary  
  
  
Supervision of normal first pregnancy  
   
                                                      
  
  
17 weeks gestation of pregnancy  
  
  
Pregnant state, incidental  
   
                                                      
  
  
Obesity affecting pregnancy in second trimester, unspecified obesity type  
   
                                                      
  
  
Depression affecting pregnancy in second trimester, antepartum  
   
                                                      
  
  
Heartburn during pregnancy in second trimester  
  
documented in this encounter  
[facility]   
  
                                                    Diagnosis  
   
                                                      
  
  
Encounter for supervision of normal first pregnancy in second trimester- Primary  
  
  
Supervision of normal first pregnancy  
   
                                                      
  
  
21 weeks gestation of pregnancy  
  
  
Pregnant state, incidental  
   
                                                      
  
  
Obesity affecting pregnancy in second trimester, unspecified obesity type  
   
                                                      
  
  
Depressive disorder  
  
  
Depressive disorder, not elsewhere classified  
   
                                                      
  
  
Heartburn during pregnancy in second trimester  
  
documented in this encounter  
[facility]   
  
                                                    Diagnosis  
   
                                                      
  
  
Encounter for fetal anatomic survey- Primary  
   
                                                      
  
  
21 weeks gestation of pregnancy  
  
  
Pregnant state, incidental  
   
                                                      
  
  
Obesity affecting pregnancy in second trimester, unspecified obesity type  
  
documented in this encounter  
[facility]   
  
                                                    Diagnosis  
   
                                                      
  
  
25 weeks gestation of pregnancy- Primary  
  
  
Pregnant state, incidental  
   
                                                      
  
  
Encounter for supervision of normal first pregnancy in second trimester  
  
  
Supervision of normal first pregnancy  
   
                                                      
  
  
Obesity affecting pregnancy in second trimester, unspecified obesity type  
   
                                                      
  
  
Depressive disorder  
  
  
Depressive disorder, not elsewhere classified  
   
                                                      
  
  
Depression affecting pregnancy in second trimester, antepartum  
  
documented in this encounter  
[facility]   
  
                                                    Diagnosis  
   
                                                      
  
  
Encounter for supervision of normal first pregnancy in third trimester- Primary  
  
  
Supervision of normal first pregnancy  
   
                                                      
  
  
28 weeks gestation of pregnancy  
  
  
Pregnant state, incidental  
   
                                                      
  
  
Obesity affecting pregnancy in second trimester, unspecified obesity type  
   
                                                      
  
  
Need for vaccination  
  
  
Need for prophylactic vaccination and inoculation against unspecified single 
disease  
   
                                                      
  
  
Heartburn during pregnancy in second trimester  
   
                                                      
  
  
Rh negative state in antepartum period  
  
  
Rhesus isoimmunization affecting management of mother, antepartum condition  
   
                                                      
  
  
Depressive disorder  
  
  
Depressive disorder, not elsewhere classified  
  
documented in this encounter  
[facility] note*   
  
                                                    Diagnosis  
   
                                                      
  
  
Elevated glucose tolerance test- Primary  
  
  
Impaired glucose tolerance test  
   
                                                      
  
  
Anemia during pregnancy in third trimester  
  
documented in this encounter  
[facility] note*   
  
                                                    Diagnosis  
   
                                                      
  
  
Gestational diabetes mellitus (GDM) in third trimester, gestational diabetes 
method   
of control unspecified- Primary  
  
documented in this encounter  
[facility]   
  
                                                    Diagnosis  
   
                                                      
  
  
Gestational diabetes mellitus (GDM) in third trimester, gestational diabetes 
method   
of control unspecified  
  
documented in this encounter  
[facility]   
  
                                                    Diagnosis  
   
                                                      
  
  
Gestational diabetes mellitus (GDM) in third trimester, gestational diabetes 
method   
of control unspecified  
  
documented in this encounter  
[facility]   
  
                                                    Diagnosis  
   
                                                      
  
  
30 weeks gestation of pregnancy- Primary  
  
  
Pregnant state, incidental  
   
                                                      
  
  
Diet controlled gestational diabetes mellitus (GDM) in third trimester  
   
                                                      
  
  
Encounter for supervision of normal first pregnancy in third trimester  
  
  
Supervision of normal first pregnancy  
   
                                                      
  
  
GDM, class A2  
  
  
Abnormal maternal glucose tolerance, complicating pregnancy, childbirth, or the   
puerperium, unspecified as to episode of care  
   
                                                      
  
  
Anemia during pregnancy in third trimester  
  
documented in this encounter  
[facility] note*   
  
                                                    Diagnosis  
   
                                                      
  
  
GDM, class A2- Primary  
  
  
Abnormal maternal glucose tolerance, complicating pregnancy, childbirth, or the   
puerperium, unspecified as to episode of care  
   
                                                      
  
  
Encounter for supervision of high risk pregnancy in third trimester, antepartum  
   
                                                      
  
  
32 weeks gestation of pregnancy  
  
  
Pregnant state, incidental  
   
                                                      
  
  
Anemia during pregnancy in third trimester  
   
                                                      
  
  
Obesity affecting pregnancy in second trimester, unspecified obesity type  
  
documented in this encounter  
[facility]   
  
                                                    Diagnosis  
   
                                                      
  
  
GDM, class A2- Primary  
  
  
Abnormal maternal glucose tolerance, complicating pregnancy, childbirth, or the   
puerperium, unspecified as to episode of care  
   
                                                      
  
  
Other obesity due to excess calories affecting pregnancy in third trimester  
   
                                                      
  
  
32 weeks gestation of pregnancy  
  
  
Pregnant state, incidental  
  
documented in this encounter  
[facility]   
  
                                                    Diagnosis  
   
                                                      
  
  
Anemia during pregnancy in third trimester- Primary  
  
documented in this encounter  
[facility]   
  
                                                    Diagnosis  
   
                                                      
  
  
Maternal iron deficiency anemia complicating pregnancy, third trimester- Primary  
   
                                                      
  
  
Impaired intestinal absorption  
  
  
Unspecified intestinal malabsorption  
  
documented in this encounter  
[facility]   
  
                                                    Diagnosis  
   
                                                      
  
  
32 weeks gestation of pregnancy- Primary  
  
  
Pregnant state, incidental  
   
                                                      
  
  
GDM, class A2  
  
  
Abnormal maternal glucose tolerance, complicating pregnancy, childbirth, or the   
puerperium, unspecified as to episode of care  
   
                                                      
  
  
Encounter for supervision of high risk pregnancy in third trimester, antepartum  
  
[facility] this encounter  
[facility]   
  
                                                    [facility]  
   
                                                      
  
  
33 weeks gestation of pregnancy- Primary  
  
  
Pregnant state, incidental  
   
                                                      
  
  
Encounter for supervision of high risk pregnancy in third trimester, antepartum  
   
                                                      
  
  
GDM, class A2  
  
  
Abnormal maternal glucose tolerance, complicating pregnancy, childbirth, or the   
puerperium, unspecified as to episode of care  
  
documented in this encounter  
[facility]   
  
                                                    Diagnosis  
   
                                                      
  
  
33 weeks gestation of pregnancy- Primary  
  
  
Pregnant state, incidental  
   
                                                      
  
  
Encounter for supervision of high risk pregnancy in third trimester, antepartum  
   
                                                      
  
  
Other obesity due to excess calories affecting pregnancy in third trimester  
   
                                                      
  
  
Anemia during pregnancy in third trimester  
   
                                                      
  
  
Insulin controlled gestational diabetes mellitus (GDM) in third trimester  
  
documented in this encounter  
[facility]   
  
                                                    Diagnosis  
   
                                                      
  
  
34 weeks gestation of pregnancy- Primary  
  
  
Pregnant state, incidental  
   
                                                      
  
  
Encounter for supervision of high risk pregnancy in third trimester, antepartum  
   
                                                      
  
  
Other obesity due to excess calories affecting pregnancy in third trimester  
   
                                                      
  
  
Insulin controlled gestational diabetes mellitus (GDM) in third trimester  
   
                                                      
  
  
Elevated blood pressure reading without diagnosis of hypertension  
  
documented in this encounter  
[facility]   
  
                                                    Diagnosis  
   
                                                      
  
  
Impaired intestinal absorption- Primary  
  
  
Unspecified intestinal malabsorption  
   
                                                      
  
  
Maternal iron deficiency anemia complicating pregnancy, third trimester  
  
documented in this encounter  
[facility]   
  
                                                    Diagnosis  
   
                                                      
  
  
Impaired intestinal absorption- Primary  
  
  
Unspecified intestinal malabsorption  
   
                                                      
  
  
Maternal iron deficiency anemia complicating pregnancy, third trimester  
  
documented in this encounter  
[facility]   
  
                                                    Diagnosis  
   
                                                      
  
  
34 weeks gestation of pregnancy- Primary  
  
  
Pregnant state, incidental  
   
                                                      
  
  
Encounter for supervision of high risk pregnancy in third trimester, antepartum  
   
                                                      
  
  
Other obesity due to excess calories affecting pregnancy in third trimester  
   
                                                      
  
  
Insulin controlled gestational diabetes mellitus (GDM) in third trimester  
   
                                                      
  
  
Anemia during pregnancy in third trimester  
   
                                                      
  
  
Elevated BP without diagnosis of hypertension  
  
documented in this encounter  
[facility]   
  
                                                    Diagnosis  
   
                                                      
  
  
Encounter for supervision of high risk pregnancy in third trimester, antepartum-
   
Primary  
   
                                                      
  
  
35 weeks gestation of pregnancy  
  
  
Pregnant state, incidental  
   
                                                      
  
  
Other obesity due to excess calories affecting pregnancy in third trimester  
   
                                                      
  
  
Insulin controlled gestational diabetes mellitus (GDM) in third trimester  
  
[facility] in this encounter  
[facility]   
  
                                                    Diagnosis  
   
                                                      
  
  
Impaired intestinal absorption- Primary  
  
  
Unspecified intestinal malabsorption  
   
                                                      
  
  
Maternal iron deficiency anemia complicating pregnancy, third trimester  
  
documented in this encounter  
[facility]   
  
                                                    Diagnosis  
   
                                                      
  
  
Encounter for supervision of high risk pregnancy in third trimester, antepartum-
   
Primary  
   
                                                      
  
  
35 weeks gestation of pregnancy  
  
  
Pregnant state, incidental  
   
                                                      
  
  
Other obesity due to excess calories affecting pregnancy in third trimester  
   
                                                      
  
  
Insulin controlled gestational diabetes mellitus (GDM) in third trimester  
   
                                                      
  
  
Anemia during pregnancy in third trimester  
  
documented in this encounter  
[facility]   
  
                                                    Diagnosis  
   
                                                      
  
  
36 weeks gestation of pregnancy- Primary  
  
  
Pregnant state, incidental  
   
                                                      
  
  
Encounter for supervision of high risk pregnancy in third trimester, antepartum  
   
                                                      
  
  
Insulin controlled gestational diabetes mellitus (GDM) in third trimester  
  
documented in this encounter  
[facility] note*   
  
                                                    Diagnosis  
   
                                                      
  
  
Encounter for ultrasound to check fetal growth- Primary  
  
  
Encounter for routine screening for malformation using ultrasonics  
   
                                                      
  
  
GDM, class A2  
  
  
Abnormal maternal glucose tolerance, complicating pregnancy, childbirth, or the   
puerperium, unspecified as to episode of care  
   
                                                      
  
  
Polyhydramnios in third trimester complication, single or unspecified fetus  
   
                                                      
  
  
36 weeks gestation of pregnancy  
  
  
Pregnant state, incidental  
  
documented in this encounter  
[facility]   
  
                                                    Diagnosis  
   
                                                      
  
  
36 weeks gestation of pregnancy- Primary  
  
  
Pregnant state, incidental  
   
                                                      
  
  
Encounter for supervision of high risk pregnancy in third trimester, antepartum  
   
                                                      
  
  
Insulin controlled gestational diabetes mellitus (GDM) in third trimester  
   
                                                      
  
  
Other obesity due to excess calories affecting pregnancy in third trimester  
  
documented in this encounter  
[facility] ([facility] - 
  Authorized  
  
                          Specialty    Diagnoses / Procedures Referred By Contac  
t Referred To Contact  
   
                                        Primary Care          
  
  
Diagnoses  
  
  
Gastroesophageal reflux   
disease, unspecified   
whether esophagitis present  
  
  
  
Procedures  
  
  
Follow Up In Primary Care -   
Farhad Galvez APRN-CNP  
  
  
[address] [place] [address]  
  
  
Phone: [phone]  
  
  
Fax: [phone]                         
  
  
  
  
  
                    Referral ID Status    Reason    Start Date Expiration Date V  
isits   
Requested                               Visits   
Authorized  
   
                [id number] Authorized         2024 1       1  
  
  
  
  
Electronically signed by Farhad MONTES at 2024 9:24 AM EST  
  
  
[facility]  
Work Phone: [phone] for referral (narrative)* Diagnostic Procedure 
  Only (Routine) - Pending Review  
  
                          Specialty    Diagnoses / Procedures Referred By Contac  
t Referred To Contact  
   
                                                    [facility]                                 
  
  
Diagnoses  
  
  
13 weeks gestation of   
pregnancy  
  
  
  
Procedures  
  
  
OBSTETRIC ULTRASOUND   
WHI  
  
  
US PREG UTERUS AFTER   
1ST TRIMEST    
GESTATION                                 
  
  
Shayna Escalera MD  
  
  
[address] [place] [address]  
  
  
Phone: [phone]  
  
  
Fax: [phone]                         
  
  
[address] [address]  
  
  
  
                          Referral ID  Status       Reason       Start   
Date                                    Expiration   
Date                                    Visits   
Requested                               Visits   
Authorized  
   
                                        [id number]            Pending   
Review                                    
  
  
Auto-Generat  
ed Referral     2024       1               1  
  
  
  
  
Electronically signed by Shayna Escalera MD at 2024 11:59 AM EST  
  
  
[facility] for referral (narrative)* Diagnostic Procedure Only 
  (Routine) - Pending Review  
  
                          Specialty    Diagnoses / Procedures Referred By Contac  
t Referred To Contact  
   
                                                    [facility]                                 
  
  
Diagnoses  
  
  
Encounter for   
supervision of normal   
first pregnancy in   
second trimester  
  
  
17 weeks gestation of   
pregnancy  
  
  
  
Procedures  
  
  
OBSTETRIC ULTRASOUND   
[facility]I  
  
  
US PREG UTERUS AFTER   
1ST TRIMEST    
GESTATION                                 
  
  
Bobbi Jalloh APRN.CNP  
  
  
[address]  
  
  
[place] [address]  
  
  
Phone: [phone]  
  
  
Fax: [phone]                         
  
  
[address] [address]  
  
  
  
                          Referral ID  Status       Reason       Start   
Date                                    Expiration   
Date                                    Visits   
Requested                               Visits   
Authorized  
   
                                        [id number]            Pending   
Review                                    
  
  
Auto-Generat  
ed Referral     3/11/2024       3/11/2025       1               1  
  
  
  
  
Electronically signed by Bobbi Jalloh APRN.CNP at 2024 9:23 AM EDT  
  
  
[facility] for referral (narrative)* Outpatient Procedure (Routine) 
  - Pending Review  
  
                          Specialty    Diagnoses / Procedures Referred By Contac  
t Referred To Contact  
   
                                                    [facility]                                 
  
  
Diagnoses  
  
  
28 weeks gestation of   
pregnancy  
  
  
Obesity affecting   
pregnancy in second   
trimester, unspecified   
obesity type  
  
  
  
Procedures  
  
  
FETAL NON-STRESS TEST  
  
  
FETAL NON-STRESS TEST                     
  
  
Letty Beaver APRN.CNM  
  
  
[address]  
  
  
[place] [address]  
  
  
Phone: [phone]  
  
  
Fax: [phone]                         
  
  
[address] [address]  
  
  
  
                          Referral ID  Status       Reason       Start   
Date                                    Expiration   
Date                                    Visits   
Requested                               Visits   
Authorized  
   
                                        [id number]            Pending   
Review                                    
  
  
Auto-Generat  
ed Referral     2024       1               1  
  
  
  
  
Electronically signed by Letty Beaver APRN.CNM at 2024 11:20 AM EDT  
  
  
* Diagnostic Procedure Only (Routine) - Pending Review  
  
                          Specialty    Diagnoses / Procedures Referred By Contac  
t Referred To Contact  
   
                                                    [facility]                                 
  
  
Diagnoses  
  
  
28 weeks gestation of   
pregnancy  
  
  
Obesity affecting   
pregnancy in second   
trimester, unspecified   
obesity type  
  
  
  
Procedures  
  
  
OBSTETRIC ULTRASOUND   
[facility]  
  
  
US PREG UTERUS AFTER   
1ST TRIMEST    
GESTATION                                 
  
  
Letty Beaver APRN.CNM  
  
  
[address]  
  
  
[place] [address]  
  
  
Phone: [phone]  
  
  
Fax: [phone]                         
  
  
[facility]  
  
  
[address] [place] [address]  
  
  
  
                          Referral ID  Status       Reason       Start   
Date                                    Expiration   
Date                                    Visits   
Requested                               Visits   
Authorized  
   
                                        [id number]            Pending   
Review                                    
  
  
Auto-Generat  
ed Referral     2024       3               1  
  
  
  
  
Electronically signed by Letty Beaver APRN.CNM at 2024 11:20 AM EDT  
  
  
[facility] for referral (narrative)* Outpatient Procedure (Routine) 
  - Pending Review  
  
                          Specialty    Diagnoses / Procedures Referred By Contac  
t Referred To Contact  
   
                                                    [facility]                                 
  
  
Diagnoses  
  
  
30 weeks gestation of   
pregnancy  
  
  
Encounter for   
supervision of normal   
first pregnancy in   
third trimester  
  
  
GDM, class A2  
  
  
  
Procedures  
  
  
FETAL NON-STRESS TEST  
  
  
FETAL NON-STRESS TEST                     
  
  
Shayna Escalera MD  
  
  
[address]  
  
  
[place] [address]  
  
  
Phone: [phone]  
  
  
Fax: [phone]                         
  
  
[facility]  
  
  
[address] [place] [address]  
  
  
  
                          Referral ID  Status       Reason       Start   
Date                                    Expiration   
Date                                    Visits   
Requested                               Visits   
Authorized  
   
                                        [id number]            Pending   
Review                                    
  
  
Auto-Generat  
ed Referral     6/10/2024       6/10/2025       10              1  
  
  
  
  
Electronically signed by Shayna Escalera MD at 06/10/2024 2:13 PM EDT  
  
  
[facility] for visit Narrative* Diagnostic Procedure Only (Routine) 
  - Authorized  
  
                          Specialty    Diagnoses / Procedures Referred By Contac  
t Referred To Contact  
   
                                                    [facility]                                 
  
  
Diagnoses  
  
  
Pregnancy with uncertain   
dates in first trimester  
  
  
Encounter for prenatal   
care in first trimester   
of first pregnancy  
  
  
7 weeks gestation of   
pregnancy  
  
  
  
Procedures  
  
  
NUCHAL TRANSLUCENCY WHI  
  
  
US FETAL NUCHAL   
TRANSLUCENCY 1ST   
GESTATION  
  
  
US FETAL NUCHAL   
TRANSLUCENCY EA ADDL   
GESTATION  
  
  
US PREGNANT UTERUS 14 WK   
TRANSABDL  GESTAT  
  
  
US PREG UTERUS 14 WK   
TRANSABDL EACH GESTATION  
  
  
OFFICE/OUTPATIENT   
ESTABLISHED LOW MDM 20   
MIN  
  
  
OFFICE/OUTPATIENT   
ESTABLISHED SF MDM 10   
MIN  
  
  
OB ANTEPARTUM CARE   
 DLVR &   
POSTPARTUM  
  
  
OB CARE ANTEPARTUM VAG   
DLVR & POSTPARTUM                         
  
  
Dilcia White APRN.CNP  
  
  
[address] [place] [address]  
  
  
Phone: [phone]  
  
  
Fax: [phone]                         
  
  
[facility]  
  
  
[address]  
  
  
[place] [address]  
  
  
  
                          Referral ID  Status       Reason       Start   
Date                                    Expiration   
Date                                    Visits   
Requested                               Visits   
Authorized  
   
                                [id number]        Authorized        
  
  
Auto-Generat  
ed Referral     2024      99              99  
  
  
  
[facility]  
  
Summary Purpose  
  
  
                                                      
  
  
  
Family History  
                              Unknown Family Member  
  
                                Name            Dates           Details  
   
                                                    Family history of coronary a  
rtery disease: Father(V17.3, Z82.49)  
                                                    Status:Active  
   
                                                    Family history of diabetes m  
ellitus: Mother(V18.0, Z83.3)  
                                                    Status:Active  
   
                                                    Family history of Alzheimer'  
s disease: Mother, Grandparent,   
Maternal Grandmother(V17.2, Z82.0)  
                                                    Status:Active  
  
                              Unknown Family Member  
  
                                Name            Dates           Details  
   
                                                    Family history of coronary a  
rtery disease: Father(V17.3, Z82.49)  
                                                    Status:Active  
   
                                                    Family history of diabetes m  
ellitus: Mother(V18.0, Z83.3)  
                                                    Status:Active  
   
                                                    Family history of Alzheimer'  
s disease: Mother, Grandparent,   
Maternal Grandmother(V17.2, Z82.0)  
                                                    Status:Active  
  
                              Unknown Family Member  
  
                                Name            Dates           Details  
   
                                                    Family history of coronary a  
rtery disease: Father(V17.3, Z82.49)  
                                                    Status:Active  
   
                                                    Family history of diabetes m  
ellitus: Mother(V18.0, Z83.3)  
                                                    Status:Active  
   
                                                    Family history of Alzheimer'  
s disease: Mother, Grandparent,   
Maternal Grandmother(V17.2, Z82.0)  
                                                    Status:Active  
  
  
  
Advance Directives  
                                Documents on File  
  
                          Type         Date Recorded Patient Representative Expl  
anation  
   
                          Advance Directives and Living Will                      
         
  
  
  
Reason for Referral  
  
  
                          Status       Reason       Specialty    Diagnoses /   
Procedures                              Referred By   
Contact                                 Referred To   
Contact  
   
                          New Request                              
  
  
Diagnoses  
  
  
Closed right ankle   
fracture, initial   
encounter  
  
  
  
Procedures  
  
  
XR ANKLE RIGHT 3+   
VIEWS                                     
  
  
Moises Harley MD  
  
  
[address] [place] [address]  
  
  
Phone:   
[phone]  
  
  
Fax: [phone]                         
  
  
  
  
  
                          Status       Reason       Specialty    Diagnoses /   
Procedures                              Referred By   
Contact                                 Referred To   
Contact  
   
                                New Request                     [facility]  
ramo   
& Sports Medicine /   
Physical Therapy                          
  
  
Diagnoses  
  
  
Closed right   
ankle fracture,   
initial encounter  
                                          
  
  
Moises Harley MD  
  
  
[address] [place]   
[address]  
  
  
Phone:   
[phone]  
  
  
Fax:   
[phone]                              
  
  
  
  
  
                                                    Scheduling Instructions  
   
                                                      
  
  
.  
  
  
  
                          Status       Reason       Specialty    Diagnoses /   
Procedures                              Referred By   
Contact                                 Referred To   
Contact  
   
                          New Request                              
  
  
Diagnoses  
  
  
Closed fracture of   
right ankle with   
routine healing,   
subsequent encounter  
  
  
  
Procedures  
  
  
XR ANKLE RIGHT 3+   
VIEWS                                     
  
  
Moises Harley MD  
  
  
[address] [place] [address]  
  
  
Phone:   
[phone]  
  
  
Fax: [phone]                         
  
  
  
  
  
                          Specialty    Diagnoses / Procedures Referred By Contac  
t Referred To Contact  
   
                                        Nutrition / OB/GYN    
  
  
Diagnoses  
  
  
Gestational diabetes   
mellitus (GDM) in third   
trimester, gestational   
diabetes method of   
control unspecified  
  
  
  
Procedures  
  
  
CONSULT TO NUTRITION   
THERAPY  
  
  
MEDICAL NUTRITION   
ASSMT&IVNTJ INDIV EACH   
15 MI                                     
  
  
Bobbi Jalloh APRN.CNP  
  
  
[address]  
  
  
[place] [address]  
  
  
Phone: [phone]  
  
  
Fax: [phone]                         
  
  
[facility]  
  
  
[address]  
  
  
[place] [address]  
  
  
Phone: [phone]  
  
  
  
                    Referral ID Status    Reason    Start Date Expiration Date V  
isits   
Requested                               Visits   
Authorized  
   
                                [id number]        Closed            
  
  
PCP Requested   
Referral        2024        1               4  
  
  
  
                          Specialty    Diagnoses / Procedures Referred By Contac  
t Referred To Contact  
   
                                        OB/GYN                
  
  
Diagnoses  
  
  
Gestational diabetes   
mellitus (GDM) in third   
trimester, gestational   
diabetes method of control   
unspecified  
  
  
  
Procedures  
  
  
CONSULT TO DIABETES   
EDUCATION DSME/MNT  
  
  
MEDICAL NUTRITION   
ASSMT&IVNTJ INDIV EACH 15   
MI  
  
  
MEDICAL NUTRITION   
ASSMT&IVNTJ INDIV EACH 15   
MI  
  
  
MEDICAL NUTRITION   
ASSMT&IVNTJ INDIV EACH 15   
MI  
  
  
MEDICAL NUTRITION   
ASSMT&IVNTJ INDIV EACH 15   
MI                                        
  
  
Bobbi Jalloh APRN.CNP  
  
  
[address]  
  
  
[place] [address]  
  
  
Phone: [phone]  
  
  
Fax: [phone]                         
  
  
[facility]  
  
  
[address]  
  
  
[place] [address]  
  
  
Phone: [phone]  
  
  
  
                          Referral ID  Status       Reason       Start   
Date                                    Expiration   
Date                                    Visits   
Requested                               Visits   
Authorized  
   
                                [id number]        Authorized        
  
  
PCP Requested   
Referral        2024      1               1  
  
  
  
                          Specialty    Diagnoses / Procedures Referred By Contac  
t Referred To Contact  
   
                                                    [facility]                                 
  
  
Diagnoses  
  
  
Gestational diabetes   
mellitus (GDM) in third   
trimester, gestational   
diabetes method of   
control unspecified  
  
  
  
Procedures  
  
  
OBSTETRIC ULTRASOUND   
WHI  
  
  
US PREG UTERUS AFTER   
1ST TRIMEST    
GESTATION                                 
  
  
Bobbi Jalloh APRN.CNP  
  
  
[address] [place] [address]  
  
  
Phone: [phone]  
  
  
Fax: [phone]                         
  
  
[facility]  
  
  
[address]  
  
  
[place] [address]  
  
  
  
                          Referral ID  Status       Reason       Start   
Date                                    Expiration   
Date                                    Visits   
Requested                               Visits   
Authorized  
   
                                        [id number]            Pending   
Review                                    
  
  
Auto-Generat  
ed Referral     2024        5               1  
  
  
  
Assessments  
  
  
                                                    Diagnosis  
   
                                                      
  
  
Closed right ankle fracture, initial encounter  
  
  
  
                                                    Diagnosis  
   
                                                      
  
  
Closed right ankle fracture, initial encounter  
  
  
  
                                                    Diagnosis  
   
                                                      
  
  
Closed right ankle fracture, initial encounter  
  
  
  
                                                    Diagnosis  
   
                                                      
  
  
Closed right ankle fracture, initial encounter  
  
  
  
                                                    Diagnosis  
   
                                                      
  
  
Closed fracture of right ankle with routine healing, subsequent encounter  
  
  
  
                                                    Diagnosis  
   
                                                      
  
  
Right ankle pain, unspecified chronicity- Primary  
   
                                                      
  
  
Closed right ankle fracture, initial encounter  
  
  
  
History of Present Illness  
* Moises Harley MD - 02/10/2020 9:00 AM EST  
  
Formatting of this note might be different from the original.  
02/10/20  
  
HPI:  
30-year old now 3 weeks status post a right lateral malleolus fracture. At this 
point she is havingpain, difficulty and feelings of swelling in the ankle and 
calf area.  
  
Social History: She reports that she has never smoked. She has never used 
smokeless tobacco. She reports that she does not drink alcohol or use drugs.  
  
Review of Systems  
Constitutional: Negative for chills and fever.  
Eyes: Negative for visual disturbance.  
Respiratory: Negative for shortness of breath.  
Cardiovascular: Negative for chest pain.  
Gastrointestinal: Negative for abdominal pain and blood in stool.  
Genitourinary: Negative for hematuria.  
Musculoskeletal: Positive for myalgias.  
Neurological: Negative for seizures.  
Hematological: Does not bruise/bleed easily.  
Psychiatric/Behavioral: Negative for dysphoric mood.  
  
No hx of DVT or CA  
  
Physical Exam:  
Visit Vitals  
Temp 98.7 F (37.1 C)  
Ht 1.676 m (5' 6 )  
Wt 77.1 kg (169 lb 15.6 oz)  
BMI 27.43 kg/m  
  
Patient is alert, oriented and cooperative with exam.  
On examination she has tenderness only laterally. She is nontender medially. She
 has dorsiflexion and plantar flexion without gross difficulty. She does have 
swelling and ecchymosis throughout the leg.  
  
Diagnostic Studies:  
Radiographs today show the mortise to be anatomic. The fracture is in good 
alignment.  
  
Assessment:  
1. Healing right lateral malleolus fracture with swelling symptoms.  
  
Plan:  
I have told her ice and elevation, work with gentle range of motion of the ankle
 actively. Toe-touch weight bearing is fine. Will see her back with me at her 
scheduled visit with 3 views of the rightankle out of the boot. At that time I 
will start physical therapy.  
  
  
  
Electronically signed by Moises Harley MD at 02/10/2020 12:59 PM EST  
  
  
* Carolina Norma - 02/10/2020 9:00 AM EST  
  
Review of Systems  
Constitutional: Negative for chills and fever.  
Eyes: Negative for visual disturbance.  
Respiratory: Negative for shortness of breath.  
Cardiovascular: Negative for chest pain.  
Gastrointestinal: Negative for abdominal pain and blood in stool.  
Genitourinary: Negative for hematuria.  
Musculoskeletal: Positive for myalgias.  
Neurological: Negative for seizures.  
Hematological: Does not bruise/bleed easily.  
Psychiatric/Behavioral: Negative for dysphoric mood.  
  
No hx of DVT or CA  
  
  
  
Electronically signed by Norma Figueroa at 02/10/2020 12:59 PM EST  
  
  
documented in this encounter* Moises Harley MD - 2020 10:15 AM EST  
  
Formatting of this note might be different from the original.  
20  
  
HPI:  
30-year old now 6 weeks status post right lateral malleolus fracture. She is 
doing fairly well withsome aching and pain.  
  
Social History: She reports that she has never smoked. She has never used 
smokeless tobacco. She reports that she does not drink alcohol or use drugs.  
  
Review of Systems  
Constitutional: Negative for chills and fever.  
Eyes: Negative for visual disturbance.  
Respiratory: Negative for shortness of breath.  
Cardiovascular: Negative for chest pain.  
Gastrointestinal: Negative for abdominal pain and blood in stool.  
Genitourinary: Negative for hematuria.  
Musculoskeletal: Positive for myalgias.  
Neurological: Negative for seizures.  
Hematological: Does not bruise/bleed easily.  
Psychiatric/Behavioral: Negative for dysphoric mood.  
  
Physical Exam:  
Visit Vitals  
Temp 99.5 F (37.5 C) (Temporal)  
Ht 1.676 m (5' 6 )  
Wt 77.1 kg (169 lb 15.6 oz)  
BMI 27.43 kg/m  
  
Patient is alert, oriented and cooperative with exam.  
Examination today shows her to have dorsiflexion of 5, plantar flexion of 15, 
mild pain to palpation, no gross swelling.  
  
Diagnostic Studies:  
3 views of the ankle show the fracture healing in good position.  
  
Assessment:  
1. Healing right lateral malleolus fracture.  
  
Plan:  
At this point, she can wean herself from the boot. Physical therapy for range of
 motion and strengthening. Progressive weight bearing as tolerated. Will see her
 back with me in 4 weeks' time. I continued her work restrictions for at least 6
 weeks.  
  
  
  
Electronically signed by Moises Harley MD at 2020 4:18 PM EST  
  
  
* Kirsty Walker LPN - 2020 10:15 AM EST  
  
Formatting of this note might be different from the original.  
Subjective  
  
Chief Complaint  
Patient presents with  
Right Ankle - Pain, Fracture  
  
  
Review of Systems  
Constitutional: Negative for chills and fever.  
Eyes: Negative for visual disturbance.  
Respiratory: Negative for shortness of breath.  
Cardiovascular: Negative for chest pain.  
Gastrointestinal: Negative for abdominal pain and blood in stool.  
Genitourinary: Negative for hematuria.  
Musculoskeletal: Positive for myalgias.  
Neurological: Negative for seizures.  
Hematological: Does not bruise/bleed easily.  
Psychiatric/Behavioral: Negative for dysphoric mood.  
  
  
  
  
  
Electronically signed by Kirsty Walker LPN at 2020 4:18 PM EST  
  
  
documented in this encounter* Moises Harley MD - 2020 8:45 AM EDT  
  
Formatting of this note might be different from the original.  
20  
  
HPI:  
30-year old female who is now 10 weeks status post right lateral malleolus 
fracture. At this point she is still in the boot some, weight bearing as 
tolerated, making some progress. It hurts after shehas been up on it all day.  
  
Social History: She reports that she has never smoked. She has never used 
smokeless tobacco. She reports that she does not drink alcohol or use drugs.  
  
Review of Systems  
Constitutional: Negative for chills and fever.  
Eyes: Negative for visual disturbance.  
Respiratory: Negative for shortness of breath.  
Cardiovascular: Negative for chest pain.  
Gastrointestinal: Negative for abdominal pain and blood in stool.  
Genitourinary: Negative for hematuria.  
Musculoskeletal: Positive for myalgias.  
Neurological: Negative for seizures.  
Hematological: Does not bruise/bleed easily.  
Psychiatric/Behavioral: Negative for dysphoric mood.  
  
Physical Exam:  
Visit Vitals  
Temp 98.4 F (36.9 C) (Temporal)  
Ht 1.676 m (5' 6 )  
Wt 77.1 kg (169 lb 15.6 oz)  
BMI 27.43 kg/m  
  
Patient is alert, oriented and cooperative with exam.  
Examination today shows dorsiflexion 10, plantar flexion 15. She has less pain 
to palpation laterally. She has no effusion.  
  
Diagnostic Studies:  
3 views of the right ankle show the lateral malleolus healing well. Mortise is 
anatomic. She does have some disuse osteopenia.  
  
Assessment:  
1. Healing right lateral malleolus fracture.  
  
Plan:  
At this point she really cannot go back to running activities yet. I have given 
her 6 months off running. Otherwise she can return to activities as tolerated. I
 don't think we need to see her back inthe office unless she is having further 
problems. Will see her back with me on a prn basis.  
  
  
  
Electronically signed by Moises Harley MD at 2020 10:15 AM EDT  
  
  
* Kirsty Walker LPN - 2020 8:45 AM EDT  
  
Formatting of this note might be different from the original.  
Subjective  
  
Chief Complaint  
Patient presents with  
Right Ankle - Follow-up, Fracture  
  
Review of Systems  
Constitutional: Negative for chills and fever.  
Eyes: Negative for visual disturbance.  
Respiratory: Negative for shortness of breath.  
Cardiovascular: Negative for chest pain.  
Gastrointestinal: Negative for abdominal pain and blood in stool.  
Genitourinary: Negative for hematuria.  
Musculoskeletal: Positive for myalgias.  
Neurological: Negative for seizures.  
Hematological: Does not bruise/bleed easily.  
Psychiatric/Behavioral: Negative for dysphoric mood.  
  
  
  
  
  
Electronically signed by Kirsty Walker LPN at 2020 10:16 AM EDT  
  
  
documented in this encounter* Moises Harley MD - 2020 9:15 AM EST  
  
Formatting of this note might be different from the original.  
20  
  
HPI:  
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 At this 
point she was supposedto deploy, but slipped on the ice the other day twisting 
her right ankle. She is complaining of lateral ankle pain.  
  
Past Medical History:  
Diagnosis Date  
Known health problems: none  
  
Past Surgical History:  
Procedure Laterality Date  
COLONOSCOPY W/ BX   
WISDOM TEETH EXTRACTION   
TONSILLECTOMY ADENOIDECTOMY   
  
Current Outpatient Medications  
Medication Sig Dispense Refill  
fexofenadine (ALLEGRA ALLERGY) 60 MG Tab Take by mouth.  
fluticasone 50 MCG/ACT Suspension nasal spray INSTILL 2 SPRAYS EACH NARES QD PRN
 FOR RELIEF OF ALLERGY SYMPTOMS .  
Omega-3 Fatty Acids (FISH OIL) 1000 MG Cap DR Take by mouth.  
oxyCODONE-acetaminophen 5-325 MG Tab per tablet  
  
No current facility-administered medications for this visit.  
  
Social History: Her reports that she has never smoked. She has never used 
smokeless tobacco. She reports that she does not drink alcohol or use drugs.  
  
Review of Systems  
Constitutional: Positive for chills. Negative for fever.  
Eyes: Negative for visual disturbance.  
Respiratory: Negative for shortness of breath.  
Cardiovascular: Negative for chest pain.  
Gastrointestinal: Negative for abdominal pain and blood in stool.  
Genitourinary: Negative for hematuria.  
Musculoskeletal: Positive for myalgias.  
Neurological: Negative for seizures.  
Hematological: Does not bruise/bleed easily.  
Psychiatric/Behavioral: Negative for dysphoric mood.  
  
Physical Exam:  
Visit Vitals  
Temp 97.6 F (36.4 C) (Temporal)  
Ht 1.676 m (5' 6 )  
Wt 77.1 kg (170 lb)  
BMI 27.44 kg/m  
  
The patient is well-developed and well-nourished.  
Alert and oriented and cooperative with the exam.  
The patient is normocephalic with no apparent facial muscle weakness or 
asymmetry  
No acute respiratory distress with normal respiratory effort during the exam  
On examination she has moderate swelling, ecchymosis and pain laterally, 
nontender medially.  
  
Diagnostic Studies:  
Radiographs today including x-rays from before show the mortise to be anatomic. 
She has a nondisplaced lateral malleolus fracture.  
  
Assessment:  
1. Right lateral malleolus fracture SER-2.  
  
Plan:  
At this point she was placed in a boot, protected weight bearing. Ice and 
elevation. She can come out of the boot for bathing. Gentle range of motion. 
Will see her back in 6 weeks' time with 3 views of the right ankle.  
  
  
  
  
  
Electronically signed by Moises Harley MD at 2020 1:27 PM EST  
  
  
* Kirsty Walker LPN - 2020 9:15 AM EST  
  
Formatting of this note might be different from the original.  
Subjective  
  
Chief Complaint  
Patient presents with  
Right Lower Leg - New Patient, Injury, Fracture  
RLE- fell on   
  
Review of Systems  
Constitutional: Positive for chills. Negative for fever.  
Eyes: Negative for visual disturbance.  
Respiratory: Negative for shortness of breath.  
Cardiovascular: Negative for chest pain.  
Gastrointestinal: Negative for abdominal pain and blood in stool.  
Genitourinary: Negative for hematuria.  
Musculoskeletal: Positive for myalgias.  
Neurological: Negative for seizures.  
Hematological: Does not bruise/bleed easily.  
Psychiatric/Behavioral: Negative for dysphoric mood.  
  
  
  
  
  
Electronically signed by Kirsty Walker LPN at 2020 1:28 PM EST  
  
  
documented in this encounter  
  
Chief Complaint  
ISSUES WITH ACID REFLUX, HEART BURN, NAUSEA. TRIED PEPCID 20MG , TUMS, ROLAIDS.1
 MO F/U REV LABS  
  
Health Concerns  
  
  
                                Problem         Noted Date      Diagnosed Date  
   
                                [facility] Pregnancy Education - [facility] 2024    
      
   
                                Pregnancy Education - [place] 2024        
  
  
  
                                Problem         Noted Date      Diagnosed Date  
   
                                [facility] Pregnancy [facility] - [facility] 2024    
      
   
                                Pregnancy Education - [place] 2024        
  
  
  
                                Problem         Noted Date      Diagnosed Date  
   
                                [facility] Pregnancy [facility] - [facility] 2024    
      
   
                                Pregnancy Education - [place] 2024        
  
  
  
                                Active Problems Noted Date      Diagnosed Date  
   
                                [facility] Pregnancy [facility] - [facility] 2024    
      
   
                                [facility]regnancy Education - [place] 2024        
  
  
  
                                Active Problems Noted Date      Diagnosed Date  
   
                                [facility]regnancy [facility] - [facility] 2024    
      
   
                                [facility]regnancy Education - [place] 2024        
  
  
  
                                Active Problems Noted Date      Diagnosed Date  
   
                                [facility] Pregnancy [facility] - [facility] 2024    
      
   
                                Pregnancy Education - [place] 2024        
  
  
  
Additional Source Comments  
  
  
  
                                                    INFORMATION SOURCE (unrecogn  
ized section and content)  
   
                                          
  
  
  
                                        DATE CREATED        AUTHOR  
   
                                2018                      [facility]  
  
  
  
                                DATE CREATED    AUTHOR          AUTHOR'S ORGANIZ  
ATION  
   
                                2020                      [facility]  
  
  
  
                                DATE CREATED    AUTHOR          AUTHOR'S ORGANIZ  
ATION  
   
                                2021                      [facility]  
  
  
  
                                DATE CREATED    AUTHOR          AUTHOR'S ORGANIZ  
ATION  
   
                                2021                      [facility]  
spital  
  
  
  
                                DATE CREATED    AUTHOR          AUTHOR'S ORGANIZ  
ATION  
   
                                2023                      [facility]  
  
  
  
                                DATE CREATED    AUTHOR          AUTHOR'S ORGANIZ  
ATION  
   
                                2023                       [facility]  
  
  
  
                                DATE CREATED    AUTHOR          AUTHOR'S ORGANIZ  
ATION  
   
                                2024                      [facility]  
  
  
  
                                DATE CREATED    AUTHOR          AUTHOR'S ORGANIZ  
ATION  
   
                                2024                      [facility]  
  
  
  
                                DATE CREATED    AUTHOR          AUTHOR'S ORGANIZ  
ATION  
   
                                2024                      [facility]  
  
  
  
  
  
                                                    Reason for Visit (unrecogniz  
ed section and content)  
   
                                          
  
  
  
                          Status       Reason       Specialty    Diagnoses /   
Procedures                              Referred By   
Contact                                 Referred To   
Contact  
   
                          New Request                              
  
  
Diagnoses  
  
  
Closed right ankle   
fracture, initial   
encounter  
  
  
  
Procedures  
  
  
XR ANKLE RIGHT 3+   
VIEWS                                     
  
  
Moises Harley MD  
  
  
[address] [id number]  
  
  
Phone:   
[phone]  
  
  
Fax: [phone]                         
  
  
  
  
  
                                        Reason              Comments  
   
                                        Follow-up             
  
  
  
                                        Reason              Comments  
   
                                        Pain                  
   
                                        Fracture              
  
  
  
                          Status       Reason       Specialty    Diagnoses /   
Procedures                              Referred By   
Contact                                 Referred To   
Contact  
   
                          New Request                              
  
  
Diagnoses  
  
  
Closed fracture of   
right ankle with   
routine healing,   
subsequent encounter  
  
  
  
Procedures  
  
  
XR ANKLE RIGHT 3+   
VIEWS                                     
  
  
Moises Harley MD  
  
  
[address] [place] [address]  
  
  
Phone:   
[phone]  
  
  
Fax: [phone]                         
  
  
  
  
  
                                        Reason              Comments  
   
                                        Follow-up             
   
                                        Fracture              
  
  
  
                                        Reason              Comments  
   
                                        New Patient         RLE- fell on   
20  
   
                                        Injury              RLE- fell on   
20  
   
                                        Fracture            RLE- fell on   
20  
  
  
  
                                        Reason              Comments  
   
                                        Follow-up           5 wk f/u Right ankle  
 varus, instability, pain of  
  
  
  
                                        Reason              Comments  
   
                                        Yearly Exam           
  
  
  
                                        Reason              Comments  
   
                                        Annual Exam         Wellness  
  
  
  
                                Reason          Onset Date      Comments  
   
                                Prenatal Care   2024        
  
  
  
                                Reason          Onset Date      Comments  
   
                                Prenatal Care                     
   
                                Prenatal Care   2024        
  
  
  
                                Reason          Onset Date      Comments  
   
                                Prenatal Care   2024        
  
  
  
                                        Reason              Comments  
   
                                        Pregnancy US          
  
  
  
                          Specialty    Diagnoses / Procedures Referred By Contac  
t Referred To Contact  
   
                                                    [facility]                                 
  
  
Diagnoses  
  
  
13 weeks gestation of   
pregnancy  
  
  
  
Procedures  
  
  
OBSTETRIC ULTRASOUND   
[facility]  
  
  
US PREG UTERUS AFTER   
1ST TRIMEST    
GESTATION                                 
  
  
Shayna Escalera MD  
  
  
[address] [place] [address]  
  
  
Phone: [phone]  
  
  
Fax: [phone]                         
  
  
[facility]  
  
  
[address] [place] [address]  
  
  
  
                          Referral ID  Status       Reason       Start   
Date                                    Expiration   
Date                                    Visits   
Requested                               Visits   
Authorized  
   
                                [id number]        Authorized        
  
  
Auto-Generat  
ed Referral     2024      20              20  
  
  
  
                                Reason          Onset Date      Comments  
   
                                Prenatal Care   2024        
  
  
  
                                Reason          Onset Date      Comments  
   
                                Prenatal Care   2024        
  
  
  
                                        Reason              Comments  
   
                                        Results               
  
  
  
                                        Reason              Comments  
   
                                        Results               
  
  
  
                                        Reason              Comments  
   
                                        Patient Education     
   
                                        Assessment            
  
  
  
                          Specialty    Diagnoses / Procedures Referred By Contac  
t Referred To Contact  
   
                                        Nutrition / OB/GYN    
  
  
Diagnoses  
  
  
Gestational diabetes   
mellitus (GDM) in third   
trimester, gestational   
diabetes method of   
control unspecified  
  
  
  
Procedures  
  
  
CONSULT TO NUTRITION   
THERAPY  
  
  
MEDICAL NUTRITION   
ASSMT&IVNTJ INDIV EACH   
15 MI                                     
  
  
Haury, Bobbi,   
APRN.CNP  
  
  
[address] [place] [address]  
  
  
Phone: [phone]  
  
  
Fax: [phone]                         
  
  
[facility]  
  
  
[address]  
  
  
[place] [address]  
  
  
Phone: [phone]  
  
  
  
                    Referral ID Status    Reason    Start Date Expiration Date V  
isits   
Requested                               Visits   
Authorized  
   
                                [id number]        Closed            
  
  
PCP Requested   
Referral        2024        1               4  
  
  
  
                                        Reason              Comments  
   
                                        Gestational / Rn Visit Diabetes   
  
  
  
                          Specialty    Diagnoses / Procedures Referred By Contac  
t Referred To Contact  
   
                                        OB/GYN                
  
  
Diagnoses  
  
  
Gestational diabetes   
mellitus (GDM) in third   
trimester, gestational   
diabetes method of control   
unspecified  
  
  
  
Procedures  
  
  
CONSULT TO DIABETES   
EDUCATION DSME/MNT  
  
  
MEDICAL NUTRITION   
ASSMT&IVNTJ INDIV EACH 15   
MI  
  
  
MEDICAL NUTRITION   
ASSMT&IVNTJ INDIV EACH 15   
MI  
  
  
MEDICAL NUTRITION   
ASSMT&IVNTJ INDIV EACH 15   
MI  
  
  
MEDICAL NUTRITION   
ASSMT&IVNTJ INDIV EACH 15   
MI                                        
  
  
Bobbi Jalloh, APRN.CNP  
  
  
[address] [place] [address]  
  
  
Phone: [phone]  
  
  
Fax: [phone]                         
  
  
[facility]  
  
  
[address]  
  
  
[place] [address]  
  
  
Phone: [phone]  
  
  
  
                    Referral ID Status    Reason    Start Date Expiration Date V  
isits   
Requested                               Visits   
Authorized  
   
                                [id number]        Closed            
  
  
PCP Requested   
Referral        2024      1               1  
  
  
  
                                        Reason              Comments  
   
                                        FMLA Paperwork        
  
  
  
                                Reason          Onset Date      Comments  
   
                                Prenatal Care   06/10/2024        
  
  
  
                                Reason          Onset Date      Comments  
   
                                Refill Request  2024        
  
  
  
                                Reason          Onset Date      Comments  
   
                                Prenatal Care   2024        
  
  
  
                                        Reason              Comments  
   
                                        RX Request - Dexcom   
  
  
  
                          Specialty    Diagnoses / Procedures Referred By Contac  
t Referred To Contact  
   
                                                    [facility]                                 
  
  
Diagnoses  
  
  
28 weeks gestation of   
pregnancy  
  
  
Obesity affecting   
pregnancy in second   
trimester, unspecified   
obesity type  
  
  
  
Procedures  
  
  
OBSTETRIC ULTRASOUND   
[facility]I  
  
  
US PREG UTERUS AFTER   
1ST TRIMEST    
GESTATION                                 
  
  
Letty Beaver,   
APRN.CNM  
  
  
[address] [place] [address]  
  
  
Phone: [phone]  
  
  
Fax: [phone]                         
  
  
[facility]  
  
  
[address] [place] [address]  
  
  
  
                          Referral ID  Status       Reason       Start   
Date                                    Expiration   
Date                                    Visits   
Requested                               Visits   
Authorized  
   
                                [id number]        Authorized        
  
  
Auto-Generat  
ed Referral     2024       2024      20              20  
  
  
  
                                        Reason              Comments  
   
                                        Orders                
  
  
  
                                        Reason              Comments  
   
                                        Blood management      
  
  
  
                                Reason          Onset Date      Comments  
   
                                Prenatal Care   2024        
  
  
  
                                Reason          Onset Date      Comments  
   
                                Prenatal Care   2024        
  
  
  
                                Reason          Onset Date      Comments  
   
                                Prenatal Care   2024        
  
  
  
                                Reason          Onset Date      Comments  
   
                                Prenatal Care   2024        
  
  
  
                                        Reason              Comments  
   
                                        Benefits Investigation   
  
  
  
                                        Reason              Comments  
   
                                        Non-Chemotherapy Treatment   
  
  
  
                          Specialty    Diagnoses / Procedures Referred By Contac  
t Referred To Contact  
   
                                                              
  
  
Diagnoses  
  
  
Impaired intestinal   
absorption  
  
  
Maternal iron deficiency   
anemia complicating   
pregnancy, third trimester  
  
  
  
Procedures  
  
  
IRON SUCROSE INJECTION PER 1   
MG                                        
  
  
Shayna Escalera MD  
  
  
[address]  
  
  
[place] [address]  
  
  
Phone: [phone]  
  
  
Fax: [phone]                         
  
  
[facility]  
  
  
[address]  
  
  
[place] [address]  
  
  
Phone: [phone]  
  
  
Fax: [phone]  
  
  
  
                    Referral ID Status    Reason    Start Date Expiration Date V  
isits   
Requested                               Visits   
Authorized  
   
                                        [id number]            Waiting for   
Response                  2024    1            1  
  
  
  
                                Reason          Onset Date      Comments  
   
                                Prenatal Care   2024        
  
  
  
                                Reason          Onset Date      Comments  
   
                                Prenatal Care   07/15/2024        
  
  
  
                                        Reason              Comments  
   
                                        Future Appointment    
  
  
  
                    Referral ID Status    Reason    Start Date Expiration Date V  
isits   
Requested                               Visits   
Authorized  
   
                [id number] Authorized         2024 3       3  
  
  
  
                                Reason          Onset Date      Comments  
   
                                Prenatal Care   2024        
  
  
  
                                Reason          Onset Date      Comments  
   
                                Prenatal Care   2024        
  
  
  
                          Specialty    Diagnoses / Procedures Referred By Contac  
t Referred To Contact  
   
                                                    [facility]                                 
  
  
Diagnoses  
  
  
Encounter for   
supervision of normal   
first pregnancy in   
second trimester  
  
  
17 weeks gestation of   
pregnancy  
  
  
  
Procedures  
  
  
OBSTETRIC ULTRASOUND   
[facility]I  
  
  
US PREG UTERUS AFTER   
1ST TRIMEST    
GESTATION                                 
  
  
Bobbi Jalloh,   
APRN.CNP  
  
  
[address]  
  
  
[place] [address]  
  
  
Phone: [phone]  
  
  
Fax: [phone]                         
  
  
[facility]  
  
  
[address] [place] [address]  
  
  
  
                          Referral ID  Status       Reason       Start   
Date                                    Expiration   
Date                                    Visits   
Requested                               Visits   
Authorized  
   
                                [id number]        Authorized        
  
  
Auto-Generat  
ed Referral     6/10/2024       2024      20              20  
  
  
  
                                Reason          Onset Date      Comments  
   
                                Prenatal Care   2024        
  
  
  
                                        Reason              Comments  
   
                                        Induction             
  
  
  
  
  
                                                    Care Teams (unrecognized sec  
tion and content)  
   
                                          
  
  
  
                      Team Member Relationship Specialty  Start Date End Date  
   
                                                      
  
  
Tim Carmichael MD  
  
  
 [place] [address]  
  
  
[phone] (Work)  
  
  
[phone] (Fax) PCP - General   Family Medicine 20           
  
  
  
                      Team Member Relationship Specialty  Start Date End Date  
   
                                                      
  
  
Tim Carmichael PCP - General   Family Medicine 3/14/16           
  
  
  
                      Team Member Relationship Specialty  Start Date End Date  
   
                                                      
  
  
Tim Carmichael MD  
  
  
NPI: [id number]  
  
  
 [place] [address]  
  
  
[phone] (Work)  
  
  
[phone] (Fax) PCP - General                   20           
  
  
  
                      Team Member Relationship Specialty  Start Date End Date  
   
                                                      
  
  
Tim Carmichael MD  
  
  
NPI: [id number]  
  
  
[phone] (Work)  
  
  
[phone] (Fax) PCP - General   Family Medicine 3/14/16           
  
  
  
                      Team Member Relationship Specialty  Start Date End Date  
   
                                                      
  
  
Tim Carmichael MD  
  
  
NPI: [id number]  
  
  
[phone] (Work)  
  
  
[phone] (Fax) PCP - General   Family Medicine 3/14/16           
  
  
  
                      Team Member Relationship Specialty  Start Date End Date  
   
                                                      
  
  
Tim Carmichael MD  
  
  
NPI: [id number]  
  
  
[phone] (Work)  
  
  
[phone] (Fax) PCP - General   Family Medicine 3/14/16           
  
  
  
                      Team Member Relationship Specialty  Start Date End Date  
   
                                                      
  
  
Tim Carmichale MD  
  
  
NPI: [id number]  
  
  
[phone] (Work)  
  
  
[phone] (Fax) PCP - General   Family Medicine 3/14/16           
  
  
  
                      Team Member Relationship Specialty  Start Date End Date  
   
                                                      
  
  
Tim Carmichael MD  
  
  
NPI: [id number]  
  
  
[phone] (Work)  
  
  
[phone] (Fax) PCP - General   Family Medicine 3/14/16           
  
  
  
                      Team Member Relationship Specialty  Start Date End Date  
   
                                                      
  
  
Tim Carmichael MD  
  
  
NPI: [id number]  
  
  
[phone] (Work)  
  
  
[phone] (Fax) PCP - General   Family Medicine 3/14/16           
  
  
  
                      Team Member Relationship Specialty  Start Date End Date  
   
                                                      
  
  
Tim Carmichael MD  
  
  
NPI: [id number]  
  
  
[phone] (Work)  
  
  
[phone] (Fax) PCP - General   Family Medicine 3/14/16           
  
  
  
                      Team Member Relationship Specialty  Start Date End Date  
   
                                                      
  
  
Tim Carmichael MD  
  
  
NPI: [id number]  
  
  
[phone] (Work)  
  
  
[phone] (Fax) PCP - General   Family Medicine 3/14/16           
  
  
  
                      Team Member Relationship Specialty  Start Date End Date  
   
                                                      
  
  
Tim Carmichael MD  
  
  
NPI: [id number]  
  
  
[phone] (Work)  
  
  
[phone] (Fax) PCP - General   Family Medicine 3/14/16           
  
  
  
                      Team Member Relationship Specialty  Start Date End Date  
   
                                                      
  
  
Tim Carmichael MD  
  
  
NPI: [id number]  
  
  
[phone] (Work)  
  
  
[phone] (Fax) PCP - General   Family Medicine 3/14/16           
  
  
  
                      Team Member Relationship Specialty  Start Date End Date  
   
                                                      
  
  
Tim Carmichael MD  
  
  
NPI: [id number]  
  
  
[phone] (Work)  
  
  
[phone] (Fax) PCP - General   Family [facility] 3/14/16           
   
                                                      
  
  
[address]  
  
  
NPI: [id number]  
  
  
[address] [address]  
  
  
[phone] (Work)  
  
  
[phone] (Fax) Registered Dietitian Nutrition       24            
  
  
  
                      Team Member Relationship Specialty  Start Date End Date  
   
                                                      
  
  
Tim Carmichael MD  
  
  
NPI: [id number]  
  
  
[phone] (Work)  
  
  
[phone] (Fax) PCP - General   [facility] 3/14/16           
   
                                                      
  
  
[address]  
  
  
NPI: [id number]  
  
  
[address] [address]  
  
  
[phone] (Work)  
  
  
[phone] (Fax) Registered Dietitian Nutrition       24            
  
  
  
                      Team Member Relationship Specialty  Start Date End Date  
   
                                                      
  
  
Tim Carmichael MD  
  
  
NPI: [id number]  
  
  
[phone] (Work)  
  
  
[phone] (Fax) PCP - General   [facility] 3/14/16           
   
                                                      
  
  
[address]  
  
  
NPI: [id number]  
  
  
[facility]  
  
  
[address] [place] [address]  
  
  
[phone] (Work)  
  
  
[phone] (Fax) Registered Dietitian Nutrition       24            
  
  
  
                      Team Member Relationship Specialty  Start Date End Date  
   
                                                      
  
  
Tim Carmichael MD  
  
  
NPI: [id number]  
  
  
[phone] (Work)  
  
  
[phone] (Fax) PCP - General   Family Medicine 3/14/16           
   
                                                      
  
  
[address]  
  
  
NPI: [id number]  
  
  
[address] [address]  
  
  
[phone] (Work)  
  
  
[phone] (Fax) Registered Dietitian Nutrition       24            
  
  
  
                      Team Member Relationship Specialty  Start Date End Date  
   
                                                      
  
  
Tim Carmichael MD  
  
  
NPI: [id number]  
  
  
[phone] (Work)  
  
  
[phone] (Fax) PCP - General   Family Medicine 3/14/16           
   
                                                      
  
  
[address]  
  
  
NPI: [id number]  
  
  
[address] [address]  
  
  
[phone] (Work)  
  
  
[phone] (Fax) Registered Dietitian Nutrition       24            
  
  
  
                      Team Member Relationship Specialty  Start Date End Date  
   
                                                      
  
  
Tim Carmichael MD  
  
  
NPI: [id number]  
  
  
[phone] (Work)  
  
  
[phone] (Fax) PCP - General   Family Medicine 3/14/16           
   
                                                      
  
  
[address]  
  
  
NPI: [id number]  
  
  
[address] [address]  
  
  
[phone] (Work)  
  
  
[phone] (Fax) Registered Dietitian Nutrition       24            
  
  
  
                      Team Member Relationship Specialty  Start Date End Date  
   
                                                      
  
  
Tim Carmichael MD  
  
  
NPI: [id number]  
  
  
[phone] (Work)  
  
  
[phone] (Fax) PCP - General   Family Medicine 3/14/16           
   
                                                      
  
  
[address]ely Tony [address]  
  
  
NPI: [id number]  
  
  
[address] [address]  
  
  
[phone] (Work)  
  
  
[phone] (Fax) Registered Dietitian Nutrition       24            
  
  
  
                      Team Member Relationship Specialty  Start Date End Date  
   
                                                      
  
  
Tim Carmichael MD  
  
  
NPI: [id number]  
  
  
[phone] (Work)  
  
  
[phone] (Fax) PCP - General   Family Medicine 3/14/16           
   
                                                      
  
  
[address]ely Tony [address]  
  
  
NPI: [id number]  
  
  
[address] [address]  
  
  
[phone] (Work)  
  
  
[phone] (Fax) Registered Dietitian Nutrition       24            
  
  
  
                      Team Member Relationship Specialty  Start Date End Date  
   
                                                      
  
  
Tim Carmichael MD  
  
  
NPI: [id number]  
  
  
[phone] (Work)  
  
  
[phone] (Fax) PCP - General   Family Medicine 3/14/16           
   
                                                      
  
  
[address]ely Tony [address]  
  
  
NPI: [id number]  
  
  
[address] [address]  
  
  
[phone] (Work)  
  
  
[phone] (Fax) Registered Dietitian Nutrition       24            
  
  
  
                      Team Member Relationship Specialty  Start Date End Date  
   
                                                      
  
  
Tim Carmichael MD  
  
  
NPI: [id number]  
  
  
[phone] (Work)  
  
  
[phone] (Fax) PCP - [facility] 3/14/16           
   
                                                      
  
  
[address]ely Tony RD  
  
  
NPI: [id number]  
  
  
[address] [address]  
  
  
[phone] (Work)  
  
  
[phone] (Fax) Registered Dietitian Nutrition       24            
  
  
  
                      Team Member Relationship Specialty  Start Date End Date  
   
                                                      
  
  
Tim Carmichael MD  
  
  
NPI: [id number]  
  
  
[phone] (Work)  
  
  
[phone] (Fax) PCP - General   Family Medicine 3/14/16           
   
                                                      
  
  
[address]ely Tony [address]  
  
  
NPI: [id number]  
  
  
[address] [address]  
  
  
[phone] (Work)  
  
  
[phone] (Fax) Registered Dietitian Nutrition       24            
  
  
  
                      Team Member Relationship Specialty  Start Date End Date  
   
                                                      
  
  
Tmi Carmichael MD  
  
  
NPI: [id number]  
  
  
[phone] (Work)  
  
  
[phone] (Fax) PCP - [facility]   Family Medicine 3/14/16           
   
                                                      
  
  
[address]ely Tony [address]  
  
  
NPI: [id number]  
  
  
[address] [address]  
  
  
[phone] (Work)  
  
  
[phone] (Fax) Registered Dietitian Nutrition       24            
  
  
  
                      Team Member Relationship Specialty  Start Date End Date  
   
                                                      
  
  
Tim Carmichael MD  
  
  
NPI: [id number]  
  
  
[phone] (Work)  
  
  
[phone] (Fax) PCP - General   [facility] 3/14/16           
   
                                                      
  
  
[address]ely Tony [address]  
  
  
NPI: [id number]  
  
  
[address] [address]  
  
  
[phone] (Work)  
  
  
[phone] (Fax) Registered Dietitian Nutrition       24            
  
  
  
                      Team Member Relationship Specialty  Start Date End Date  
   
                                                      
  
  
Tim Carmichael MD  
  
  
NPI: [id number]  
  
  
[phone] (Work)  
  
  
[phone] (Fax) PCP - [facility] 3/14/16           
   
                                                      
  
  
[address]ely Tony RD  
  
  
NPI: [id number]  
  
  
[place] [id number]  
  
  
[phone] (Work)  
  
  
[phone] (Fax) Registered Dietitian Nutrition       24            
  
  
  
                      Team Member Relationship Specialty  Start Date End Date  
   
                                                      
  
  
Tim Carmichael MD  
  
  
NPI: [id number]  
  
  
[phone] (Work)  
  
  
[phone] (Fax) PCP - General   [facility] 3/14/16           
   
                                                      
  
  
[address]ely Tony [address]  
  
  
NPI: [id number]  
  
  
[address] [address]  
  
  
[phone] (Work)  
  
  
[phone] (Fax) Registered Dietitian Nutrition       24            
  
  
  
                      Team Member Relationship Specialty  Start Date End Date  
   
                                                      
  
  
Tim Carmichael MD  
  
  
NPI: [id number]  
  
  
[phone] (Work)  
  
  
[phone] (Fax) PCP - General   [facility] 3/14/16           
   
                                                      
  
  
[address] Tony [address]  
  
  
NPI: [id number]  
  
  
[address] [address]  
  
  
[phone] (Work)  
  
  
[phone] (Fax) Registered Dietitian Nutrition       24            
  
  
  
                      Team Member Relationship Specialty  Start Date End Date  
   
                                                      
  
  
Tim Carmichael MD  
  
  
NPI: [id number]  
  
  
[phone] (Work)  
  
  
[phone] (Fax) PCP - General   Family Medicine 3/14/16           
   
                                                      
  
  
[address] Tony [address]  
  
  
NPI: [id number]  
  
  
[address] [address] [phone] (Work)  
  
  
[phone] (Fax) Registered Dietitian Nutrition       24            
  
  
  
                      Team Member Relationship Specialty  Start Date End Date  
   
                                                      
  
  
Tim Carmichael MD  
  
  
NPI: [id number]  
  
  
[phone] (Work)  
  
  
[phone] (Fax) PCP - General   Family Medicine 3/14/16           
   
                                                      
  
  
[address]ely Tony RD  
  
  
NPI: [id number]  
  
  
[address] [address]  
  
  
[phone] (Work)  
  
  
[phone] (Fax) Registered Dietitian Nutrition       24            
  
  
  
                      Team Member Relationship Specialty  Start Date End Date  
   
                                                      
  
  
Tim Carmichael MD  
  
  
NPI: [id number]  
  
  
[phone] (Work)  
  
  
[phone] (Fax) PCP - General   Family Medicine 3/14/16           
   
                                                      
  
  
[address]ely Tony RD  
  
  
NPI: [id number]  
  
  
[address] [address]  
  
  
[phone] (Work)  
  
  
[phone] (Fax) Registered Dietitian Nutrition       24            
  
  
  
  
  
                                                    Source Comments (unrecognize  
d section and content)  
   
                                                    In the event this informatio  
n is protected by the Federal Confidentiality of   
Alcohol   
and Drug Abuse Patient Records regulations: This information has been disclosed 
to   
you from records protected by Federal confidentiality rules (42 CFR Part 2). The
   
Federal rules prohibit you from making any further disclosure of this 
information   
unless further disclosure is expressly permitted by the written consent of the 
person   
to whom it pertains or as otherwise permitted by 42 CFR Part 2. A general   
authorization for the release of medical or other information is NOT sufficient 
for   
this purpose. The Federal rules restrict any use of the information to 
criminally   
investigate or prosecute any alcohol or drug abuse patient.[facility] HospitalIn 
the   
event this information is protected by the Federal Confidentiality of Alcohol 
and   
Drug Abuse Patient Records regulations: This information has been disclosed to 
you   
from records protected by Federal confidentiality rules (42 CFR Part 2). The 
Federal   
rules prohibit you from making any further disclosure of this information unless
   
further disclosure is expressly permitted by the written consent of the person 
to   
whom it pertains or as otherwise permitted by 42 CFR Part 2. A general 
authorization   
for the release of medical or other information is NOT sufficient for this 
purpose.   
The Federal rules restrict any use of the information to criminally investigate 
or   
prosecute any alcohol or drug abuse patient.[facility] HospitalIn the event this   
information is protected by the Federal Confidentiality of Alcohol and Drug 
Abuse   
Patient Records regulations: This information has been disclosed to you from 
records   
protected by Federal confidentiality rules (42 CFR Part 2). The Federal rules   
prohibit you from making any further disclosure of this information unless 
further   
disclosure is expressly permitted by the written consent of the person to whom 
it   
pertains or as otherwise permitted by 42 CFR Part 2. A general authorization for
 the   
release of medical or other information is NOT sufficient for this purpose. The   
Federal rules restrict any use of the information to criminally investigate or   
prosecute any alcohol or drug abuse patient.[facility] HospitalIn the event this   
information is protected by the Federal Confidentiality of Alcohol and Drug 
Abuse   
Patient Records regulations: This information has been disclosed to you from 
records   
protected by Federal confidentiality rules (42 CFR Part 2). The Federal rules   
prohibit you from making any further disclosure of this information unless 
further   
disclosure is expressly permitted by the written consent of the person to whom 
it   
pertains or as otherwise permitted by 42 CFR Part 2. A general authorization for
 the   
release of medical or other information is NOT sufficient for this purpose. The   
Federal rules restrict any use of the information to criminally investigate or   
prosecute any alcohol or drug abuse patient.[facility] HospitalIn the event this   
information is protected by the Federal Confidentiality of Alcohol and Drug 
Abuse   
Patient Records regulations: This information has been disclosed to you from 
records   
protected by Federal confidentiality rules (42 CFR Part 2). The Federal rules   
prohibit you from making any further disclosure of this information unless 
further   
disclosure is expressly permitted by the written consent of the person to whom 
it   
pertains or as otherwise permitted by 42 CFR Part 2. A general authorization for
 the   
release of medical or other information is NOT sufficient for this purpose. The   
Federal rules restrict any use of the information to criminally investigate or   
prosecute any alcohol or drug abuse patient.[facility] HospitalIn the event this   
information is protected by the Federal Confidentiality of Alcohol and Drug 
Abuse   
Patient Records regulations: This information has been disclosed to you from 
records   
protected by Federal confidentiality rules (42 CFR Part 2). The Federal rules   
prohibit you from making any further disclosure of this information unless 
further   
disclosure is expressly permitted by the written consent of the person to whom 
it   
pertains or as otherwise permitted by 42 CFR Part 2. A general authorization for
 the   
release of medical or other information is NOT sufficient for this purpose. The   
Federal rules restrict any use of the information to criminally investigate or   
prosecute any alcohol or drug abuse patient.[facility] HospitalIn the event this   
information is protected by the Federal Confidentiality of Alcohol and Drug 
Abuse   
Patient Records regulations: This information has been disclosed to you from 
records   
protected by Federal confidentiality rules (42 CFR Part 2). The Federal rules   
prohibit you from making any further disclosure of this information unless 
further   
disclosure is expressly permitted by the written consent of the person to whom 
it   
pertains or as otherwise permitted by 42 CFR Part 2. A general authorization for
 the   
release of medical or other information is NOT sufficient for this purpose. The   
Federal rules restrict any use of the information to criminally investigate or   
prosecute any alcohol or drug abuse patient.[facility] HospitalIn the event this   
information is protected by the Federal Confidentiality of Alcohol and Drug 
Abuse   
Patient Records regulations: This information has been disclosed to you from 
records   
protected by Federal confidentiality rules (42 CFR Part 2). The Federal rules   
prohibit you from making any further disclosure of this information unless 
further   
disclosure is expressly permitted by the written consent of the person to whom 
it   
pertains or as otherwise permitted by 42 CFR Part 2. A general authorization for
 the   
release of medical or other information is NOT sufficient for this purpose. The   
Federal rules restrict any use of the information to criminally investigate or   
prosecute any alcohol or drug abuse patient.[facility] HospitalIn the event this   
information is protected by the Federal Confidentiality of Alcohol and Drug 
Abuse   
Patient Records regulations: This information has been disclosed to you from 
records   
protected by Federal confidentiality rules (42 CFR Part 2). The Federal rules   
prohibit you from making any further disclosure of this information unless 
further   
disclosure is expressly permitted by the written consent of the person to whom 
it   
pertains or as otherwise permitted by 42 CFR Part 2. A general authorization for
 the   
release of medical or other information is NOT sufficient for this purpose. The   
Federal rules restrict any use of the information to criminally investigate or   
prosecute any alcohol or drug abuse patient.[facility] HospitalIn the event this   
information is protected by the Federal Confidentiality of Alcohol and Drug 
Abuse   
Patient Records regulations: This information has been disclosed to you from 
records   
protected by Federal confidentiality rules (42 CFR Part 2). The Federal rules   
prohibit you from making any further disclosure of this information unless 
further   
disclosure is expressly permitted by the written consent of the person to whom 
it   
pertains or as otherwise permitted by 42 CFR Part 2. A general authorization for
 the   
release of medical or other information is NOT sufficient for this purpose. The   
Federal rules restrict any use of the information to criminally investigate or   
prosecute any alcohol or drug abuse patient.[facility] HospitalIn the event this   
information is protected by the Federal Confidentiality of Alcohol and Drug 
Abuse   
Patient Records regulations: This information has been disclosed to you from 
records   
protected by Federal confidentiality rules (42 CFR Part 2). The Federal rules   
prohibit you from making any further disclosure of this information unless 
further   
disclosure is expressly permitted by the written consent of the person to whom 
it   
pertains or as otherwise permitted by 42 CFR Part 2. A general authorization for
 the   
release of medical or other information is NOT sufficient for this purpose. The   
Federal rules restrict any use of the information to criminally investigate or   
prosecute any alcohol or drug abuse patient.[facility] HospitalIn the event this   
information is protected by the Federal Confidentiality of Alcohol and Drug 
Abuse   
Patient Records regulations: This information has been disclosed to you from 
records   
protected by Federal confidentiality rules (42 CFR Part 2). The Federal rules   
prohibit you from making any further disclosure of this information unless 
further   
disclosure is expressly permitted by the written consent of the person to whom 
it   
pertains or as otherwise permitted by 42 CFR Part 2. A general authorization for
 the   
release of medical or other information is NOT sufficient for this purpose. The   
Federal rules restrict any use of the information to criminally investigate or   
prosecute any alcohol or drug abuse patient.[facility] HospitalIn the event this   
information is protected by the Federal Confidentiality of Alcohol and Drug 
Abuse   
Patient Records regulations: This information has been disclosed to you from 
records   
protected by Federal confidentiality rules (42 CFR Part 2). The Federal rules   
prohibit you from making any further disclosure of this information unless 
further   
disclosure is expressly permitted by the written consent of the person to whom 
it   
pertains or as otherwise permitted by 42 CFR Part 2. A general authorization for
 the   
release of medical or other information is NOT sufficient for this purpose. The   
Federal rules restrict any use of the information to criminally investigate or   
prosecute any alcohol or drug abuse patient.[facility] HospitalIn the event this   
information is protected by the Federal Confidentiality of Alcohol and Drug 
Abuse   
Patient Records regulations: This information has been disclosed to you from 
records   
protected by Federal confidentiality rules (42 CFR Part 2). The Federal rules   
prohibit you from making any further disclosure of this information unless 
further   
disclosure is expressly permitted by the written consent of the person to whom 
it   
pertains or as otherwise permitted by 42 CFR Part 2. A general authorization for
 the   
release of medical or other information is NOT sufficient for this purpose. The   
Federal rules restrict any use of the information to criminally investigate or   
prosecute any alcohol or drug abuse patient.[facility] HospitalIn the event this   
information is protected by the Federal Confidentiality of Alcohol and Drug 
Abuse   
Patient Records regulations: This information has been disclosed to you from 
records   
protected by Federal confidentiality rules (42 CFR Part 2). The Federal rules   
prohibit you from making any further disclosure of this information unless 
further   
disclosure is expressly permitted by the written consent of the person to whom 
it   
pertains or as otherwise permitted by 42 CFR Part 2. A general authorization for
 the   
release of medical or other information is NOT sufficient for this purpose. The   
Federal rules restrict any use of the information to criminally investigate or   
prosecute any alcohol or drug abuse patient.[facility] HospitalIn the event this   
information is protected by the Federal Confidentiality of Alcohol and Drug 
Abuse   
Patient Records regulations: This information has been disclosed to you from 
records   
protected by Federal confidentiality rules (42 CFR Part 2). The Federal rules   
prohibit you from making any further disclosure of this information unless 
further   
disclosure is expressly permitted by the written consent of the person to whom 
it   
pertains or as otherwise permitted by 42 CFR Part 2. A general authorization for
 the   
release of medical or other information is NOT sufficient for this purpose. The   
Federal rules restrict any use of the information to criminally investigate or   
prosecute any alcohol or drug abuse patient.[facility] HospitalIn the event this   
information is protected by the Federal Confidentiality of Alcohol and Drug 
Abuse   
Patient Records regulations: This information has been disclosed to you from 
records   
protected by Federal confidentiality rules (42 CFR Part 2). The Federal rules   
prohibit you from making any further disclosure of this information unless 
further   
disclosure is expressly permitted by the written consent of the person to whom 
it   
pertains or as otherwise permitted by 42 CFR Part 2. A general authorization for
 the   
release of medical or other information is NOT sufficient for this purpose. The   
Federal rules restrict any use of the information to criminally investigate or   
prosecute any alcohol or drug abuse patient.[facility] HospitalIn the event this   
information is protected by the Federal Confidentiality of Alcohol and Drug 
Abuse   
Patient Records regulations: This information has been disclosed to you from 
records   
protected by Federal confidentiality rules (42 CFR Part 2). The Federal rules   
prohibit you from making any further disclosure of this information unless 
further   
disclosure is expressly permitted by the written consent of the person to whom 
it   
pertains or as otherwise permitted by 42 CFR Part 2. A general authorization for
 the   
release of medical or other information is NOT sufficient for this purpose. The   
Federal rules restrict any use of the information to criminally investigate or   
prosecute any alcohol or drug abuse patient.[facility] HospitalIn the event this   
information is protected by the Federal Confidentiality of Alcohol and Drug 
Abuse   
Patient Records regulations: This information has been disclosed to you from 
records   
protected by Federal confidentiality rules (42 CFR Part 2). The Federal rules   
prohibit you from making any further disclosure of this information unless 
further   
disclosure is expressly permitted by the written consent of the person to whom 
it   
pertains or as otherwise permitted by 42 CFR Part 2. A general authorization for
 the   
release of medical or other information is NOT sufficient for this purpose. The   
Federal rules restrict any use of the information to criminally investigate or   
prosecute any alcohol or drug abuse patient.[facility] HospitalIn the event this   
information is protected by the Federal Confidentiality of Alcohol and Drug 
Abuse   
Patient Records regulations: This information has been disclosed to you from 
records   
protected by Federal confidentiality rules (42 CFR Part 2). The Federal rules   
prohibit you from making any further disclosure of this information unless 
further   
disclosure is expressly permitted by the written consent of the person to whom 
it   
pertains or as otherwise permitted by 42 CFR Part 2. A general authorization for
 the   
release of medical or other information is NOT sufficient for this purpose. The   
Federal rules restrict any use of the information to criminally investigate or   
prosecute any alcohol or drug abuse patient.[facility] HospitalIn the event this   
information is protected by the Federal Confidentiality of Alcohol and Drug 
Abuse   
Patient Records regulations: This information has been disclosed to you from 
records   
protected by Federal confidentiality rules (42 CFR Part 2). The Federal rules   
prohibit you from making any further disclosure of this information unless 
further   
disclosure is expressly permitted by the written consent of the person to whom 
it   
pertains or as otherwise permitted by 42 CFR Part 2. A general authorization for
 the   
release of medical or other information is NOT sufficient for this purpose. The   
Federal rules restrict any use of the information to criminally investigate or   
prosecute any alcohol or drug abuse patient.[facility] HospitalIn the event this   
information is protected by the Federal Confidentiality of Alcohol and Drug 
Abuse   
Patient Records regulations: This information has been disclosed to you from 
records   
protected by Federal confidentiality rules (42 CFR Part 2). The Federal rules   
prohibit you from making any further disclosure of this information unless 
further   
disclosure is expressly permitted by the written consent of the person to whom 
it   
pertains or as otherwise permitted by 42 CFR Part 2. A general authorization for
 the   
release of medical or other information is NOT sufficient for this purpose. The   
Federal rules restrict any use of the information to criminally investigate or   
prosecute any alcohol or drug abuse patient.[facility] HospitalIn the event this   
information is protected by the Federal Confidentiality of Alcohol and Drug 
Abuse   
Patient Records regulations: This information has been disclosed to you from 
records   
protected by Federal confidentiality rules (42 CFR Part 2). The Federal rules   
prohibit you from making any further disclosure of this information unless 
further   
disclosure is expressly permitted by the written consent of the person to whom 
it   
pertains or as otherwise permitted by 42 CFR Part 2. A general authorization for
 the   
release of medical or other information is NOT sufficient for this purpose. The   
Federal rules restrict any use of the information to criminally investigate or   
prosecute any alcohol or drug abuse patient.[facility] HospitalIn the event this   
information is protected by the Federal Confidentiality of Alcohol and Drug 
Abuse   
Patient Records regulations: This information has been disclosed to you from 
records   
protected by Federal confidentiality rules (42 CFR Part 2). The Federal rules   
prohibit you from making any further disclosure of this information unless 
further   
disclosure is expressly permitted by the written consent of the person to whom 
it   
pertains or as otherwise permitted by 42 CFR Part 2. A general authorization for
 the   
release of medical or other information is NOT sufficient for this purpose. The   
Federal rules restrict any use of the information to criminally investigate or   
prosecute any alcohol or drug abuse patient.[facility] HospitalIn the event this   
information is protected by the Federal Confidentiality of Alcohol and Drug 
Abuse   
Patient Records regulations: This information has been disclosed to you from 
records   
protected by Federal confidentiality rules (42 CFR Part 2). The Federal rules   
prohibit you from making any further disclosure of this information unless 
further   
disclosure is expressly permitted by the written consent of the person to whom 
it   
pertains or as otherwise permitted by 42 CFR Part 2. A general authorization for
 the   
release of medical or other information is NOT sufficient for this purpose. The   
Federal rules restrict any use of the information to criminally investigate or   
prosecute any alcohol or drug abuse patient.[facility] HospitalIn the event this   
information is protected by the Federal Confidentiality of Alcohol and Drug 
Abuse   
Patient Records regulations: This information has been disclosed to you from 
records   
protected by Federal confidentiality rules (42 CFR Part 2). The Federal rules   
prohibit you from making any further disclosure of this information unless 
further   
disclosure is expressly permitted by the written consent of the person to whom 
it   
pertains or as otherwise permitted by 42 CFR Part 2. A general authorization for
 the   
release of medical or other information is NOT sufficient for this purpose. The   
Federal rules restrict any use of the information to criminally investigate or   
prosecute any alcohol or drug abuse patient.[facility] HospitalIn the event this   
information is protected by the Federal Confidentiality of Alcohol and Drug 
Abuse   
Patient Records regulations: This information has been disclosed to you from 
records   
protected by Federal confidentiality rules (42 CFR Part 2). The Federal rules   
prohibit you from making any further disclosure of this information unless 
further   
disclosure is expressly permitted by the written consent of the person to whom 
it   
pertains or as otherwise permitted by 42 CFR Part 2. A general authorization for
 the   
release of medical or other information is NOT sufficient for this purpose. The   
Federal rules restrict any use of the information to criminally investigate or   
prosecute any alcohol or drug abuse patient.[facility] HospitalIn the event this   
information is protected by the Federal Confidentiality of Alcohol and Drug 
Abuse   
Patient Records regulations: This information has been disclosed to you from 
records   
protected by Federal confidentiality rules (42 CFR Part 2). The Federal rules   
prohibit you from making any further disclosure of this information unless 
further   
disclosure is expressly permitted by the written consent of the person to whom 
it   
pertains or as otherwise permitted by 42 CFR Part 2. A general authorization for
 the   
release of medical or other information is NOT sufficient for this purpose. The   
Federal rules restrict any use of the information to criminally investigate or   
prosecute any alcohol or drug abuse patient.[facility] HospitalIn the event this   
information is protected by the Federal Confidentiality of Alcohol and Drug 
Abuse   
Patient Records regulations: This information has been disclosed to you from 
records   
protected by Federal confidentiality rules (42 CFR Part 2). The Federal rules   
prohibit you from making any further disclosure of this information unless 
further   
disclosure is expressly permitted by the written consent of the person to whom 
it   
pertains or as otherwise permitted by 42 CFR Part 2. A general authorization for
 the   
release of medical or other information is NOT sufficient for this purpose. The   
Federal rules restrict any use of the information to criminally investigate or   
prosecute any alcohol or drug abuse patient.[facility] HospitalIn the event this   
information is protected by the Federal Confidentiality of Alcohol and Drug 
Abuse   
Patient Records regulations: This information has been disclosed to you from 
records   
protected by Federal confidentiality rules (42 CFR Part 2). The Federal rules   
prohibit you from making any further disclosure of this information unless 
further   
disclosure is expressly permitted by the written consent of the person to whom 
it   
pertains or as otherwise permitted by 42 CFR Part 2. A general authorization for
 the   
release of medical or other information is NOT sufficient for this purpose. The   
Federal rules restrict any use of the information to criminally investigate or   
prosecute any alcohol or drug abuse patient.[facility] HospitalIn the event this   
information is protected by the Federal Confidentiality of Alcohol and Drug 
Abuse   
Patient Records regulations: This information has been disclosed to you from 
records   
protected by Federal confidentiality rules (42 CFR Part 2). The Federal rules   
prohibit you from making any further disclosure of this information unless 
further   
disclosure is expressly permitted by the written consent of the person to whom 
it   
pertains or as otherwise permitted by 42 CFR Part 2. A general authorization for
 the   
release of medical or other information is NOT sufficient for this purpose. The   
Federal rules restrict any use of the information to criminally investigate or   
prosecute any alcohol or drug abuse patient.[facility] HospitalIn the event this   
information is protected by the Federal Confidentiality of Alcohol and Drug 
Abuse   
Patient Records regulations: This information has been disclosed to you from 
records   
protected by Federal confidentiality rules (42 CFR Part 2). The Federal rules   
prohibit you from making any further disclosure of this information unless 
further   
disclosure is expressly permitted by the written consent of the person to whom 
it   
pertains or as otherwise permitted by 42 CFR Part 2. A general authorization for
 the   
release of medical or other information is NOT sufficient for this purpose. The   
Federal rules restrict any use of the information to criminally investigate or   
prosecute any alcohol or drug abuse patient.[facility] HospitalIn the event this   
information is protected by the Federal Confidentiality of Alcohol and Drug 
Abuse   
Patient Records regulations: This information has been disclosed to you from 
records   
protected by Federal confidentiality rules (42 CFR Part 2). The Federal rules   
prohibit you from making any further disclosure of this information unless 
further   
disclosure is expressly permitted by the written consent of the person to whom 
it   
pertains or as otherwise permitted by 42 CFR Part 2. A general authorization for
 the   
release of medical or other information is NOT sufficient for this purpose. The   
Federal rules restrict any use of the information to criminally investigate or   
prosecute any alcohol or drug abuse patient.[facility] HospitalIn the event this   
information is protected by the Federal Confidentiality of Alcohol and Drug 
Abuse   
Patient Records regulations: This information has been disclosed to you from 
records   
protected by Federal confidentiality rules (42 CFR Part 2). The Federal rules   
prohibit you from making any further disclosure of this information unless 
further   
disclosure is expressly permitted by the written consent of the person to whom 
it   
pertains or as otherwise permitted by 42 CFR Part 2. A general authorization for
 the   
release of medical or other information is NOT sufficient for this purpose. The   
Federal rules restrict any use of the information to criminally investigate or   
prosecute any alcohol or drug abuse patient.[facility] HospitalIn the event this   
information is protected by the Federal Confidentiality of Alcohol and Drug 
Abuse   
Patient Records regulations: This information has been disclosed to you from 
records   
protected by Federal confidentiality rules (42 CFR Part 2). The Federal rules   
prohibit you from making any further disclosure of this information unless 
further   
disclosure is expressly permitted by the written consent of the person to whom 
it   
pertains or as otherwise permitted by 42 CFR Part 2. A general authorization for
 the   
release of medical or other information is NOT sufficient for this purpose. The   
Federal rules restrict any use of the information to criminally investigate or   
prosecute any alcohol or drug abuse patient.[facility] HospitalIn the event this   
information is protected by the Federal Confidentiality of Alcohol and Drug 
Abuse   
Patient Records regulations: This information has been disclosed to you from 
records   
protected by Federal confidentiality rules (42 CFR Part 2). The Federal rules   
prohibit you from making any further disclosure of this information unless 
further   
disclosure is expressly permitted by the written consent of the person to whom 
it   
pertains or as otherwise permitted by 42 CFR Part 2. A general authorization for
 the   
release of medical or other information is NOT sufficient for this purpose. The   
Federal rules restrict any use of the information to criminally investigate or   
prosecute any alcohol or drug abuse patient.[facility] HospitalIn the event this   
information is protected by the Federal Confidentiality of Alcohol and Drug 
Abuse   
Patient Records regulations: This information has been disclosed to you from 
records   
protected by Federal confidentiality rules (42 CFR Part 2). The Federal rules   
prohibit you from making any further disclosure of this information unless 
further   
disclosure is expressly permitted by the written consent of the person to whom 
it   
pertains or as otherwise permitted by 42 CFR Part 2. A general authorization for
 the   
release of medical or other information is NOT sufficient for this purpose. The   
Federal rules restrict any use of the information to criminally investigate or   
prosecute any alcohol or drug abuse patient.[facility] HospitalIn the event this   
information is protected by the Federal Confidentiality of Alcohol and Drug 
Abuse   
Patient Records regulations: This information has been disclosed to you from 
records   
protected by Federal confidentiality rules (42 CFR Part 2). The Federal rules   
prohibit you from making any further disclosure of this information unless 
further   
disclosure is expressly permitted by the written consent of the person to whom 
it   
pertains or as otherwise permitted by 42 CFR Part 2. A general authorization for
 the   
release of medical or other information is NOT sufficient for this purpose. The   
Federal rules restrict any use of the information to criminally investigate or   
prosecute any alcohol or drug abuse patient.[facility] HospitalIn the event this   
information is protected by the Federal Confidentiality of Alcohol and Drug 
Abuse   
Patient Records regulations: This information has been disclosed to you from 
records   
protected by Federal confidentiality rules (42 CFR Part 2). The Federal rules   
prohibit you from making any further disclosure of this information unless 
further   
disclosure is expressly permitted by the written consent of the person to whom 
it   
pertains or as otherwise permitted by 42 CFR Part 2. A general authorization for
 the   
release of medical or other information is NOT sufficient for this purpose. The   
Federal rules restrict any use of the information to criminally investigate or   
prosecute any alcohol or drug abuse patient.[facility] HospitalIn the event this   
information is protected by the Federal Confidentiality of Alcohol and Drug 
Abuse   
Patient Records regulations: This information has been disclosed to you from 
records   
protected by Federal confidentiality rules (42 CFR Part 2). The Federal rules   
prohibit you from making any further disclosure of this information unless 
further   
disclosure is expressly permitted by the written consent of the person to whom 
it   
pertains or as otherwise permitted by 42 CFR Part 2. A general authorization for
 the   
release of medical or other information is NOT sufficient for this purpose. The   
Federal rules restrict any use of the information to criminally investigate or   
prosecute any alcohol or drug abuse patient.[facility] HospitalIn the event this   
information is protected by the Federal Confidentiality of Alcohol and Drug 
Abuse   
Patient Records regulations: This information has been disclosed to you from 
records   
protected by Federal confidentiality rules (42 CFR Part 2). The Federal rules   
prohibit you from making any further disclosure of this information unless 
further   
disclosure is expressly permitted by the written consent of the person to whom 
it   
pertains or as otherwise permitted by 42 CFR Part 2. A general authorization for
 the   
release of medical or other information is NOT sufficient for this purpose. The   
Federal rules restrict any use of the information to criminally investigate or   
prosecute any alcohol or drug abuse patient.[facility] HospitalIn the event this   
information is protected by the Federal Confidentiality of Alcohol and Drug 
Abuse   
Patient Records regulations: This information has been disclosed to you from 
records   
protected by Federal confidentiality rules (42 CFR Part 2). The Federal rules   
prohibit you from making any further disclosure of this information unless 
further   
disclosure is expressly permitted by the written consent of the person to whom 
it   
pertains or as otherwise permitted by 42 CFR Part 2. A general authorization for
 the   
release of medical or other information is NOT sufficient for this purpose. The   
Federal rules restrict any use of the information to criminally investigate or   
prosecute any alcohol or drug abuse patient.[facility]  
  
FOR RECORDS PERTAINING TO PATIENTS WHO ARE OR HAVE BEEN ENROLLED IN A CHEMICAL 
DEPENDENCY/SUBSTANCEABUSE PROGRAM, SOME INFORMATION MAY BE OMITTED. This 
clinical summary was aggregated from multiple sources. Caution should be 
exercised in using it in the provision of clinical care. This summary normalizes
 information from multiple sources, and as a consequence, information in this 
document may materially change the coding, format and clinical context of 
patient data. In addition, data may be omitted in some cases. CLINICAL DECISIONS
 SHOULD BE BASED ON THE PRIMARY CLINICAL RECORDS. [employer] [facility] provides
 no warranty or guarantee of the accuracy or completeness of information in this
 document.

## 2024-07-31 VITALS
DIASTOLIC BLOOD PRESSURE: 74 MMHG | HEART RATE: 82 BPM | SYSTOLIC BLOOD PRESSURE: 124 MMHG | TEMPERATURE: 98.2 F | RESPIRATION RATE: 16 BRPM

## 2024-07-31 VITALS
HEART RATE: 89 BPM | TEMPERATURE: 97.8 F | SYSTOLIC BLOOD PRESSURE: 136 MMHG | DIASTOLIC BLOOD PRESSURE: 80 MMHG | OXYGEN SATURATION: 96 % | RESPIRATION RATE: 16 BRPM

## 2024-07-31 VITALS
DIASTOLIC BLOOD PRESSURE: 80 MMHG | RESPIRATION RATE: 16 BRPM | HEART RATE: 90 BPM | SYSTOLIC BLOOD PRESSURE: 134 MMHG | TEMPERATURE: 97.8 F | OXYGEN SATURATION: 99 %

## 2024-07-31 VITALS
SYSTOLIC BLOOD PRESSURE: 138 MMHG | OXYGEN SATURATION: 98 % | HEART RATE: 94 BPM | RESPIRATION RATE: 17 BRPM | DIASTOLIC BLOOD PRESSURE: 91 MMHG | TEMPERATURE: 98.5 F

## 2024-07-31 NOTE — PN.OBGYN_ITS
Subjective    
Subjective    
Patient seen at bedside. Denies pain. Infant in SCN for blood sugar issues.   
Breastfeeding infant. Lochia decreasing.     
    
Objective Data    
Objective Data    
Vital Signs:     
Vital Signs    
    
    
    
Temp Pulse Resp BP Pulse Ox O2 Del Method    
     
 97.8 F   89   16   136/80 H  96   Room Air     
     
 24 04:10  24 04:10  24 04:10  24 04:10  24 04:10   
24 04:10    
    
    
    
    
Oxygen Delivery Method           Room Air                                         
       
Weight:                          250 lb 14.177 oz                                 
       
Body Mass Index (BMI)            40.4                                             
       
    
    
Intake & Output:     
                       Intake and Output for Last 24 Hours    
    
    
    
 24    
    
 23:59 23:59 23:59    
     
Intake Total  3750.00 / 3750.00     
     
Output Total 100 / 100 2200 / 2200 1100 / 1100    
     
Balance -100 / -100 1550.00 / 1550.00 -1100 / -1100    
    
    
    
Lab / Micro Data    
    
                                                        24 20:12              
    
Labs:     
                         Laboratory Results - last 24 hr    
    
24 11:51: POC Glucose 77    
24 12:56: POC Glucose 75    
24 14:07: POC Glucose 81    
24 15:07: POC Glucose 78    
24 16:28: POC Glucose 77    
24 20:11: POC Glucose 86    
24 05:37: POC Glucose 93    
    
    
    
ROS    
Eyes    
Eyes: Denies blurry vision, change in vision or spots in vision    
ENT    
HEENT: Denies dizziness or headache(s)    
Cardiovascular    
Cardiovascular: Denies abdominal pain, chest pain or dyspnea    
Respiratory/Chest    
Respiratory/Chest: Denies cough, dyspnea, shortness of breath at rest or   
shortness of breath with exertion    
Gastrointestinal    
Gastrointestinal: Denies abdominal pain, diarrhea or vomiting    
Genitourinary    
Genitourinary: Denies change in urinary stream, difficulty urinating or dysuria    
Musculoskeletal    
Musculoskeletal: Reports none    
Integumentary    
Integumentary: Denies rash    
Neurologic    
Neurologic: Denies dizziness, headache(s), memory loss or weakness    
    
Physical Exam    
Const    
alert and no apparent distress    
General Appearance: cooperative and comfortable    
Exam Limitations: no limitations    
HEENT    
normocephalic    
Eyes    
General Eye: normal appearance of both eyes    
Neck    
full ROM    
General: normal visual inspection    
Chest    
Chest: symmetrical chest wall rise    
Resp    
normal respiratory effort and normal air movement    
Effort and Inspection: symmetric chest movement    
Auscultation: clear to auscultation bilaterally    
Cardio    
regular rate and regular rhythm    
GI    
normal to inspection, nondistended, normoactive bowel sounds    
Back/Spine    
normal ROM    
Extremity    
full ROM and no calf tenderness    
General Extremity: normal exam except as noted    
Skin    
no rashes or lesions noted    
Neuro    
CN's II-XII intact bilaterally    
Psych    
mental status grossly normal    
    
Assessment & Plan    
(1)  (spontaneous vaginal delivery):     
(2) Maternal obesity syndrome in third trimester:     
(3) Gestational diabetes mellitus, class A2:     
(4) High-risk pregnancy in third trimester:     
(5) 37 weeks gestation of pregnancy:     
PLAN:     
Plan    
PPD 1     
Routine care    
Infant in SCN     
Breast feeding without difficulty- putting baby to breast in SCN

## 2024-08-01 VITALS
OXYGEN SATURATION: 98 % | DIASTOLIC BLOOD PRESSURE: 93 MMHG | TEMPERATURE: 97.88 F | SYSTOLIC BLOOD PRESSURE: 139 MMHG | RESPIRATION RATE: 16 BRPM | HEART RATE: 78 BPM

## 2024-08-01 VITALS — DIASTOLIC BLOOD PRESSURE: 82 MMHG | SYSTOLIC BLOOD PRESSURE: 128 MMHG

## 2024-08-01 VITALS
TEMPERATURE: 98.24 F | RESPIRATION RATE: 16 BRPM | SYSTOLIC BLOOD PRESSURE: 137 MMHG | HEART RATE: 97 BPM | OXYGEN SATURATION: 98 % | DIASTOLIC BLOOD PRESSURE: 92 MMHG

## 2024-08-01 VITALS — SYSTOLIC BLOOD PRESSURE: 134 MMHG | DIASTOLIC BLOOD PRESSURE: 82 MMHG

## 2024-08-01 VITALS
DIASTOLIC BLOOD PRESSURE: 64 MMHG | RESPIRATION RATE: 16 BRPM | OXYGEN SATURATION: 100 % | SYSTOLIC BLOOD PRESSURE: 101 MMHG | HEART RATE: 93 BPM

## 2024-08-01 NOTE — DS.PCM_ITS
Providers    
Date of Admission: 24    
Primary Care Physician:     
[facility]    
    
Reason For Visit: VAGINAL BIRTH    
    
Diagnosis    
Discharge Diagnosis    
(1)  (spontaneous vaginal delivery):     
      Status: Acute    
      Code(s):    
O80 - Encounter for full-term uncomplicated delivery    
(2) Maternal obesity syndrome in third trimester:     
      Status: Acute    
      Code(s):    
O99.213 - Obesity complicating pregnancy, third trimester    
(3) Gestational diabetes mellitus, class A2:     
      Status: Acute    
      Code(s):    
O24.419 - Gestational diabetes mellitus in pregnancy, unspecified control    
(4) High-risk pregnancy in third trimester:     
      Status: Acute    
      Code(s):    
O09.93 - Supervision of high risk pregnancy, unspecified, third trimester    
(5) 37 weeks gestation of pregnancy:     
      Status: Acute    
      Code(s):    
Z3A.37 - 37 weeks gestation of pregnancy    
    
Plan    
PPD 1     
Routine care    
Infant in [facility]     
Breast feeding without difficulty- putting baby to breast in [facility]    
    
    
Medications at Discharge    
Home Medications    
    
aripiprazole 2 mg tablet (Abilify) 2 mg PO DAILY depression 24     
omeprazole 20 mg capsule,delayed release 20 mg PO BID acid reflux 24     
sertraline 150 mg capsule 150 mg PO DAILY depression 24     
    
    
    
Hospital Course    
Operations    
None    
Procedures    
None    
Summary of Care Provided    
Minutes Spent on Discharge: 15    
Hospital Course:     
Patient had .     
    
Physical Exam    
Narrative    
Patient seen at bedside. Denies headache, vision changes, SOB, CP.   
Breastfeeding. Infant remains at [facility]. Patient desires discharge home and remain   
Hotel status.    
Const    
alert and no apparent distress    
General Appearance: cooperative and comfortable    
Exam Limitations: no limitations    
HEENT    
normocephalic    
Eyes    
General Eye: normal appearance of both eyes    
Neck    
full ROM    
General: normal visual inspection    
Chest    
Chest: symmetrical chest wall rise    
Resp    
normal respiratory effort and normal air movement    
Effort and Inspection: symmetric chest movement    
Auscultation: clear to auscultation bilaterally    
Cardio    
regular rate and regular rhythm    
GI    
normal to inspection, nondistended, normoactive bowel sounds    
Back/Spine    
normal ROM    
Extremity    
full ROM and no calf tenderness    
General Extremity: normal exam except as noted    
Skin    
no rashes or lesions noted    
Neuro    
CN's II-XII intact bilaterally    
Psych    
mental status grossly normal    
    
Weight / BMI    
                                     Weight    
    
    
    
Weight:                        250 lb 14.177 oz                                   
    
     
Body Mass Index (BMI)          40.4                                               
    
    
    
    
    
ABG / Lab / Microbiology Data    
    
                                                        24 20:12              
    
Laboratory:     
                         Laboratory Results - last 24 hr    
    
24 17:29: POC Glucose 67 L    
24 17:42: POC Glucose 70 L    
    
    
    
D/C Instructions    
Discharge Diet: No restrictions    
May resume sexual activity in: 6-8 weeks    
Weight Bearing Status: Weight bearing as tolerated    
Call your doctor if you observe: Fever of 101 or Higher, Inability to urinate,   
Using more than 1 pad per hour, Shortness of breath, Chest pain, Calf discomfort  
and Uncontrolled pain    
When: 2 weeks virtual visit/ 6 weeks in office    
    
Meaningful Use Info    
Meaningful Use    
Meaningful Use Diagnoses (Choose all that apply): None applicable    
Ischemic Stroke    
Statin Dosing Therapy Reference:     
STATIN DOSE THERAPY REFERENCE:    
    
* Patients > 75 years receive moderate or high dose statin therapy.     
    
* Patients 75 years or YOUNGER should receive HIGH intensity statin dose unless   
contraindicated. You will be required to document reason for non-treatment if   
statin daily dose does not meet guidelines.    
    
     
    
    
    
HIGH DOSE STATIN THERAPY DAILY    
    
    
    
    
Atorvastatin > than or = to 40 mg    
     
Rosuvastatin > than or = to 20 mg    
     
Amlodipine + Atorvastatin > than or = to 2.5/40 mg    
     
Ezetimibe + Simvastatin                 10/80 mg      
     
Simvastatin                             80mg    
    
    
    
    
Discharge Plan    
Admission    
Admit Date/Time: 24 19:27    
    
Primary Reason for Your Visit: Labor and delivery    
    
Attending Provider: Emelia Wilks    
    
Primary Care Provider: Care Physician,No Primary    
    
Discharge Orders/Prescriptions    
Prescriptions:    
Continued    
  aripiprazole [Abilify] 2 mg tablet     
   2 mg PO DAILY     
  sertraline 150 mg capsule     
   150 mg PO DAILY     
  omeprazole 20 mg capsule,delayed release(DR/EC)     
   20 mg PO BID     
    
Discontinued    
  aspirin [Adult Low Dose Aspirin] 81 mg tablet,delayed release (DR/EC)     
   81 mg PO DAILY     
    
Referrals / Follow Up:    
Janet Lilly CNM [Med Staff - Adv Practice Prof] -     
Care Physician,No Primary [Primary Care Provider] -     
    
Disposition    
Disposition (needs filled in before D/C Order can be placed): Home, Self Care

## 2024-08-01 NOTE — PCM.DC.SUM
Providers
Date of Admission: 24
Primary Care Physician: 
[facility]

Reason For Visit: VAGINAL BIRTH

Diagnosis
Discharge Diagnosis
(1)  (spontaneous vaginal delivery): 
      Status: Acute
      Code(s):
O80 - Encounter for full-term uncomplicated delivery
(2) Maternal obesity syndrome in third trimester: 
      Status: Acute
      Code(s):
O99.213 - Obesity complicating pregnancy, third trimester
(3) Gestational diabetes mellitus, class A2: 
      Status: Acute
      Code(s):
O24.419 - Gestational diabetes mellitus in pregnancy, unspecified control
(4) High-risk pregnancy in third trimester: 
      Status: Acute
      Code(s):
O09.93 - Supervision of high risk pregnancy, unspecified, third trimester
(5) 37 weeks gestation of pregnancy: 
      Status: Acute
      Code(s):
Z3A.37 - 37 weeks gestation of pregnancy

Plan
PPD 1 
Routine care
Infant in [facility] 
Breast feeding without difficulty- putting baby to breast in [facility]


Medications at Discharge
Home Medications

aripiprazole 2 mg tablet (Abilify) 2 mg PO DAILY depression 24 
omeprazole 20 mg capsule,delayed release 20 mg PO BID acid reflux 24 
sertraline 150 mg capsule 150 mg PO DAILY depression 24 



Hospital Course
Operations
None
Procedures
None
Summary of Care Provided
Minutes Spent on Discharge: 15
Hospital Course: 
Patient had . 

Physical Exam
Narrative
Patient seen at bedside. Denies headache, vision changes, SOB, CP. Breastfeeding. Infant remains at [facility]. Patient desires discharge home and remain Hotel status.
Const
alert and no apparent distress
General Appearance: cooperative and comfortable
Exam Limitations: no limitations
HEENT
normocephalic
Eyes
General Eye: normal appearance of both eyes
Neck
full ROM
General: normal visual inspection
Chest
Chest: symmetrical chest wall rise
Resp
normal respiratory effort and normal air movement
Effort and Inspection: symmetric chest movement
Auscultation: clear to auscultation bilaterally
Cardio
regular rate and regular rhythm
GI
normal to inspection, nondistended, normoactive bowel sounds
Back/Spine
normal ROM
Extremity
full ROM and no calf tenderness
General Extremity: normal exam except as noted
Skin
no rashes or lesions noted
Neuro
CN's II-XII intact bilaterally
Psych
mental status grossly normal

Weight / BMI
Weight

Weight:                        250 lb 14.177 oz                                  
Body Mass Index (BMI)          40.4                                              



ABG / Lab / Microbiology Data

24 20:12          

Laboratory: 
Laboratory Results - last 24 hr

24 17:29: POC Glucose 67 L
24 17:42: POC Glucose 70 L



D/C Instructions
Discharge Diet: No restrictions
May resume sexual activity in: 6-8 weeks
Weight Bearing Status: Weight bearing as tolerated
Call your doctor if you observe: Fever of 101 or Higher, Inability to urinate, Using more than 1 pad per hour, Shortness of breath, Chest pain, Calf discomfort and Uncontrolled pain
When: 2 weeks virtual visit/ 6 weeks in office

Meaningful Use Info
Meaningful Use
Meaningful Use Diagnoses (Choose all that apply): None applicable
Ischemic Stroke
Statin Dosing Therapy Reference: 
STATIN DOSE THERAPY REFERENCE:

* Patients > 75 years receive moderate or high dose statin therapy. 

* Patients 75 years or YOUNGER should receive HIGH intensity statin dose unless contraindicated. You will be required to document reason for non-treatment if statin daily dose does not meet guidelines.

 

HIGH DOSE STATIN THERAPY DAILY

Atorvastatin > than or = to 40 mg
Rosuvastatin > than or = to 20 mg
Amlodipine + Atorvastatin > than or = to 2.5/40 mg
Ezetimibe + Simvastatin  10/80 mg  
Simvastatin  80mg



Discharge Plan
Admission
Admit Date/Time: 24 19:27

Primary Reason for Your Visit: Labor and delivery

Attending Provider: Emelia Wilks

Primary Care Provider: Care Physician,No Primary

Discharge Orders/Prescriptions
Prescriptions:
Continued
  aripiprazole [Abilify] 2 mg tablet 
   2 mg PO DAILY 
  sertraline 150 mg capsule 
   150 mg PO DAILY 
  omeprazole 20 mg capsule,delayed release(DR/EC) 
   20 mg PO BID 

Discontinued
  aspirin [Adult Low Dose Aspirin] 81 mg tablet,delayed release (DR/EC) 
   81 mg PO DAILY 

Referrals / Follow Up:
Janet Lilly CNM [Med Staff - [facility] Practice Prof] - 
Care Physician,No Primary [Primary Care Provider] - 

Disposition
Disposition (needs filled in before D/C Order can be placed): Home, Self Care

## 2024-08-02 NOTE — CASEMGMT
Addendum entered by Nancy Edgar  24 11:49: 

Social Work

SW called [facility] ,the case was screened as MOB has protective capacity. SW let MOB know.  No further needs anticipated at this time.

HANG Camacho

Original Note:

     Social Work Assessment  


Medical Record #: [id number]
Patient's Name: Reanna Crocker
YOB: 2024
Gender: female
Address: [address] [place] [address]
Phone: [phone] (home) 

REFERRAL

Date of Referral: 2024
Time of Referral: 3:04am
Date of Intervention: 2024
Time of Intervention: 9:45am

Referral site: [facility]

Referred by: Vivienne Dubon

Reason for Referral: in SCN, first baby.  Also concern of FOB substance abuse, MOB scoring a 10 on PHQ-9

PSYCHOSOCIAL HISTORY:

Presenting situation: Baby in SCN w/hypoglycemia.  MOB with history of depression, on Abilify and Zoloft, scored a 10 on PHQ-9.  FOB with history of methamphetamine use

History obtained from: MOB

Household composition: MOB and baby Reanna.  FOB not currently living with them, he is with his father:  FOB:  Sawyer Kohli,  1989, [address] [place] [address]

Patient's parent/guardian status: MOB is guardian of the baby.  

Medical History: MOB:  maternal obesity, history of depression, gestational diabetes, anemia  Baby:  Born 24, 3260 grams at birth, Apgars 3 and 9 at one and 5 minutes.  Baby was stunned at birth, hypoglycemic and in SCN

Developmental Concerns: None at this time

Educational Status:  MOB just completed doctorate in nursing practice at [employer] 9 months ago.

Health Care Coverage: [facility]

Financial Status: MOB reports no financial concerns at this time.  MOB was working at [employer], plans to transfer to [employer], works in hospital seeing pts.

Childcare/Caregiver(s): MOB, MOB's parents, MOB's brother and sister-in-law(they live down the driveway.  MOB plans to return to work, MOB's mother will watch the baby, MOB works night shift

Transportation: MOB has transportation

Programs/Agencies Involved: None at this time.

Behavioral Health Issues: MOB:  History of depression, since middle school/high school.  In  JORGE LUIS did finally decide to get some help after her last deployment(was in ).  JORGE LUIS has been in and out of counseling since a child.  The first 
time she did counseling was after her parents .  She started counseling again last year, goes to [facility] and also gets med management there.  She plans to continue, and will ask for med changes if needed.  She states the pregnancy was 
 rough  at times, and her meds were increased during pregnancy.  RN completed PHQ-9, MOB scored a 10.  She denies any thoughts of wanting to harm herself, states the only time she had thoughts like this was when she was a teen.  At present MOB feels 
okay in regard to depression but also will reach out should she have an increase in symptoms.  FOB:  Sawyer Kohli, she states he has been diagnosed w/ADHD when in high school.  Substance abuse:  MOB, no history.  FOB: As per MOB, he has used meth 
intermittently, has been through rehab twice and just got out in  or July.  She states eFSUSY has been in and out of shelter, and the last times FOBASIL was in shelter, MOB called his  an FOB went back to shelter.  She has known FOB since high 
school and they have had an on again/off again relationship since then.  He does want to be involved with the baby and MOB is setting boundaries.  She states he is  narcissistic and will laurel his way in.   She states she spoke to his PO before 
the birth of the baby and was advised to call the  if she has any concerns about him.
Safety:  JORGE LUIS denies concerns of safety but does states she is never certain what he would do, based on his history of substance abuse. She states he has never been violent but they have argued.   She states she did have  security system installed.  

Family Stressors: The stressors are as outlined above, MOB expressing stress around involvement of FOB.  She does not want to restrict his involvement with the baby but also wants to keep the baby safe.  JORGE LUIS is concerned that FOB will not respect 
the boundaries she has set.  She is prepared to call FOB's PO, get the law involved if it were to be needed.  She has told FOB he is not to come to the house and is not able to stay w/them, she is concerned FOB will push this boundary.  

Support Systems: MOB reports a lot of support in her mother, father, brother and sister in law, multiple [employer] friends.

Assessment
MOB appropriate, answered all SW questions fully and completely.  MOB able to express her concerns regarding the situation w/FOB in an appropriate manner, very self aware of her own depression.  SW explained will be calling Children's Services, MOB 
states understanding and does not seem concerned, understands this is to be a support.
Plan
 SW gave MOB resources including information on shaken baby, safe sleeping, Help Me Grow, and multiple resources on Postpartum Depression and anxiety.  MOB states understanding of PPD and aware of symptoms.  SW encouraged her to go back to her 
providers at [place] [id number] if she is having increased depressive symptoms.  MOB plans to do this, and has in fact done this in the past.  MOB aware of concerns of FOB, and is ready to limit his contact with the baby as needed, get law enforcement 
involved if needed.  Plan will be for baby to go home at discharge w/MOB.  SW will call Children's Services but this will not hold up discharge.


 Response to Plan:  MOB understands plan and is in agreement.  No further social service needs are anticipated at this time.  

HANG Martinez
2024

## 2024-08-06 NOTE — NURSING
Here for lactation visit on 8-4-24, follow up phone call questions asked. Feeling well, no pain, bleeding is minimal, no headaches, visual disturbances, or flu like symptoms. Continuing to nurse infant every 3 hours, pumping, and supplementing 
formula. Volume of milk is beginning to increase. Karina states she had a great experience in [facility].

## 2024-09-18 DIAGNOSIS — K21.9 GASTROESOPHAGEAL REFLUX DISEASE WITHOUT ESOPHAGITIS: Primary | ICD-10-CM

## 2024-09-18 RX ORDER — OMEPRAZOLE 20 MG/1
20 CAPSULE, DELAYED RELEASE ORAL DAILY
Qty: 30 CAPSULE | Refills: 11 | Status: SHIPPED | OUTPATIENT
Start: 2024-09-18 | End: 2025-09-18

## 2025-02-03 ENCOUNTER — APPOINTMENT (OUTPATIENT)
Dept: PRIMARY CARE | Facility: CLINIC | Age: 35
End: 2025-02-03
Payer: COMMERCIAL

## 2025-02-03 VITALS
WEIGHT: 228.8 LBS | DIASTOLIC BLOOD PRESSURE: 80 MMHG | SYSTOLIC BLOOD PRESSURE: 126 MMHG | OXYGEN SATURATION: 97 % | HEART RATE: 80 BPM | BODY MASS INDEX: 36.77 KG/M2 | HEIGHT: 66 IN

## 2025-02-03 DIAGNOSIS — Z00.00 WELLNESS EXAMINATION: ICD-10-CM

## 2025-02-03 DIAGNOSIS — Z86.32 HISTORY OF GESTATIONAL DIABETES: ICD-10-CM

## 2025-02-03 DIAGNOSIS — F32.A DEPRESSIVE DISORDER: ICD-10-CM

## 2025-02-03 DIAGNOSIS — K21.9 GASTROESOPHAGEAL REFLUX DISEASE, UNSPECIFIED WHETHER ESOPHAGITIS PRESENT: ICD-10-CM

## 2025-02-03 DIAGNOSIS — K21.9 GASTROESOPHAGEAL REFLUX DISEASE WITHOUT ESOPHAGITIS: Primary | ICD-10-CM

## 2025-02-03 PROBLEM — L23.7 PLANT ALLERGIC CONTACT DERMATITIS: Status: RESOLVED | Noted: 2024-02-01 | Resolved: 2025-02-03

## 2025-02-03 PROCEDURE — 3008F BODY MASS INDEX DOCD: CPT | Performed by: NURSE PRACTITIONER

## 2025-02-03 PROCEDURE — 1036F TOBACCO NON-USER: CPT | Performed by: NURSE PRACTITIONER

## 2025-02-03 PROCEDURE — 99395 PREV VISIT EST AGE 18-39: CPT | Performed by: NURSE PRACTITIONER

## 2025-02-03 RX ORDER — BUSPIRONE HYDROCHLORIDE 10 MG/1
10 TABLET ORAL 2 TIMES DAILY
COMMUNITY

## 2025-02-03 RX ORDER — OMEPRAZOLE 20 MG/1
20 CAPSULE, DELAYED RELEASE ORAL DAILY
Qty: 30 CAPSULE | Refills: 11 | Status: SHIPPED | OUTPATIENT
Start: 2025-02-03 | End: 2026-02-03

## 2025-02-03 RX ORDER — ARIPIPRAZOLE 2 MG/1
2 TABLET ORAL
COMMUNITY
Start: 2024-04-06

## 2025-02-03 ASSESSMENT — ENCOUNTER SYMPTOMS
COUGH: 0
ABDOMINAL PAIN: 0
CONSTIPATION: 0
PALPITATIONS: 0
SHORTNESS OF BREATH: 0
WHEEZING: 0
CHEST TIGHTNESS: 0
BLOOD IN STOOL: 0
DIARRHEA: 0

## 2025-02-03 ASSESSMENT — PATIENT HEALTH QUESTIONNAIRE - PHQ9
1. LITTLE INTEREST OR PLEASURE IN DOING THINGS: NOT AT ALL
SUM OF ALL RESPONSES TO PHQ9 QUESTIONS 1 AND 2: 0
2. FEELING DOWN, DEPRESSED OR HOPELESS: NOT AT ALL

## 2025-02-03 NOTE — ASSESSMENT & PLAN NOTE
"Depression - managed by [facility], zoloft recently increased  Abilify & Buspar added  Moods overall \"pretty good\", issues with child's father       "

## 2025-02-03 NOTE — PROGRESS NOTES
"Subjective   Patient ID: Shaila Hurtado is a 35 y.o. female who presents for Annual Exam (Wellness ).    Shaila comes to office for a wellness OV  Had daughter 6 MO ago. Did have gestational DM during pregnancy requiring insulin. Borderline hypertension during pregnancy, no antihypertensive treatment  Cervical CA Screening: PAP 2Y ago. UTD thru her OB GYN in [place].  Not currently breastfeeding  Had labs in June thru thru Ob/GYN  Closed displaced Fx L 4th finger, had surgery in August           Review of Systems   Respiratory:  Negative for cough, chest tightness, shortness of breath and wheezing.    Cardiovascular:  Negative for chest pain, palpitations and leg swelling.   Gastrointestinal:  Negative for abdominal pain, blood in stool, constipation and diarrhea.       Objective   /80   Pulse 80   Ht 1.676 m (5' 6\")   Wt 104 kg (228 lb 12.8 oz)   SpO2 97%   Breastfeeding No   BMI 36.93 kg/m²     Physical Exam  Vitals and nursing note reviewed.   Constitutional:       Appearance: Normal appearance. She is obese.   HENT:      Head: Normocephalic.   Cardiovascular:      Rate and Rhythm: Normal rate and regular rhythm.      Heart sounds: Normal heart sounds.   Pulmonary:      Effort: Pulmonary effort is normal.      Breath sounds: Normal breath sounds.   Abdominal:      General: Abdomen is flat. Bowel sounds are decreased.      Palpations: Abdomen is soft.      Tenderness: There is no abdominal tenderness.   Musculoskeletal:      Cervical back: Normal range of motion.      Right lower leg: No edema.      Left lower leg: No edema.   Skin:     General: Skin is warm and dry.   Neurological:      General: No focal deficit present.      Mental Status: She is alert and oriented to person, place, and time.   Psychiatric:         Mood and Affect: Mood normal.         Thought Content: Thought content normal.       Assessment/Plan   Assessment & Plan  Gastroesophageal reflux disease without esophagitis  Continues with " "PPI daily  No painful/difficulty with swallowing  Orders:    omeprazole (PriLOSEC) 20 mg DR capsule; Take 1 capsule (20 mg) by mouth once daily. Do not crush or chew.    Depressive disorder  Depression - managed by [facility] recently increased  Abilify & Buspar added  Moods overall \"pretty good\", issues with child's father       History of gestational diabetes    Orders:    Comprehensive Metabolic Panel; Future    Hemoglobin A1C; Future    Wellness examination    Orders:    CBC and Auto Differential; Future    TSH with reflex to Free T4 if abnormal; Future    Follow Up In Primary Care - Established; Future           "

## 2025-02-03 NOTE — ASSESSMENT & PLAN NOTE
Orders:    CBC and Auto Differential; Future    TSH with reflex to Free T4 if abnormal; Future    Follow Up In Primary Care - Established; Future

## 2025-02-03 NOTE — ASSESSMENT & PLAN NOTE
Continues with PPI daily  No painful/difficulty with swallowing  Orders:    omeprazole (PriLOSEC) 20 mg DR capsule; Take 1 capsule (20 mg) by mouth once daily. Do not crush or chew.

## 2025-02-08 LAB
ALBUMIN SERPL-MCNC: 4.6 G/DL (ref 3.6–5.1)
ALP SERPL-CCNC: 77 U/L (ref 31–125)
ALT SERPL-CCNC: 14 U/L (ref 6–29)
ANION GAP SERPL CALCULATED.4IONS-SCNC: 7 MMOL/L (CALC) (ref 7–17)
AST SERPL-CCNC: 13 U/L (ref 10–30)
BASOPHILS # BLD AUTO: 53 CELLS/UL (ref 0–200)
BASOPHILS NFR BLD AUTO: 0.5 %
BILIRUB SERPL-MCNC: 0.5 MG/DL (ref 0.2–1.2)
BUN SERPL-MCNC: 19 MG/DL (ref 7–25)
CALCIUM SERPL-MCNC: 9.6 MG/DL (ref 8.6–10.2)
CHLORIDE SERPL-SCNC: 102 MMOL/L (ref 98–110)
CO2 SERPL-SCNC: 29 MMOL/L (ref 20–32)
CREAT SERPL-MCNC: 0.74 MG/DL (ref 0.5–0.97)
EGFRCR SERPLBLD CKD-EPI 2021: 108 ML/MIN/1.73M2
EOSINOPHIL # BLD AUTO: 254 CELLS/UL (ref 15–500)
EOSINOPHIL NFR BLD AUTO: 2.4 %
ERYTHROCYTE [DISTWIDTH] IN BLOOD BY AUTOMATED COUNT: 13.7 % (ref 11–15)
EST. AVERAGE GLUCOSE BLD GHB EST-MCNC: 100 MG/DL
EST. AVERAGE GLUCOSE BLD GHB EST-SCNC: 5.5 MMOL/L
GLUCOSE SERPL-MCNC: 102 MG/DL (ref 65–139)
HBA1C MFR BLD: 5.1 % OF TOTAL HGB
HCT VFR BLD AUTO: 38.5 % (ref 35–45)
HGB BLD-MCNC: 12.3 G/DL (ref 11.7–15.5)
LYMPHOCYTES # BLD AUTO: 2724 CELLS/UL (ref 850–3900)
LYMPHOCYTES NFR BLD AUTO: 25.7 %
MCH RBC QN AUTO: 27.6 PG (ref 27–33)
MCHC RBC AUTO-ENTMCNC: 31.9 G/DL (ref 32–36)
MCV RBC AUTO: 86.5 FL (ref 80–100)
MONOCYTES # BLD AUTO: 668 CELLS/UL (ref 200–950)
MONOCYTES NFR BLD AUTO: 6.3 %
NEUTROPHILS # BLD AUTO: 6901 CELLS/UL (ref 1500–7800)
NEUTROPHILS NFR BLD AUTO: 65.1 %
PLATELET # BLD AUTO: 300 THOUSAND/UL (ref 140–400)
PMV BLD REES-ECKER: 9.9 FL (ref 7.5–12.5)
POTASSIUM SERPL-SCNC: 3.9 MMOL/L (ref 3.5–5.3)
PROT SERPL-MCNC: 6.7 G/DL (ref 6.1–8.1)
RBC # BLD AUTO: 4.45 MILLION/UL (ref 3.8–5.1)
SODIUM SERPL-SCNC: 138 MMOL/L (ref 135–146)
TSH SERPL-ACNC: 1.54 MIU/L
WBC # BLD AUTO: 10.6 THOUSAND/UL (ref 3.8–10.8)

## 2025-02-10 DIAGNOSIS — Z00.00 WELLNESS EXAMINATION: ICD-10-CM

## 2026-02-04 ENCOUNTER — APPOINTMENT (OUTPATIENT)
Age: 36
End: 2026-02-04
Payer: COMMERCIAL